# Patient Record
Sex: FEMALE | Race: WHITE | NOT HISPANIC OR LATINO
[De-identification: names, ages, dates, MRNs, and addresses within clinical notes are randomized per-mention and may not be internally consistent; named-entity substitution may affect disease eponyms.]

---

## 2017-01-04 ENCOUNTER — APPOINTMENT (OUTPATIENT)
Dept: HEMATOLOGY ONCOLOGY | Facility: CLINIC | Age: 82
End: 2017-01-04
Payer: MEDICARE

## 2017-01-04 VITALS
HEART RATE: 68 BPM | SYSTOLIC BLOOD PRESSURE: 129 MMHG | RESPIRATION RATE: 16 BRPM | OXYGEN SATURATION: 97 % | TEMPERATURE: 97.3 F | BODY MASS INDEX: 31.55 KG/M2 | WEIGHT: 189.6 LBS | DIASTOLIC BLOOD PRESSURE: 82 MMHG

## 2017-01-04 PROCEDURE — 99214 OFFICE O/P EST MOD 30 MIN: CPT

## 2017-12-22 ENCOUNTER — OUTPATIENT (OUTPATIENT)
Dept: OUTPATIENT SERVICES | Facility: HOSPITAL | Age: 82
LOS: 1 days | Discharge: HOME | End: 2017-12-22

## 2017-12-22 DIAGNOSIS — C50.919 MALIGNANT NEOPLASM OF UNSPECIFIED SITE OF UNSPECIFIED FEMALE BREAST: ICD-10-CM

## 2017-12-29 ENCOUNTER — OTHER (OUTPATIENT)
Age: 82
End: 2017-12-29

## 2018-01-02 ENCOUNTER — APPOINTMENT (OUTPATIENT)
Dept: HEMATOLOGY ONCOLOGY | Facility: CLINIC | Age: 83
End: 2018-01-02
Payer: MEDICARE

## 2018-01-02 VITALS
HEART RATE: 83 BPM | DIASTOLIC BLOOD PRESSURE: 72 MMHG | WEIGHT: 174.58 LBS | BODY MASS INDEX: 29.06 KG/M2 | SYSTOLIC BLOOD PRESSURE: 109 MMHG | TEMPERATURE: 97.4 F | RESPIRATION RATE: 16 BRPM | OXYGEN SATURATION: 96 %

## 2018-01-02 PROCEDURE — 99214 OFFICE O/P EST MOD 30 MIN: CPT

## 2019-01-12 ENCOUNTER — OUTPATIENT (OUTPATIENT)
Dept: OUTPATIENT SERVICES | Facility: HOSPITAL | Age: 84
LOS: 1 days | Discharge: HOME | End: 2019-01-12
Payer: MEDICARE

## 2019-01-12 DIAGNOSIS — C50.919 MALIGNANT NEOPLASM OF UNSPECIFIED SITE OF UNSPECIFIED FEMALE BREAST: ICD-10-CM

## 2019-01-15 ENCOUNTER — APPOINTMENT (OUTPATIENT)
Dept: HEMATOLOGY ONCOLOGY | Facility: CLINIC | Age: 84
End: 2019-01-15
Payer: MEDICARE

## 2019-01-15 VITALS
DIASTOLIC BLOOD PRESSURE: 70 MMHG | TEMPERATURE: 98.2 F | SYSTOLIC BLOOD PRESSURE: 130 MMHG | HEART RATE: 88 BPM | BODY MASS INDEX: 31.18 KG/M2 | RESPIRATION RATE: 16 BRPM | WEIGHT: 187.39 LBS | OXYGEN SATURATION: 98 %

## 2019-01-15 PROCEDURE — 99214 OFFICE O/P EST MOD 30 MIN: CPT

## 2019-01-15 PROCEDURE — 93010 ELECTROCARDIOGRAM REPORT: CPT

## 2019-01-16 NOTE — PHYSICAL EXAM
[de-identified] : rapid heart rate with afib  [Restricted in physically strenuous activity but ambulatory and able to carry out work of a light or sedentary nature] : Status 1- Restricted in physically strenuous activity but ambulatory and able to carry out work of a light or sedentary nature, e.g., light house work, office work [Normal] : affect appropriate [de-identified] : right breast indusration at the site of the breast surgery as before

## 2019-01-16 NOTE — ASSESSMENT
[FreeTextEntry1] : The pt will continue to be followed for evidence of recurrence of her disease. She will continue mammograms and sonograms, bone density and Medical followup. Pt to RTO in 1 year or sooner as necessary.  [Curative] : Goals of care discussed with patient: Curative [Palliative Care Plan] : not applicable at this time

## 2019-01-16 NOTE — REVIEW OF SYSTEMS
[FreeTextEntry7] : has GERD on no meds [Shortness Of Breath] : shortness of breath [Joint Pain] : joint pain [Joint Stiffness] : joint stiffness [Negative] : Allergic/Immunologic

## 2019-01-16 NOTE — HISTORY OF PRESENT ILLNESS
[Disease: _____________________] : Disease: [unfilled] [T: ___] : T[unfilled] [N: ___] : N[unfilled] [M: ___] : M[unfilled] [AJCC Stage: ____] : AJCC Stage: [unfilled] [de-identified] : This is a 79-year-old woman with breast cancer. Her history dates back to May 2008 when a mammogram revealed a right breast lesion measuring 1.1 cm. 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 In October 2008 the patient underwent lumpectomy. The tumor was 1.5 cm infiltrating duct carcinoma grade 1 with 3 negative sentinel lymph nodes. The Oncotype score was 25. The patient underwent MammoSite radiation treatment and has completed 5 years of anastrozole.\par \par  [de-identified] : IDC [de-identified] : Since the last visit the pt remains well and has no symptoms indicating recurrent disease.

## 2019-01-16 NOTE — PHYSICAL EXAM
[de-identified] : rapid heart rate with afib  [Restricted in physically strenuous activity but ambulatory and able to carry out work of a light or sedentary nature] : Status 1- Restricted in physically strenuous activity but ambulatory and able to carry out work of a light or sedentary nature, e.g., light house work, office work [Normal] : affect appropriate [de-identified] : right breast indusration at the site of the breast surgery as before

## 2019-01-16 NOTE — HISTORY OF PRESENT ILLNESS
[Disease: _____________________] : Disease: [unfilled] [T: ___] : T[unfilled] [N: ___] : N[unfilled] [M: ___] : M[unfilled] [AJCC Stage: ____] : AJCC Stage: [unfilled] [de-identified] : This is a 79-year-old woman with breast cancer. Her history dates back to May 2008 when a mammogram revealed a right breast lesion measuring 1.1 cm. 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 In October 2008 the patient underwent lumpectomy. The tumor was 1.5 cm infiltrating duct carcinoma grade 1 with 3 negative sentinel lymph nodes. The Oncotype score was 25. The patient underwent MammoSite radiation treatment and has completed 5 years of anastrozole.\par \par  [de-identified] : IDC [de-identified] : Since the last visit the pt remains well and has no symptoms indicating recurrent disease.

## 2019-01-23 DIAGNOSIS — R00.2 PALPITATIONS: ICD-10-CM

## 2019-02-23 ENCOUNTER — INPATIENT (INPATIENT)
Facility: HOSPITAL | Age: 84
LOS: 15 days | Discharge: NURSING, REHAB OR LONG-TERM CARE | End: 2019-03-11
Attending: INTERNAL MEDICINE | Admitting: INTERNAL MEDICINE
Payer: MEDICARE

## 2019-02-23 ENCOUNTER — TRANSCRIPTION ENCOUNTER (OUTPATIENT)
Age: 84
End: 2019-02-23

## 2019-02-23 VITALS
SYSTOLIC BLOOD PRESSURE: 100 MMHG | OXYGEN SATURATION: 100 % | RESPIRATION RATE: 16 BRPM | DIASTOLIC BLOOD PRESSURE: 52 MMHG | TEMPERATURE: 98 F | HEART RATE: 86 BPM | WEIGHT: 190.04 LBS | HEIGHT: 60 IN

## 2019-02-23 LAB
ALBUMIN SERPL ELPH-MCNC: 3.1 G/DL — LOW (ref 3.3–5)
ALP SERPL-CCNC: 92 U/L — SIGNIFICANT CHANGE UP (ref 40–120)
ALT FLD-CCNC: 13 U/L — SIGNIFICANT CHANGE UP (ref 4–33)
ANION GAP SERPL CALC-SCNC: 12 MMO/L — SIGNIFICANT CHANGE UP (ref 7–14)
APTT BLD: 36.7 SEC — HIGH (ref 27.5–36.3)
AST SERPL-CCNC: 20 U/L — SIGNIFICANT CHANGE UP (ref 4–32)
BILIRUB SERPL-MCNC: 0.6 MG/DL — SIGNIFICANT CHANGE UP (ref 0.2–1.2)
BLD GP AB SCN SERPL QL: NEGATIVE — SIGNIFICANT CHANGE UP
BUN SERPL-MCNC: 25 MG/DL — HIGH (ref 7–23)
CALCIUM SERPL-MCNC: 8.6 MG/DL — SIGNIFICANT CHANGE UP (ref 8.4–10.5)
CHLORIDE SERPL-SCNC: 95 MMOL/L — LOW (ref 98–107)
CO2 SERPL-SCNC: 25 MMOL/L — SIGNIFICANT CHANGE UP (ref 22–31)
CREAT SERPL-MCNC: 0.88 MG/DL — SIGNIFICANT CHANGE UP (ref 0.5–1.3)
FOLATE SERPL-MCNC: > 20 NG/ML — HIGH (ref 4.7–20)
GLUCOSE SERPL-MCNC: 119 MG/DL — HIGH (ref 70–99)
HCT VFR BLD CALC: 24 % — LOW (ref 34.5–45)
HGB BLD-MCNC: 7.3 G/DL — LOW (ref 11.5–15.5)
INR BLD: 1.5 — HIGH (ref 0.88–1.17)
MCHC RBC-ENTMCNC: 30.4 % — LOW (ref 32–36)
MCHC RBC-ENTMCNC: 31.1 PG — SIGNIFICANT CHANGE UP (ref 27–34)
MCV RBC AUTO: 102.1 FL — HIGH (ref 80–100)
NRBC # FLD: 0 K/UL — LOW (ref 25–125)
PLATELET # BLD AUTO: 414 K/UL — HIGH (ref 150–400)
PMV BLD: 9.3 FL — SIGNIFICANT CHANGE UP (ref 7–13)
POTASSIUM SERPL-MCNC: 3.4 MMOL/L — LOW (ref 3.5–5.3)
POTASSIUM SERPL-SCNC: 3.4 MMOL/L — LOW (ref 3.5–5.3)
PROT SERPL-MCNC: 6.5 G/DL — SIGNIFICANT CHANGE UP (ref 6–8.3)
PROTHROM AB SERPL-ACNC: 16.9 SEC — HIGH (ref 9.8–13.1)
RBC # BLD: 2.35 M/UL — LOW (ref 3.8–5.2)
RBC # FLD: 18 % — HIGH (ref 10.3–14.5)
RH IG SCN BLD-IMP: POSITIVE — SIGNIFICANT CHANGE UP
SODIUM SERPL-SCNC: 132 MMOL/L — LOW (ref 135–145)
VIT B12 SERPL-MCNC: 1896 PG/ML — HIGH (ref 200–900)
WBC # BLD: 5.89 K/UL — SIGNIFICANT CHANGE UP (ref 3.8–10.5)
WBC # FLD AUTO: 5.89 K/UL — SIGNIFICANT CHANGE UP (ref 3.8–10.5)

## 2019-02-23 NOTE — ED ADULT NURSE NOTE - CHIEF COMPLAINT QUOTE
Pt arrives to ED via EMS for reported low H&H.  Pt is at a rehab facility [facility].  Pt had a fall on February 9th and injured her back and left shin.  Pt reports having an open wound on her left shin which is wrapped as well as a wound below her left shoulder.  Pt has tubing for a wound vac in place on leg but the vac remained at the rehab facility.  Pt on Eliquis.  Cardiologist is Paul Sanderson.  Pt has a left arm PICC line in place.

## 2019-02-23 NOTE — ED ADULT TRIAGE NOTE - CHIEF COMPLAINT QUOTE
Pt arrives to ED via EMS for reported low H&H.  Pt is at a rehab facility [facility].  Pt had a fall on February 9th and injured her back and left shin.  Pt reports having an open wound on her left shin which is wrapped as well as a wound below her left shoulder.  Pt has tubing for a wound vac in place on leg but the vac remained at the rehab facility.  Pt on Eliquis.  Cardiologist is Paul Sanderson. Pt arrives to ED via EMS for reported low H&H.  Pt is at a rehab facility [facility].  Pt had a fall on February 9th and injured her back and left shin.  Pt reports having an open wound on her left shin which is wrapped as well as a wound below her left shoulder.  Pt has tubing for a wound vac in place on leg but the vac remained at the rehab facility.  Pt on Eliquis.  Cardiologist is Paul Sanderson.  Pt has a left arm PICC line in place.

## 2019-02-23 NOTE — ED PROVIDER NOTE - PROGRESS NOTE DETAILS
Sahil:  CT negative for abscess.  Admitted to medicine under Dr. Timoteo Sanderson.  MAR text paged.

## 2019-02-23 NOTE — ED PROVIDER NOTE - OBJECTIVE STATEMENT
83 y.o. female PMH DM, Afib on Eliquis p/w hemoglobin of 7.1 drawn today at her rehab facility.  She fell from standing on a chair in her kitchen two weeks ago and was impaled on a kitchen appliance for 48 hours before her daughter found her.  She had lost some blood and had a hemoglobin of 6 (baseline around 9) at that admission, and also had a wound on her left lower lateral leg that had a wound vac previously, which is now removed.  She was discharged to a rehab facility.  Yesterday she developed some low grade fevers to 99F, and she had a PICC line place and today she was started on Zosyn early this morning her doctor at the rehab.  Today she is feeling well, no chest pain, dizziness, SOB, CP, fevers, leg pain, weakness.  She denies bleeding from wound, hematochezia, hematuria, melena, or any other bleeding.  She is concerned that no infectious disease doctor has seen her leg.

## 2019-02-23 NOTE — ED PROVIDER NOTE - CLINICAL SUMMARY MEDICAL DECISION MAKING FREE TEXT BOX
Repeat CBC, transfuse to 7.0 hgb as patient is asymptomatic.  CT leg to eval for abscess.  Likely D/C to continue IV Abx at rehab.

## 2019-02-23 NOTE — ED ADULT NURSE NOTE - OBJECTIVE STATEMENT
received pt to room 23 aox3 in no apparent distress c/o wound on LLE. PT fell 2 weeks ago and impaled her leg on stool in kitchen, pt was not found by daughter until two days later. pT was brought to hospital had loss blood and had two open wounds that required wound vac upon discharge, wound to left calf and wound to left lower side of back. Pt states once in rehab facility wound on leg was not getting the same as it was in hospital, wound changes were done different and wound vac not being used. Pt states when she left the hospital wound on leg was linear with small opening, wound now is large measuring 5 inches  opened with some serosanguinous drainage. Pt yesterday began having increased pain and swelling to wound on leg and low grade fevers, lower hemoglobin had PICC rosaura placed and started on IV abx. Wound on back is intact with wound vac dressing attached and tubing pt states wound on back improving. Ecchymosis noted to arms and buttock, pt denies any other wounds. Lower extremity edema noted bilateral pt states chronic, LLE red with warmth around site of wound. Pt states able to ambulate small distance with some pain. Pt states swelling is better today than yesterday . No motor or deficits noted. Pt denies SOB, chest pain, chills, tingling, numbness, n/v, urinary changes weakness or any other symptoms. PICC line noted to left arm with positive blood return  Breaths equal and unlabored VSS labs sent as per order will continue to monitor

## 2019-02-23 NOTE — ED ADULT NURSE NOTE - NSIMPLEMENTINTERV_GEN_ALL_ED
Implemented All Fall with Harm Risk Interventions:  [place] to call system. Call bell, personal items and telephone within reach. Instruct patient to call for assistance. Room bathroom lighting operational. Non-slip footwear when patient is off stretcher. Physically safe environment: no spills, clutter or unnecessary equipment. Stretcher in lowest position, wheels locked, appropriate side rails in place. Provide visual cue, wrist band, yellow gown, etc. Monitor gait and stability. Monitor for mental status changes and reorient to person, place, and time. Review medications for side effects contributing to fall risk. Reinforce activity limits and safety measures with patient and family. Provide visual clues: red socks.

## 2019-02-23 NOTE — ED PROVIDER NOTE - PHYSICAL EXAMINATION
Gen: Obese, NAD, alert and oriented  HEENT:  Sclera clear, MMM  Heart:  RRR, Grade III crescendo-decrescendo systolic murmur at upper sternal border.  Lung:  CTA b/l, no rales or wheeze  Abd:  ND, soft, NT  Ext:  20cm x 10 cm well-healing wound on lateral left lower leg with mild erythema, no exudate, nonbleeding.  Deep portion in center with gauze packing - gauze is dry.  Skin:  No abrasions or ecchymosis except as above  Neuro:  Nonfocal Gen: Obese, NAD, alert and oriented  HEENT:  Sclera clear, MMM  Heart:  RRR, Grade III crescendo-decrescendo systolic murmur at upper sternal border.  Lung:  CTA b/l, no rales or wheeze  Abd:  ND, soft, NT  Ext:  20cm x 10 cm well-healing wound on lateral left lower leg with mild erythema, no exudate, nonbleeding.  Deep portion in center with gauze packing - gauze is dry.  Skin:  as documented above  Neuro:  Nonfocal

## 2019-02-23 NOTE — ED PROVIDER NOTE - SHIFT CHANGE DETAILS
I have signed over this patient to the above attending physician. Pertinent history, physical exam findings and workup thus far in the ED have been discussed. The pending tests and plan, including CT LE and admission were signed over.  All questions from the above attending physician have been answered.

## 2019-02-23 NOTE — ED PROVIDER NOTE - ATTENDING CONTRIBUTION TO CARE
Lefty: 82yo female with a h/o DM and a fib on eliquis BIBEMS from rehab for low h/h. Pt fell two weeks ago sustaining lacerations on left leg and flank. Pt required local wound care and vacs at both sites  while admitted. 4 days ago she was d/c to rehab facility and has since developed fevers and the wound has dehisced. No increased pain or swelling. Pt denies bleeding from the sites. No chest pain, SOB, or dizziness. NO bloody or black stools. No abdominal pain, nausea or vomiting. Exam: GENERAL: well appearing, NAD, HEENT: MMM, pink conjunctiva, CARDIO: +S1/S2, no murmurs, rubs or gallops, LUNGS: CTA B/L, no wheezing, rales or rhonchi, ABD: soft, nontender, BSx4 quadrants, no guarding or rigidity. EXT: 20 x 10cm wound on LLE, sutures intact but surrounding area appears necrotic with surrounding erythema. central packing in place. No drainage. left flank wound with wound vac attached, clean and dry and no signs of surrounding infection. NEURO: AxOx3, SKIN: as documented above on leg findings. A/P- 82 yo female with left lower extremity wound with concern for infection. will obtain labs, CT leg, and reassess. PT currently on abx from rehab. Lefty: 84yo female with a h/o DM and a fib on eliquis BIBEMS from rehab for low h/h. Pt fell two weeks ago sustaining lacerations on left leg and flank. Pt required local wound care and vacs at both sites  while admitted. 4 days ago she was d/c to rehab facility and has since developed fevers and the wound has dehisced. No increased pain or swelling. Pt denies bleeding from the sites. No chest pain, SOB, or dizziness. NO bloody or black stools. No abdominal pain, nausea or vomiting. Exam: GENERAL: well appearing, NAD, HEENT: MMM, pink conjunctiva, CARDIO: +systolic murmur heard best at sternal border LUNGS: CTA B/L, no wheezing, rales or rhonchi, ABD: soft, nontender, BSx4 quadrants, no guarding or rigidity. EXT: 20 x 10cm wound on LLE, sutures intact but surrounding area appears necrotic with surrounding erythema. central packing in place. No drainage. left flank wound with wound vac attached, clean and dry and no signs of surrounding infection. NEURO: AxOx3, SKIN: as documented above on leg findings. A/P- 84 yo female with left lower extremity wound with concern for infection. will obtain labs, CT leg, and reassess. PT currently on abx from rehab.

## 2019-02-24 DIAGNOSIS — S81.802A UNSPECIFIED OPEN WOUND, LEFT LOWER LEG, INITIAL ENCOUNTER: ICD-10-CM

## 2019-02-24 DIAGNOSIS — D64.9 ANEMIA, UNSPECIFIED: ICD-10-CM

## 2019-02-24 DIAGNOSIS — I48.91 UNSPECIFIED ATRIAL FIBRILLATION: ICD-10-CM

## 2019-02-24 DIAGNOSIS — Z29.9 ENCOUNTER FOR PROPHYLACTIC MEASURES, UNSPECIFIED: ICD-10-CM

## 2019-02-24 DIAGNOSIS — E11.9 TYPE 2 DIABETES MELLITUS WITHOUT COMPLICATIONS: ICD-10-CM

## 2019-02-24 LAB
BLD GP AB SCN SERPL QL: NEGATIVE — SIGNIFICANT CHANGE UP
GLUCOSE BLDC GLUCOMTR-MCNC: 125 MG/DL — HIGH (ref 70–99)
RH IG SCN BLD-IMP: POSITIVE — SIGNIFICANT CHANGE UP

## 2019-02-24 PROCEDURE — 99222 1ST HOSP IP/OBS MODERATE 55: CPT | Mod: GC,57

## 2019-02-24 PROCEDURE — 99223 1ST HOSP IP/OBS HIGH 75: CPT

## 2019-02-24 PROCEDURE — 73701 CT LOWER EXTREMITY W/DYE: CPT | Mod: 26,LT

## 2019-02-24 PROCEDURE — 97597 DBRDMT OPN WND 1ST 20 CM/<: CPT | Mod: GC

## 2019-02-24 RX ORDER — ENOXAPARIN SODIUM 100 MG/ML
40 INJECTION SUBCUTANEOUS DAILY
Qty: 0 | Refills: 0 | Status: DISCONTINUED | OUTPATIENT
Start: 2019-02-24 | End: 2019-03-01

## 2019-02-24 RX ORDER — SODIUM CHLORIDE 9 MG/ML
1000 INJECTION, SOLUTION INTRAVENOUS
Qty: 0 | Refills: 0 | Status: DISCONTINUED | OUTPATIENT
Start: 2019-02-24 | End: 2019-02-24

## 2019-02-24 RX ORDER — POTASSIUM CHLORIDE 20 MEQ
40 PACKET (EA) ORAL ONCE
Qty: 0 | Refills: 0 | Status: COMPLETED | OUTPATIENT
Start: 2019-02-24 | End: 2019-02-27

## 2019-02-24 RX ORDER — DEXTROSE 50 % IN WATER 50 %
12.5 SYRINGE (ML) INTRAVENOUS ONCE
Qty: 0 | Refills: 0 | Status: DISCONTINUED | OUTPATIENT
Start: 2019-02-24 | End: 2019-03-11

## 2019-02-24 RX ORDER — PIPERACILLIN AND TAZOBACTAM 4; .5 G/20ML; G/20ML
3.38 INJECTION, POWDER, LYOPHILIZED, FOR SOLUTION INTRAVENOUS ONCE
Qty: 0 | Refills: 0 | Status: COMPLETED | OUTPATIENT
Start: 2019-02-24 | End: 2019-02-24

## 2019-02-24 RX ORDER — HYDROMORPHONE HYDROCHLORIDE 2 MG/ML
0.5 INJECTION INTRAMUSCULAR; INTRAVENOUS; SUBCUTANEOUS
Qty: 0 | Refills: 0 | Status: DISCONTINUED | OUTPATIENT
Start: 2019-02-24 | End: 2019-02-24

## 2019-02-24 RX ORDER — OXYCODONE AND ACETAMINOPHEN 5; 325 MG/1; MG/1
1 TABLET ORAL ONCE
Qty: 0 | Refills: 0 | Status: DISCONTINUED | OUTPATIENT
Start: 2019-02-24 | End: 2019-02-24

## 2019-02-24 RX ORDER — SIMVASTATIN 20 MG/1
40 TABLET, FILM COATED ORAL AT BEDTIME
Qty: 0 | Refills: 0 | Status: DISCONTINUED | OUTPATIENT
Start: 2019-02-24 | End: 2019-03-11

## 2019-02-24 RX ORDER — FENTANYL CITRATE 50 UG/ML
25 INJECTION INTRAVENOUS
Qty: 0 | Refills: 0 | Status: DISCONTINUED | OUTPATIENT
Start: 2019-02-24 | End: 2019-02-24

## 2019-02-24 RX ORDER — INSULIN LISPRO 100/ML
VIAL (ML) SUBCUTANEOUS
Qty: 0 | Refills: 0 | Status: DISCONTINUED | OUTPATIENT
Start: 2019-02-24 | End: 2019-03-11

## 2019-02-24 RX ORDER — APIXABAN 2.5 MG/1
2.5 TABLET, FILM COATED ORAL EVERY 12 HOURS
Qty: 0 | Refills: 0 | Status: DISCONTINUED | OUTPATIENT
Start: 2019-02-24 | End: 2019-02-24

## 2019-02-24 RX ORDER — SODIUM CHLORIDE 9 MG/ML
500 INJECTION, SOLUTION INTRAVENOUS ONCE
Qty: 0 | Refills: 0 | Status: COMPLETED | OUTPATIENT
Start: 2019-02-24 | End: 2019-02-24

## 2019-02-24 RX ORDER — VANCOMYCIN HCL 1 G
VIAL (EA) INTRAVENOUS
Qty: 0 | Refills: 0 | Status: DISCONTINUED | OUTPATIENT
Start: 2019-02-24 | End: 2019-02-24

## 2019-02-24 RX ORDER — VANCOMYCIN HCL 1 G
750 VIAL (EA) INTRAVENOUS EVERY 12 HOURS
Qty: 0 | Refills: 0 | Status: DISCONTINUED | OUTPATIENT
Start: 2019-02-24 | End: 2019-03-08

## 2019-02-24 RX ORDER — GLUCAGON INJECTION, SOLUTION 0.5 MG/.1ML
1 INJECTION, SOLUTION SUBCUTANEOUS ONCE
Qty: 0 | Refills: 0 | Status: DISCONTINUED | OUTPATIENT
Start: 2019-02-24 | End: 2019-03-11

## 2019-02-24 RX ORDER — PIPERACILLIN AND TAZOBACTAM 4; .5 G/20ML; G/20ML
3.38 INJECTION, POWDER, LYOPHILIZED, FOR SOLUTION INTRAVENOUS EVERY 8 HOURS
Qty: 0 | Refills: 0 | Status: DISCONTINUED | OUTPATIENT
Start: 2019-02-24 | End: 2019-03-08

## 2019-02-24 RX ORDER — SODIUM CHLORIDE 9 MG/ML
1000 INJECTION, SOLUTION INTRAVENOUS
Qty: 0 | Refills: 0 | Status: DISCONTINUED | OUTPATIENT
Start: 2019-02-24 | End: 2019-02-27

## 2019-02-24 RX ORDER — TOPIRAMATE 25 MG
100 TABLET ORAL
Qty: 0 | Refills: 0 | Status: DISCONTINUED | OUTPATIENT
Start: 2019-02-24 | End: 2019-03-11

## 2019-02-24 RX ORDER — ACETAMINOPHEN 500 MG
1000 TABLET ORAL ONCE
Qty: 0 | Refills: 0 | Status: COMPLETED | OUTPATIENT
Start: 2019-02-24 | End: 2019-02-24

## 2019-02-24 RX ORDER — ACETAMINOPHEN 500 MG
650 TABLET ORAL EVERY 6 HOURS
Qty: 0 | Refills: 0 | Status: DISCONTINUED | OUTPATIENT
Start: 2019-02-24 | End: 2019-03-11

## 2019-02-24 RX ORDER — DEXTROSE 50 % IN WATER 50 %
15 SYRINGE (ML) INTRAVENOUS ONCE
Qty: 0 | Refills: 0 | Status: DISCONTINUED | OUTPATIENT
Start: 2019-02-24 | End: 2019-03-11

## 2019-02-24 RX ORDER — OXYCODONE HYDROCHLORIDE 5 MG/1
5 TABLET ORAL EVERY 6 HOURS
Qty: 0 | Refills: 0 | Status: DISCONTINUED | OUTPATIENT
Start: 2019-02-24 | End: 2019-03-03

## 2019-02-24 RX ORDER — DEXTROSE 50 % IN WATER 50 %
25 SYRINGE (ML) INTRAVENOUS ONCE
Qty: 0 | Refills: 0 | Status: DISCONTINUED | OUTPATIENT
Start: 2019-02-24 | End: 2019-03-11

## 2019-02-24 RX ORDER — ONDANSETRON 8 MG/1
4 TABLET, FILM COATED ORAL ONCE
Qty: 0 | Refills: 0 | Status: DISCONTINUED | OUTPATIENT
Start: 2019-02-24 | End: 2019-02-24

## 2019-02-24 RX ORDER — METFORMIN HYDROCHLORIDE 850 MG/1
1000 TABLET ORAL
Qty: 0 | Refills: 0 | Status: DISCONTINUED | OUTPATIENT
Start: 2019-02-24 | End: 2019-02-24

## 2019-02-24 RX ORDER — PYRIDOXINE HCL (VITAMIN B6) 100 MG
100 TABLET ORAL DAILY
Qty: 0 | Refills: 0 | Status: DISCONTINUED | OUTPATIENT
Start: 2019-02-24 | End: 2019-03-11

## 2019-02-24 RX ADMIN — Medication 1000 MILLIGRAM(S): at 08:59

## 2019-02-24 RX ADMIN — SODIUM CHLORIDE 100 MILLILITER(S): 9 INJECTION, SOLUTION INTRAVENOUS at 18:15

## 2019-02-24 RX ADMIN — PIPERACILLIN AND TAZOBACTAM 25 GRAM(S): 4; .5 INJECTION, POWDER, LYOPHILIZED, FOR SOLUTION INTRAVENOUS at 23:52

## 2019-02-24 RX ADMIN — OXYCODONE AND ACETAMINOPHEN 1 TABLET(S): 5; 325 TABLET ORAL at 00:00

## 2019-02-24 RX ADMIN — PIPERACILLIN AND TAZOBACTAM 25 GRAM(S): 4; .5 INJECTION, POWDER, LYOPHILIZED, FOR SOLUTION INTRAVENOUS at 02:16

## 2019-02-24 RX ADMIN — HYDROMORPHONE HYDROCHLORIDE 0.5 MILLIGRAM(S): 2 INJECTION INTRAMUSCULAR; INTRAVENOUS; SUBCUTANEOUS at 19:00

## 2019-02-24 RX ADMIN — HYDROMORPHONE HYDROCHLORIDE 0.5 MILLIGRAM(S): 2 INJECTION INTRAMUSCULAR; INTRAVENOUS; SUBCUTANEOUS at 18:30

## 2019-02-24 RX ADMIN — SODIUM CHLORIDE 100 MILLILITER(S): 9 INJECTION, SOLUTION INTRAVENOUS at 23:52

## 2019-02-24 RX ADMIN — Medication 400 MILLIGRAM(S): at 08:35

## 2019-02-24 RX ADMIN — HYDROMORPHONE HYDROCHLORIDE 0.5 MILLIGRAM(S): 2 INJECTION INTRAMUSCULAR; INTRAVENOUS; SUBCUTANEOUS at 18:40

## 2019-02-24 RX ADMIN — HYDROMORPHONE HYDROCHLORIDE 0.5 MILLIGRAM(S): 2 INJECTION INTRAMUSCULAR; INTRAVENOUS; SUBCUTANEOUS at 18:10

## 2019-02-24 RX ADMIN — OXYCODONE AND ACETAMINOPHEN 1 TABLET(S): 5; 325 TABLET ORAL at 02:17

## 2019-02-24 RX ADMIN — HYDROMORPHONE HYDROCHLORIDE 0.5 MILLIGRAM(S): 2 INJECTION INTRAMUSCULAR; INTRAVENOUS; SUBCUTANEOUS at 21:45

## 2019-02-24 RX ADMIN — SODIUM CHLORIDE 500 MILLILITER(S): 9 INJECTION, SOLUTION INTRAVENOUS at 20:56

## 2019-02-24 NOTE — CONSULT NOTE ADULT - SUBJECTIVE AND OBJECTIVE BOX
General Surgery Consult  Consulting surgical team: [facility]  Consulting attending: Dr. Perry    HPI:  83 y.o. female PMH DM, Afib on Eliquis, s/p fall from a stool in her kitchen two weeks ago on Feb 9 and was impaled on her back on a kitchen appliance for 48 hours before her daughter found her, hospitalized at [facility] for acute blood loss anemia due to hematoma and had a hemoglobin of 6 (baseline around 9) requiring 3 units of pRBC, seen by surgeon Dr. Dorian Lazaro and had wound VAc placed to her left back  and LLE (wound VAC LLE removed prior to discharge to rehab 4 days ago, who now presents with wound dehiscence LLE and low grade fever 99F at rehab and was started on zosyn via LUE PICC placed a day ago. Patient denies chest pain/dizziness, sob, cough, abd pain, diarrhea.  In ED, CT LLE severe diffuse subcutaneous edema in the extremities, however, no collection or abscess and no advance OM, no necrotizing infection.       PAST MEDICAL HISTORY:  Diabetes  Atrial fibrillation, unspecified type      PAST SURGICAL HISTORY:  No significant past surgical history      MEDICATIONS:  acetaminophen   Tablet .. 650 milliGRAM(s) Oral every 6 hours PRN  dextrose 40% Gel 15 Gram(s) Oral once PRN  dextrose 5%. 1000 milliLiter(s) IV Continuous <Continuous>  dextrose 50% Injectable 12.5 Gram(s) IV Push once  dextrose 50% Injectable 25 Gram(s) IV Push once  dextrose 50% Injectable 25 Gram(s) IV Push once  glucagon  Injectable 1 milliGRAM(s) IntraMuscular once PRN  insulin lispro (HumaLOG) corrective regimen sliding scale   SubCutaneous three times a day before meals  oxyCODONE    IR 5 milliGRAM(s) Oral every 6 hours PRN  piperacillin/tazobactam IVPB. 3.375 Gram(s) IV Intermittent every 8 hours  potassium chloride    Tablet ER 40 milliEquivalent(s) Oral once  pyridoxine 100 milliGRAM(s) Oral daily  simvastatin 40 milliGRAM(s) Oral at bedtime  topiramate 100 milliGRAM(s) Oral two times a day  vancomycin  IVPB 750 milliGRAM(s) IV Intermittent every 12 hours      ALLERGIES:  No Known Allergies      VITALS & I/Os:  Vital Signs Last 24 Hrs  T(C): 37.1 (24 Feb 2019 08:58), Max: 37.6 (23 Feb 2019 22:14)  T(F): 98.7 (24 Feb 2019 08:58), Max: 99.7 (23 Feb 2019 22:14)  HR: 90 (24 Feb 2019 08:58) (78 - 95)  BP: 101/48 (24 Feb 2019 08:58) (85/53 - 119/68)  BP(mean): --  RR: 16 (24 Feb 2019 08:58) (16 - 18)  SpO2: 100% (24 Feb 2019 08:58) (100% - 100%)    I&O's Summary      PHYSICAL EXAM:  General: No acute distress  Respiratory: Nonlabored  Cardiovascular: RRR  Abdominal: Soft, nondistended, nontender.   Extremities: Warm, Left lower leg with cellulitis, open wound with areas of necrosis.     LABS:                        7.3    5.89  )-----------( 414      ( 23 Feb 2019 22:00 )             24.0     02-23    132<L>  |  95<L>  |  25<H>  ----------------------------<  119<H>  3.4<L>   |  25  |  0.88    Ca    8.6      23 Feb 2019 22:00    TPro  6.5  /  Alb  3.1<L>  /  TBili  0.6  /  DBili  x   /  AST  20  /  ALT  13  /  AlkPhos  92  02-23    Lactate:    PT/INR - ( 23 Feb 2019 22:00 )   PT: 16.9 SEC;   INR: 1.50          PTT - ( 23 Feb 2019 22:00 )  PTT:36.7 SEC              IMAGING:

## 2019-02-24 NOTE — H&P ADULT - NSHPLABSRESULTS_GEN_ALL_CORE
LABS:                        7.3    5.89  )-----------( 414      ( 23 Feb 2019 22:00 )             24.0     02-23    132<L>  |  95<L>  |  25<H>  ----------------------------<  119<H>  3.4<L>   |  25  |  0.88    Ca    8.6      23 Feb 2019 22:00    TPro  6.5  /  Alb  3.1<L>  /  TBili  0.6  /  DBili  x   /  AST  20  /  ALT  13  /  AlkPhos  92  02-23      PT/INR - ( 23 Feb 2019 22:00 )   PT: 16.9 SEC;   INR: 1.50          PTT - ( 23 Feb 2019 22:00 )  PTT:36.7 SEC      EKG: Afib, incomplete RBBB, q's V1-V2    RADIOLOGY & ADDITIONAL TESTS: LABS:                        7.3    5.89  )-----------( 414      ( 23 Feb 2019 22:00 )             24.0     02-23    132<L>  |  95<L>  |  25<H>  ----------------------------<  119<H>  3.4<L>   |  25  |  0.88    Ca    8.6      23 Feb 2019 22:00    TPro  6.5  /  Alb  3.1<L>  /  TBili  0.6  /  DBili  x   /  AST  20  /  ALT  13  /  AlkPhos  92  02-23      PT/INR - ( 23 Feb 2019 22:00 )   PT: 16.9 SEC;   INR: 1.50          PTT - ( 23 Feb 2019 22:00 )  PTT:36.7 SEC      EKG: Afib, incomplete RBBB, q's V1-V2    RADIOLOGY & ADDITIONAL TESTS:  < from: CT Lower Extremity w/ IV Cont, Left (02.24.19 @ 01:43) >    FINDINGS:   There is an open skin defect in the anterolateral aspect of the distal   left leg.    There is severe diffuse subcutaneous edema in the in the distal left   thigh, left leg left foot and visualized portions of the medial right   thigh and leg.    There is no discrete abscess or drainable fluid collection.    There is no evidence of necrotizing infection.    There is no gross CT evidence of fasciitis or myositis in the left leg.  There is no periosteal reaction or destructive bony process and the left   tibia or fibula.  There is no suspicious lytic or blastic lesion.    Incidental findings:  There is advanced osteoarthrosis and a suprapatellar joint effusion in   the left knee.    There is heavy atherosclerotic calcification of the arterial vasculature   in the left popliteal region and left leg.    IMPRESSION:   Severe diffuse subcutaneous edema in the visualized portions of both   lower extremities.  Correlate clinically for anasarca versus cellulitis.  No discrete abscess or drainable fluid collection.    No evidence of necrotizing infection.    No CT evidence of advanced osteomyelitis.    Severe peripheral vascular disease.  Claudication should be excluded   clinically.

## 2019-02-24 NOTE — H&P ADULT - PROBLEM SELECTOR PLAN 3
-Likely anemia of chronic disease and acute blood loss anemia, baseline Hgb ~9, down to 6 after fall and hematoma, transfused 3 units pRBC at [facility]  -no active bleed, monitor CBC, transfuse prn to keep Hgb>7  -b12 1896, folate >20, check iron panel

## 2019-02-24 NOTE — H&P ADULT - NSHPREVIEWOFSYSTEMS_GEN_ALL_CORE
CONSTITUTIONAL: low grade fever, no weight loss, or fatigue  EYES: No eye pain, visual disturbances, or discharge  ENMT:  No difficulty hearing, tinnitus, vertigo; No sinus or throat pain  NECK: No pain or stiffness  BREASTS: No pain, masses, or nipple discharge  RESPIRATORY: No cough, wheezing, chills or hemoptysis; No shortness of breath  CARDIOVASCULAR: No chest pain, palpitations, dizziness, or leg swelling  GASTROINTESTINAL: No abdominal or epigastric pain. No nausea, vomiting, or hematemesis; No diarrhea or constipation. No melena or hematochezia.  GENITOURINARY: No dysuria, frequency, hematuria, or incontinence  NEUROLOGICAL: No headaches, memory loss, loss of strength, numbness, or tremors  SKIN: LLE wound dehiscence    LYMPH NODES: No enlarged glands  ENDOCRINE: No heat or cold intolerance; No hair loss  MUSCULOSKELETAL: No muscle or back pain  PSYCHIATRIC: No depression, anxiety, mood swings, or difficulty sleeping  HEME/LYMPH: No easy bruising, or bleeding gums  ALLERGY AND IMMUNOLOGIC: No hives or eczema

## 2019-02-24 NOTE — PACU DISCHARGE NOTE - COMMENTS
Patient with intermittent episodes of HR to 150s.  Evaluated at bedside, patient asymptomatic and BP stable, episodes self resolved.  Patient states she is not on any medications to lower HR due to concern for lowering her blood pressure.  Continuously monitored in the PACU, stable HR in 90s-110s with stable blood pressures

## 2019-02-24 NOTE — H&P ADULT - HISTORY OF PRESENT ILLNESS
83 y.o. female PMH DM, Afib on Eliquis, s/p fall from a stool in her kitchen two weeks ago on Feb 9 and was impaled on her back on a kitchen appliance for 48 hours before her daughter found her, hospitalized at [facility] for acute blood loss anemia due to hematoma and had a hemoglobin of 6 (baseline around 9) requiring 3 units of pRBC, seen by surgeon Dr. Dorian Lazaro and had wound VAc placed to her left back  and LLE (wound VAC LLE removed prior to discharge to rehab 4 days ago, who now presents with wound dehiscence LLE and low grade fever 99F at rehab and was started on zosyn via LUE PICC placed a day ago. Patient denies chest pain/dizziness, sob, cough, abd pain, diarrhea.  In ED, CT LLE severe diffuse subcutaneous edema in the extremities, however, no collection or abscess and no advance OM, no necrotizing infection.

## 2019-02-24 NOTE — H&P ADULT - ASSESSMENT
83 y.o. female PMH DM, Afib on Eliquis, s/p fall from a stool in her kitchen two weeks ago on Feb 9 and was impaled on her back on a kitchen appliance for 48 hours before her daughter found her, hospitalized at [facility] for acute blood loss anemia due to hematoma and had a hemoglobin of 6 (baseline around 9) requiring 3 units of pRBC, seen by surgeon Dr. Dorian Lazaro and had wound VAc placed to her left back  and LLE (wound VAC LLE removed prior to discharge to rehab 4 days ago, who now presents with wound dehiscence LLE and low grade fever 99F at rehab and was started on zosyn via LUE PICC placed a day ago, CT no collection/abscess, admitted for cellulitis and wound dehiscence

## 2019-02-24 NOTE — BRIEF OPERATIVE NOTE - OPERATION/FINDINGS
Debridement of left lower leg wound.   Overlying necrotic skin removed. Old clot extravasated. Dead tissue excised.  Vac applied

## 2019-02-24 NOTE — CHART NOTE - NSCHARTNOTEFT_GEN_A_CORE
Post-operative Check    SUBJECTIVE: No acute events in the immediate post-operative period. Pain well controlled.     OBJECTIVE:  T(C): 36.6 (02-24-19 @ 23:00), Max: 37.1 (02-24-19 @ 08:58)  HR: 92 (02-24-19 @ 23:00) (78 - 121)  BP: 120/68 (02-24-19 @ 23:00) (90/45 - 120/68)  RR: 18 (02-24-19 @ 23:00) (14 - 22)  SpO2: 100% (02-24-19 @ 23:00) (96% - 100%)      02-24-19 @ 07:01  -  02-24-19 @ 23:10  --------------------------------------------------------  IN: 1000 mL / OUT: 0 mL / NET: 1000 mL        Physical Exam:   - Constitutional: AOx3, NAD  - CV: normotensive, regular rate   - Respiratory: nonlabored  - Abdomen: soft, nontender, nondistended  - Extremities: WWP, marked lower extremity edema b/l  - Vascular: pulses difficult to palpate 2/2 edema  - Neurological: no focal deficits    ASSESSMENT:   KATHY ENAMORADO is a 83y Female s/p LLE debridement and vac placement, POD0, stable.    PLAN:  - Pain management  - Follow UOP  - regular diet  - monitor vac outputs

## 2019-02-24 NOTE — CHART NOTE - NSCHARTNOTEFT_GEN_A_CORE
83 y.o. female PMH DM, Afib, s/p fall at home from a stool in her kitchen two weeks ago. Notified by  holding RN that patent is in PACU s/t wound debridement no with HR up to 150'w on  monitor.  Patient was seen and examined at bedside, daughter was also present. No acute distress noted,. Dr Sanderson was contacted via cell and recommended tele monitoring for now. Also bolus 500ml arcelia LR was given for soft BP. Will continue to monitor.

## 2019-02-24 NOTE — H&P ADULT - PROBLEM SELECTOR PLAN 2
-reduce eliquis to 2.5 mg bid with anemia and advanced age  -rate controlled  -cardiology Dr. Sanderson is her PCP

## 2019-02-24 NOTE — H&P ADULT - PROBLEM SELECTOR PLAN 1
-LLE open wound with e/o wound dehiscence, wound VAC removed prior to d/c to rehab from [facility] 4 days ago, pt was seen by surgeon Dr. Dorian Lazaro at [facility], still has left back wound VAC (last changed on Friday)  -e/o LLE cellulitis with wound dehiscence  -cover with vanco/zosyn, has PICC line placed a day ago at rehab  -surgery consult for wound care/wound VAC management

## 2019-02-24 NOTE — H&P ADULT - NSHPPHYSICALEXAM_GEN_ALL_CORE
Vital Signs Last 24 Hrs  T(C): 37.1 (24 Feb 2019 08:58), Max: 37.6 (23 Feb 2019 22:14)  T(F): 98.7 (24 Feb 2019 08:58), Max: 99.7 (23 Feb 2019 22:14)  HR: 90 (24 Feb 2019 08:58) (78 - 95)  BP: 101/48 (24 Feb 2019 08:58) (85/53 - 119/68)  BP(mean): --  RR: 16 (24 Feb 2019 08:58) (16 - 18)  SpO2: 100% (24 Feb 2019 08:58) (100% - 100%)  CAPILLARY BLOOD GLUCOSE        I&O's Summary      PHYSICAL EXAM:  GENERAL: NAD, well-developed  HEAD:  Atraumatic, Normocephalic  EYES: EOMI, PERRLA, conjunctiva and sclera clear  NECK: Supple, No JVD  CHEST/LUNG: Clear to auscultation bilaterally; No wheeze  HEART: irregularly irregular, 4/6 systolic ejection murmur;   ABDOMEN: Soft, Nontender, Nondistended; Bowel sounds present  Back: left flank wound VAC intact, no abscess  EXTREMITIES:  + Peripheral Pulses, LUE PICC line, No clubbing, cyanosis, b/l leg edema (L>R), LLE with 20x10 cm open wound with wound dehiscence, wound dressing, with surrounding erythema/swelling, no fluctuance, tender to palpation  PSYCH: AAOx3  NEUROLOGY: non-focal  SKIN: No rashes or lesions

## 2019-02-25 DIAGNOSIS — D62 ACUTE POSTHEMORRHAGIC ANEMIA: ICD-10-CM

## 2019-02-25 DIAGNOSIS — E11.628 TYPE 2 DIABETES MELLITUS WITH OTHER SKIN COMPLICATIONS: ICD-10-CM

## 2019-02-25 LAB
ANION GAP SERPL CALC-SCNC: 12 MMO/L — SIGNIFICANT CHANGE UP (ref 7–14)
BASOPHILS # BLD AUTO: 0.02 K/UL — SIGNIFICANT CHANGE UP (ref 0–0.2)
BASOPHILS NFR BLD AUTO: 0.5 % — SIGNIFICANT CHANGE UP (ref 0–2)
BUN SERPL-MCNC: 14 MG/DL — SIGNIFICANT CHANGE UP (ref 7–23)
CALCIUM SERPL-MCNC: 8.2 MG/DL — LOW (ref 8.4–10.5)
CHLORIDE SERPL-SCNC: 98 MMOL/L — SIGNIFICANT CHANGE UP (ref 98–107)
CO2 SERPL-SCNC: 26 MMOL/L — SIGNIFICANT CHANGE UP (ref 22–31)
CREAT SERPL-MCNC: 0.67 MG/DL — SIGNIFICANT CHANGE UP (ref 0.5–1.3)
EOSINOPHIL # BLD AUTO: 0.07 K/UL — SIGNIFICANT CHANGE UP (ref 0–0.5)
EOSINOPHIL NFR BLD AUTO: 1.8 % — SIGNIFICANT CHANGE UP (ref 0–6)
FERRITIN SERPL-MCNC: 100.6 NG/ML — SIGNIFICANT CHANGE UP (ref 15–150)
GLUCOSE BLDC GLUCOMTR-MCNC: 138 MG/DL — HIGH (ref 70–99)
GLUCOSE BLDC GLUCOMTR-MCNC: 141 MG/DL — HIGH (ref 70–99)
GLUCOSE BLDC GLUCOMTR-MCNC: 149 MG/DL — HIGH (ref 70–99)
GLUCOSE BLDC GLUCOMTR-MCNC: 163 MG/DL — HIGH (ref 70–99)
GLUCOSE SERPL-MCNC: 100 MG/DL — HIGH (ref 70–99)
GRAM STN WND: SIGNIFICANT CHANGE UP
HBA1C BLD-MCNC: 5.2 % — SIGNIFICANT CHANGE UP (ref 4–5.6)
HCT VFR BLD CALC: 23.1 % — LOW (ref 34.5–45)
HGB BLD-MCNC: 6.9 G/DL — CRITICAL LOW (ref 11.5–15.5)
IMM GRANULOCYTES NFR BLD AUTO: 0.3 % — SIGNIFICANT CHANGE UP (ref 0–1.5)
IRON SATN MFR SERPL: 19 UG/DL — LOW (ref 30–160)
IRON SATN MFR SERPL: 227 UG/DL — SIGNIFICANT CHANGE UP (ref 140–530)
LYMPHOCYTES # BLD AUTO: 1.02 K/UL — SIGNIFICANT CHANGE UP (ref 1–3.3)
LYMPHOCYTES # BLD AUTO: 25.6 % — SIGNIFICANT CHANGE UP (ref 13–44)
MCHC RBC-ENTMCNC: 29.9 % — LOW (ref 32–36)
MCHC RBC-ENTMCNC: 31.2 PG — SIGNIFICANT CHANGE UP (ref 27–34)
MCV RBC AUTO: 104.5 FL — HIGH (ref 80–100)
MONOCYTES # BLD AUTO: 0.43 K/UL — SIGNIFICANT CHANGE UP (ref 0–0.9)
MONOCYTES NFR BLD AUTO: 10.8 % — SIGNIFICANT CHANGE UP (ref 2–14)
NEUTROPHILS # BLD AUTO: 2.43 K/UL — SIGNIFICANT CHANGE UP (ref 1.8–7.4)
NEUTROPHILS NFR BLD AUTO: 61 % — SIGNIFICANT CHANGE UP (ref 43–77)
NRBC # FLD: 0 K/UL — LOW (ref 25–125)
PLATELET # BLD AUTO: 390 K/UL — SIGNIFICANT CHANGE UP (ref 150–400)
PMV BLD: 9.4 FL — SIGNIFICANT CHANGE UP (ref 7–13)
POTASSIUM SERPL-MCNC: 3.8 MMOL/L — SIGNIFICANT CHANGE UP (ref 3.5–5.3)
POTASSIUM SERPL-SCNC: 3.8 MMOL/L — SIGNIFICANT CHANGE UP (ref 3.5–5.3)
RBC # BLD: 2.21 M/UL — LOW (ref 3.8–5.2)
RBC # FLD: 18.4 % — HIGH (ref 10.3–14.5)
SODIUM SERPL-SCNC: 136 MMOL/L — SIGNIFICANT CHANGE UP (ref 135–145)
SPECIMEN SOURCE: SIGNIFICANT CHANGE UP
UIBC SERPL-MCNC: 208.2 UG/DL — SIGNIFICANT CHANGE UP (ref 110–370)
WBC # BLD: 3.98 K/UL — SIGNIFICANT CHANGE UP (ref 3.8–10.5)
WBC # FLD AUTO: 3.98 K/UL — SIGNIFICANT CHANGE UP (ref 3.8–10.5)

## 2019-02-25 RX ORDER — DOCUSATE SODIUM 100 MG
100 CAPSULE ORAL
Qty: 0 | Refills: 0 | Status: DISCONTINUED | OUTPATIENT
Start: 2019-02-25 | End: 2019-03-11

## 2019-02-25 RX ADMIN — Medication 250 MILLIGRAM(S): at 22:09

## 2019-02-25 RX ADMIN — ENOXAPARIN SODIUM 40 MILLIGRAM(S): 100 INJECTION SUBCUTANEOUS at 12:33

## 2019-02-25 RX ADMIN — SODIUM CHLORIDE 100 MILLILITER(S): 9 INJECTION, SOLUTION INTRAVENOUS at 09:16

## 2019-02-25 RX ADMIN — Medication 250 MILLIGRAM(S): at 08:14

## 2019-02-25 RX ADMIN — OXYCODONE HYDROCHLORIDE 5 MILLIGRAM(S): 5 TABLET ORAL at 15:40

## 2019-02-25 RX ADMIN — OXYCODONE HYDROCHLORIDE 5 MILLIGRAM(S): 5 TABLET ORAL at 14:40

## 2019-02-25 RX ADMIN — OXYCODONE HYDROCHLORIDE 5 MILLIGRAM(S): 5 TABLET ORAL at 22:10

## 2019-02-25 RX ADMIN — Medication 100 MILLIGRAM(S): at 12:34

## 2019-02-25 RX ADMIN — PIPERACILLIN AND TAZOBACTAM 25 GRAM(S): 4; .5 INJECTION, POWDER, LYOPHILIZED, FOR SOLUTION INTRAVENOUS at 10:12

## 2019-02-25 RX ADMIN — Medication 100 MILLIGRAM(S): at 17:47

## 2019-02-25 RX ADMIN — OXYCODONE HYDROCHLORIDE 5 MILLIGRAM(S): 5 TABLET ORAL at 07:12

## 2019-02-25 RX ADMIN — OXYCODONE HYDROCHLORIDE 5 MILLIGRAM(S): 5 TABLET ORAL at 06:12

## 2019-02-25 RX ADMIN — PIPERACILLIN AND TAZOBACTAM 25 GRAM(S): 4; .5 INJECTION, POWDER, LYOPHILIZED, FOR SOLUTION INTRAVENOUS at 22:05

## 2019-02-25 RX ADMIN — Medication 1: at 17:47

## 2019-02-25 RX ADMIN — OXYCODONE HYDROCHLORIDE 5 MILLIGRAM(S): 5 TABLET ORAL at 23:09

## 2019-02-25 RX ADMIN — SIMVASTATIN 40 MILLIGRAM(S): 20 TABLET, FILM COATED ORAL at 22:10

## 2019-02-25 RX ADMIN — Medication 100 MILLIGRAM(S): at 06:13

## 2019-02-25 NOTE — PROGRESS NOTE ADULT - ASSESSMENT
Assessment/Plan: 83y Female presents with cellulitis and LLE wound dehiscence (s/p fall, initial presentation at OSH), now s/p wound debridement and replacement of wound VAC.     - Please consult Plastics for large wound closure  - Continue with wound vac. Management per Wound Care team.     Pager [phone]

## 2019-02-25 NOTE — PROGRESS NOTE ADULT - SUBJECTIVE AND OBJECTIVE BOX
ANESTHESIA POSTOP CHECK    83y Female POSTOP DAY 1 S/P     Vital Signs Last 24 Hrs  T(C): 36.7 (25 Feb 2019 06:09), Max: 36.9 (24 Feb 2019 15:56)  T(F): 98 (25 Feb 2019 06:09), Max: 98.4 (24 Feb 2019 15:56)  HR: 90 (25 Feb 2019 06:09) (89 - 121)  BP: 107/62 (25 Feb 2019 06:09) (90/45 - 120/68)  BP(mean): 80 (24 Feb 2019 18:45) (59 - 80)  RR: 18 (25 Feb 2019 06:09) (14 - 22)  SpO2: 99% (25 Feb 2019 06:09) (96% - 100%)  I&O's Summary    24 Feb 2019 07:01  -  25 Feb 2019 07:00  --------------------------------------------------------  IN: 1000 mL / OUT: 0 mL / NET: 1000 mL        [x ] NO APPARENT ANESTHESIA COMPLICATIONS      Comments:

## 2019-02-25 NOTE — CHART NOTE - NSCHARTNOTEFT_GEN_A_CORE
Dr. Smith, Plastics Surgery, called for consult.   Spoke to [facility] resident, who will see the patient.     ANGELA Mo, PGY-3  ACS  [phone]

## 2019-02-25 NOTE — CONSULT NOTE ADULT - SUBJECTIVE AND OBJECTIVE BOX
PLASTIC SURGERY CONSULT NOTE    CC:   HPI:   84yo Female with PMHx of a-fib (on Eliquis) and Diabetes who fell 2 weeks ago in the kitchen sustaining a left back and left lower leg wound. Patient was down for 48hrs before being found by daughter and brought to [facility]. At that hospital the patient had both wounds covered with wound vacs and was eventually discharged to a rehab facility. 2 days ago the patient became febrile and was noted to have a cellulitis around the left leg wound vac so was brought to [facility] and was admitted. On 2/24 the patient underwent a debridement of the left leg wound with placement of a new wound vac by general surgery. She was continued on vanc and zosyn. Plastic surgery now consulted for definitive left leg wound closure.    Review of systems: As per HPI, otherwise negative.     PAST MEDICAL & SURGICAL HISTORY:  Diabetes  Atrial fibrillation, unspecified type  No significant past surgical history    FAMILY HISTORY:  Family history of chronic ischemic heart disease (Father)      MEDICATIONS  (STANDING):  dextrose 50% Injectable 12.5 Gram(s) IV Push once  dextrose 50% Injectable 25 Gram(s) IV Push once  dextrose 50% Injectable 25 Gram(s) IV Push once  docusate sodium 100 milliGRAM(s) Oral two times a day  enoxaparin Injectable 40 milliGRAM(s) SubCutaneous daily  insulin lispro (HumaLOG) corrective regimen sliding scale   SubCutaneous three times a day before meals  lactated ringers. 1000 milliLiter(s) (100 mL/Hr) IV Continuous <Continuous>  piperacillin/tazobactam IVPB. 3.375 Gram(s) IV Intermittent every 8 hours  potassium chloride    Tablet ER 40 milliEquivalent(s) Oral once  pyridoxine 100 milliGRAM(s) Oral daily  simvastatin 40 milliGRAM(s) Oral at bedtime  topiramate 100 milliGRAM(s) Oral two times a day  vancomycin  IVPB 750 milliGRAM(s) IV Intermittent every 12 hours    MEDICATIONS  (PRN):  acetaminophen   Tablet .. 650 milliGRAM(s) Oral every 6 hours PRN Temp greater or equal to 38C (100.4F), Mild Pain (1 - 3)  dextrose 40% Gel 15 Gram(s) Oral once PRN Blood Glucose LESS THAN 70 milliGRAM(s)/deciLiter  glucagon  Injectable 1 milliGRAM(s) IntraMuscular once PRN Glucose <70 milliGRAM(s)/deciLiter  oxyCODONE    IR 5 milliGRAM(s) Oral every 6 hours PRN moderate to severe pain      T(C): 36.7 (02-25-19 @ 06:09), Max: 36.9 (02-24-19 @ 15:56)  HR: 90 (02-25-19 @ 06:09) (89 - 121)  BP: 107/62 (02-25-19 @ 06:09) (90/45 - 120/68)  RR: 18 (02-25-19 @ 06:09) (14 - 22)  SpO2: 99% (02-25-19 @ 06:09) (96% - 100%)  Wt(kg): --                        6.9    3.98  )-----------( 390      ( 25 Feb 2019 05:27 )             23.1     02-25    136  |  98  |  14  ----------------------------<  100<H>  3.8   |  26  |  0.67    Ca    8.2<L>      25 Feb 2019 05:20    TPro  6.5  /  Alb  3.1<L>  /  TBili  0.6  /  DBili  x   /  AST  20  /  ALT  13  /  AlkPhos  92  02-23      PEx:  Gen: NAD  Resp: nonlabored  Back: small wound vac in place, functional, holding suction  LLE: significant swelling of entire leg (similar to right side), wound vac in place, functional, holding suction, mild erythema surrounding vac (appropriate), SILT Sp/Dp/Eli/Sa, Fires EHL/FHL/EDC/Sol/Ga, DP and PT pulses 2+

## 2019-02-25 NOTE — PROGRESS NOTE ADULT - SUBJECTIVE AND OBJECTIVE BOX
Patient is a 83y old  Female who presents with a chief complaint of LLE wound dehiscence/cellulitis (25 Feb 2019 06:35)      INTERVAL HPI/OVERNIGHT EVENTS:    MEDICATIONS  (STANDING):  dextrose 50% Injectable 12.5 Gram(s) IV Push once  dextrose 50% Injectable 25 Gram(s) IV Push once  dextrose 50% Injectable 25 Gram(s) IV Push once  enoxaparin Injectable 40 milliGRAM(s) SubCutaneous daily  insulin lispro (HumaLOG) corrective regimen sliding scale   SubCutaneous three times a day before meals  lactated ringers. 1000 milliLiter(s) (100 mL/Hr) IV Continuous <Continuous>  piperacillin/tazobactam IVPB. 3.375 Gram(s) IV Intermittent every 8 hours  potassium chloride    Tablet ER 40 milliEquivalent(s) Oral once  pyridoxine 100 milliGRAM(s) Oral daily  simvastatin 40 milliGRAM(s) Oral at bedtime  topiramate 100 milliGRAM(s) Oral two times a day  vancomycin  IVPB 750 milliGRAM(s) IV Intermittent every 12 hours    MEDICATIONS  (PRN):  acetaminophen   Tablet .. 650 milliGRAM(s) Oral every 6 hours PRN Temp greater or equal to 38C (100.4F), Mild Pain (1 - 3)  dextrose 40% Gel 15 Gram(s) Oral once PRN Blood Glucose LESS THAN 70 milliGRAM(s)/deciLiter  glucagon  Injectable 1 milliGRAM(s) IntraMuscular once PRN Glucose <70 milliGRAM(s)/deciLiter  oxyCODONE    IR 5 milliGRAM(s) Oral every 6 hours PRN moderate to severe pain              Allergies    No Known Allergies    Intolerances        REVIEW OF SYSTEMS:  CARDIOVASCULAR: No chest pain, palpitations, dizziness, or leg swelling; no shortness of breath     RESPIRATORY: No cough, wheezing, chills or hemoptysis; No shortness of breath    GASTROINTESTINAL: No abdominal or epigastric pain. No nausea, vomiting, or hematemesis; No diarrhea or constipation. No melena or hematochezia.    NEUROLOGICAL: No headaches, memory loss, loss of strength, numbness      PHYSICAL EXAM:  Vital Signs Last 24 Hrs  T(C): 36.7 (25 Feb 2019 06:09), Max: 37.1 (24 Feb 2019 08:58)  T(F): 98 (25 Feb 2019 06:09), Max: 98.7 (24 Feb 2019 08:58)  HR: 90 (25 Feb 2019 06:09) (89 - 121)  BP: 107/62 (25 Feb 2019 06:09) (90/45 - 120/68)  BP(mean): 80 (24 Feb 2019 18:45) (59 - 80)  RR: 18 (25 Feb 2019 06:09) (14 - 22)  SpO2: 99% (25 Feb 2019 06:09) (96% - 100%)    GENERAL: NAD, well-groomed, well-developed  HEAD:  Atraumatic, Normocephalic  EYES: EOMI, PERRLA, conjunctiva and sclera clear  NECK: Supple, No JVD, Normal thyroid  NERVOUS SYSTEM:  Alert & Oriented X3, Good concentration;  and symmetric  CHEST/LUNG: Clear to auscultation bilaterally; No rales, rhonchi, wheezing, or rubs  HEART: S1S2 irregulqrly irregular, with II/VI systolic murmur at apex, without  rub nor gallop  ABDOMEN: Soft, Nontender, Nondistended; Bowel sounds present  EXTREMITIES:  2+ Peripheral Pulses, No clubbing, cyanosis, or edema      LABS:                        6.9    3.98  )-----------( 390      ( 25 Feb 2019 05:27 )             23.1     25 Feb 2019 05:20    136    |  98     |  14     ----------------------------<  100    3.8     |  26     |  0.67     Ca    8.2        25 Feb 2019 05:20      PT/INR - ( 23 Feb 2019 22:00 )   PT: 16.9 SEC;   INR: 1.50          PTT - ( 23 Feb 2019 22:00 )  PTT:36.7 SEC  CAPILLARY BLOOD GLUCOSE        TELEMETRY: ADELAIDA    RADIOLOGY & ADDITIONAL TESTS:       Consultant(s) Notes Reviewed:  See op note       assessment:  POD 1, anemia, AF     Plan:       Care Discussed with Consultants/Other Providers:

## 2019-02-25 NOTE — PROGRESS NOTE ADULT - SUBJECTIVE AND OBJECTIVE BOX
ACUTE CARE SURGERY  Pager: [phone]    STATUS POST:  LLE wound debridement, placement of wound VAC    POST OPERATIVE DAY #1      INTERVAL EVENTS/SUBJECTIVE:   No acute events overnight.   Reports having pain in the left leg temporarily relieved with oxycodone.  ______________________________________________  OBJECTIVE:   T(C): 36.7 (02-25-19 @ 06:09), Max: 37.1 (02-24-19 @ 08:58)  HR: 90 (02-25-19 @ 06:09) (89 - 121)  BP: 107/62 (02-25-19 @ 06:09) (90/45 - 120/68)  RR: 18 (02-25-19 @ 06:09) (14 - 22)  SpO2: 99% (02-25-19 @ 06:09) (96% - 100%)  Wt(kg): --  CAPILLARY BLOOD GLUCOSE      POCT Blood Glucose.: 125 mg/dL (24 Feb 2019 12:21)    I&O's Detail    24 Feb 2019 07:01  -  25 Feb 2019 06:35  --------------------------------------------------------  IN:    Lactated Ringers IV Bolus: 500 mL    lactated ringers.: 500 mL  Total IN: 1000 mL    OUT:  Total OUT: 0 mL    Total NET: 1000 mL          Physical exam:  Gen: NAD  LLE wound vac in place, on suction. Some surrounding cellulitis.   ______________________________________________  LABS:  CBC Full  -  ( 23 Feb 2019 22:00 )  WBC Count : 5.89 K/uL  Hemoglobin : 7.3 g/dL  Hematocrit : 24.0 %  Platelet Count - Automated : 414 K/uL  Mean Cell Volume : 102.1 fL  Mean Cell Hemoglobin : 31.1 pg  Mean Cell Hemoglobin Concentration : 30.4 %    02-23    132<L>  |  95<L>  |  25<H>  ----------------------------<  119<H>  3.4<L>   |  25  |  0.88    Ca    8.6      23 Feb 2019 22:00    TPro  6.5  /  Alb  3.1<L>  /  TBili  0.6  /  DBili  x   /  AST  20  /  ALT  13  /  AlkPhos  92  02-23

## 2019-02-25 NOTE — CHART NOTE - NSCHARTNOTEFT_GEN_A_CORE
Plastic surgery consult gave to Dom Dobbins who also wanted surgery([phone]) to speak to him.  Msg left with text page.  Treva MIRZA

## 2019-02-25 NOTE — PROGRESS NOTE ADULT - PROBLEM SELECTOR PLAN 4
restart Eliquis when ok with surgery.  Follow rate, may need low dose beta blocker or digoxin, depending on BP.

## 2019-02-26 LAB
ANION GAP SERPL CALC-SCNC: 11 MMO/L — SIGNIFICANT CHANGE UP (ref 7–14)
BUN SERPL-MCNC: 11 MG/DL — SIGNIFICANT CHANGE UP (ref 7–23)
CALCIUM SERPL-MCNC: 9 MG/DL — SIGNIFICANT CHANGE UP (ref 8.4–10.5)
CHLORIDE SERPL-SCNC: 99 MMOL/L — SIGNIFICANT CHANGE UP (ref 98–107)
CO2 SERPL-SCNC: 26 MMOL/L — SIGNIFICANT CHANGE UP (ref 22–31)
CREAT SERPL-MCNC: 0.71 MG/DL — SIGNIFICANT CHANGE UP (ref 0.5–1.3)
GLUCOSE BLDC GLUCOMTR-MCNC: 116 MG/DL — HIGH (ref 70–99)
GLUCOSE BLDC GLUCOMTR-MCNC: 139 MG/DL — HIGH (ref 70–99)
GLUCOSE BLDC GLUCOMTR-MCNC: 150 MG/DL — HIGH (ref 70–99)
GLUCOSE BLDC GLUCOMTR-MCNC: 155 MG/DL — HIGH (ref 70–99)
GLUCOSE SERPL-MCNC: 120 MG/DL — HIGH (ref 70–99)
HCT VFR BLD CALC: 28.8 % — LOW (ref 34.5–45)
HGB BLD-MCNC: 8.5 G/DL — LOW (ref 11.5–15.5)
MAGNESIUM SERPL-MCNC: 2 MG/DL — SIGNIFICANT CHANGE UP (ref 1.6–2.6)
MCHC RBC-ENTMCNC: 29.5 % — LOW (ref 32–36)
MCHC RBC-ENTMCNC: 29.5 PG — SIGNIFICANT CHANGE UP (ref 27–34)
MCV RBC AUTO: 100 FL — SIGNIFICANT CHANGE UP (ref 80–100)
NRBC # FLD: 0 K/UL — LOW (ref 25–125)
PLATELET # BLD AUTO: 483 K/UL — HIGH (ref 150–400)
PMV BLD: 9.2 FL — SIGNIFICANT CHANGE UP (ref 7–13)
POTASSIUM SERPL-MCNC: 3.7 MMOL/L — SIGNIFICANT CHANGE UP (ref 3.5–5.3)
POTASSIUM SERPL-SCNC: 3.7 MMOL/L — SIGNIFICANT CHANGE UP (ref 3.5–5.3)
RBC # BLD: 2.88 M/UL — LOW (ref 3.8–5.2)
RBC # FLD: 21.3 % — HIGH (ref 10.3–14.5)
SODIUM SERPL-SCNC: 136 MMOL/L — SIGNIFICANT CHANGE UP (ref 135–145)
WBC # BLD: 4.61 K/UL — SIGNIFICANT CHANGE UP (ref 3.8–10.5)
WBC # FLD AUTO: 4.61 K/UL — SIGNIFICANT CHANGE UP (ref 3.8–10.5)

## 2019-02-26 RX ORDER — LACTOBACILLUS ACIDOPHILUS 100MM CELL
1 CAPSULE ORAL
Qty: 0 | Refills: 0 | Status: DISCONTINUED | OUTPATIENT
Start: 2019-02-26 | End: 2019-03-11

## 2019-02-26 RX ORDER — OXYCODONE HYDROCHLORIDE 5 MG/1
5 TABLET ORAL ONCE
Qty: 0 | Refills: 0 | Status: DISCONTINUED | OUTPATIENT
Start: 2019-02-26 | End: 2019-02-26

## 2019-02-26 RX ORDER — METOPROLOL TARTRATE 50 MG
12.5 TABLET ORAL
Qty: 0 | Refills: 0 | Status: DISCONTINUED | OUTPATIENT
Start: 2019-02-26 | End: 2019-03-11

## 2019-02-26 RX ORDER — METOPROLOL TARTRATE 50 MG
12.5 TABLET ORAL DAILY
Qty: 0 | Refills: 0 | Status: DISCONTINUED | OUTPATIENT
Start: 2019-02-26 | End: 2019-02-26

## 2019-02-26 RX ADMIN — Medication 100 MILLIGRAM(S): at 12:11

## 2019-02-26 RX ADMIN — OXYCODONE HYDROCHLORIDE 5 MILLIGRAM(S): 5 TABLET ORAL at 22:14

## 2019-02-26 RX ADMIN — OXYCODONE HYDROCHLORIDE 5 MILLIGRAM(S): 5 TABLET ORAL at 19:22

## 2019-02-26 RX ADMIN — Medication 12.5 MILLIGRAM(S): at 17:37

## 2019-02-26 RX ADMIN — OXYCODONE HYDROCHLORIDE 5 MILLIGRAM(S): 5 TABLET ORAL at 23:14

## 2019-02-26 RX ADMIN — Medication 100 MILLIGRAM(S): at 17:36

## 2019-02-26 RX ADMIN — OXYCODONE HYDROCHLORIDE 5 MILLIGRAM(S): 5 TABLET ORAL at 16:01

## 2019-02-26 RX ADMIN — PIPERACILLIN AND TAZOBACTAM 25 GRAM(S): 4; .5 INJECTION, POWDER, LYOPHILIZED, FOR SOLUTION INTRAVENOUS at 21:51

## 2019-02-26 RX ADMIN — OXYCODONE HYDROCHLORIDE 5 MILLIGRAM(S): 5 TABLET ORAL at 11:27

## 2019-02-26 RX ADMIN — Medication 100 MILLIGRAM(S): at 06:22

## 2019-02-26 RX ADMIN — OXYCODONE HYDROCHLORIDE 5 MILLIGRAM(S): 5 TABLET ORAL at 10:59

## 2019-02-26 RX ADMIN — Medication 1 TABLET(S): at 17:36

## 2019-02-26 RX ADMIN — Medication 100 MILLIGRAM(S): at 17:37

## 2019-02-26 RX ADMIN — SIMVASTATIN 40 MILLIGRAM(S): 20 TABLET, FILM COATED ORAL at 22:14

## 2019-02-26 RX ADMIN — ENOXAPARIN SODIUM 40 MILLIGRAM(S): 100 INJECTION SUBCUTANEOUS at 12:11

## 2019-02-26 RX ADMIN — PIPERACILLIN AND TAZOBACTAM 25 GRAM(S): 4; .5 INJECTION, POWDER, LYOPHILIZED, FOR SOLUTION INTRAVENOUS at 02:33

## 2019-02-26 RX ADMIN — OXYCODONE HYDROCHLORIDE 5 MILLIGRAM(S): 5 TABLET ORAL at 06:22

## 2019-02-26 RX ADMIN — OXYCODONE HYDROCHLORIDE 5 MILLIGRAM(S): 5 TABLET ORAL at 07:22

## 2019-02-26 RX ADMIN — Medication 250 MILLIGRAM(S): at 10:59

## 2019-02-26 RX ADMIN — PIPERACILLIN AND TAZOBACTAM 25 GRAM(S): 4; .5 INJECTION, POWDER, LYOPHILIZED, FOR SOLUTION INTRAVENOUS at 12:11

## 2019-02-26 NOTE — PROGRESS NOTE ADULT - SUBJECTIVE AND OBJECTIVE BOX
Patient is a 83y old  Female who presents with a chief complaint of LLE wound dehiscence/cellulitis (25 Feb 2019 13:34)      INTERVAL HPI/OVERNIGHT EVENTS: transfused     MEDICATIONS  (STANDING):  dextrose 50% Injectable 12.5 Gram(s) IV Push once  dextrose 50% Injectable 25 Gram(s) IV Push once  dextrose 50% Injectable 25 Gram(s) IV Push once  docusate sodium 100 milliGRAM(s) Oral two times a day  enoxaparin Injectable 40 milliGRAM(s) SubCutaneous daily  insulin lispro (HumaLOG) corrective regimen sliding scale   SubCutaneous three times a day before meals  lactated ringers. 1000 milliLiter(s) (100 mL/Hr) IV Continuous <Continuous>  piperacillin/tazobactam IVPB. 3.375 Gram(s) IV Intermittent every 8 hours  potassium chloride    Tablet ER 40 milliEquivalent(s) Oral once  pyridoxine 100 milliGRAM(s) Oral daily  simvastatin 40 milliGRAM(s) Oral at bedtime  topiramate 100 milliGRAM(s) Oral two times a day  vancomycin  IVPB 750 milliGRAM(s) IV Intermittent every 12 hours    MEDICATIONS  (PRN):  acetaminophen   Tablet .. 650 milliGRAM(s) Oral every 6 hours PRN Temp greater or equal to 38C (100.4F), Mild Pain (1 - 3)  dextrose 40% Gel 15 Gram(s) Oral once PRN Blood Glucose LESS THAN 70 milliGRAM(s)/deciLiter  glucagon  Injectable 1 milliGRAM(s) IntraMuscular once PRN Glucose <70 milliGRAM(s)/deciLiter  oxyCODONE    IR 5 milliGRAM(s) Oral every 6 hours PRN moderate to severe pain            Allergies    No Known Allergies    Intolerances        REVIEW OF SYSTEMS:  CARDIOVASCULAR: No chest pain, palpitations, dizziness, or leg swelling; no shortness of breath     RESPIRATORY: No cough, wheezing, chills or hemoptysis; No shortness of breath    GASTROINTESTINAL: No abdominal or epigastric pain. No nausea, vomiting, or hematemesis; No diarrhea or constipation. No melena or hematochezia.    NEUROLOGICAL: No headaches, memory loss, loss of strength, numbness      PHYSICAL EXAM:  Vital Signs Last 24 Hrs  T(C): 37 (26 Feb 2019 06:21), Max: 37.1 (25 Feb 2019 20:59)  T(F): 98.6 (26 Feb 2019 06:21), Max: 98.7 (25 Feb 2019 20:59)  HR: 80 (26 Feb 2019 06:21) (80 - 89)  BP: 107/60 (26 Feb 2019 06:21) (101/62 - 108/61)  BP(mean): --  RR: 18 (26 Feb 2019 06:21) (18 - 18)  SpO2: 98% (26 Feb 2019 06:21) (95% - 100%)    GENERAL: NAD, well-groomed, well-developed  HEAD:  Atraumatic, Normocephalic  EYES: EOMI, PERRLA, conjunctiva and sclera clear  NECK: Supple, No JVD, Normal thyroid  NERVOUS SYSTEM:  Alert & Oriented X3, Good concentration;  and symmetric  CHEST/LUNG: Clear to auscultation bilaterally; No rales, rhonchi, wheezing, or rubs  HEART: S1S2 irregularly irregular, without murmur, rub nor gallop  ABDOMEN: Soft, Nontender, Nondistended; Bowel sounds present        LABS:      Ca    8.2        25 Feb 2019 05:20        CAPILLARY BLOOD GLUCOSE      POCT Blood Glucose.: 149 mg/dL (25 Feb 2019 22:50)  POCT Blood Glucose.: 163 mg/dL (25 Feb 2019 17:09)  POCT Blood Glucose.: 141 mg/dL (25 Feb 2019 12:34)  POCT Blood Glucose.: 138 mg/dL (25 Feb 2019 08:50)             assessment:  POD 2.  Management per surgery.  ? Need for continued antibiotics?    Will add low dose beta blocker for rate control.     Discussed with Consultants/Other Providers: pt's daughter [phone]

## 2019-02-26 NOTE — PROGRESS NOTE ADULT - SUBJECTIVE AND OBJECTIVE BOX
Plastic Surgery  Daily Progress Note     Subjective/24 Hour Events:  Patient seen and examined. No acute events overnight. Left leg vac and Left back vacs changed today. Wound care to change both on Friday then MWF next week.     Objective:    Allergies:  No Known Allergies      Meds:   acetaminophen   Tablet .. 650 milliGRAM(s) Oral every 6 hours PRN  dextrose 40% Gel 15 Gram(s) Oral once PRN  dextrose 50% Injectable 12.5 Gram(s) IV Push once  dextrose 50% Injectable 25 Gram(s) IV Push once  dextrose 50% Injectable 25 Gram(s) IV Push once  docusate sodium 100 milliGRAM(s) Oral two times a day  enoxaparin Injectable 40 milliGRAM(s) SubCutaneous daily  glucagon  Injectable 1 milliGRAM(s) IntraMuscular once PRN  insulin lispro (HumaLOG) corrective regimen sliding scale   SubCutaneous three times a day before meals  lactated ringers. 1000 milliLiter(s) IV Continuous <Continuous>  metoprolol tartrate 12.5 milliGRAM(s) Oral daily  oxyCODONE    IR 5 milliGRAM(s) Oral every 6 hours PRN  oxyCODONE    IR 5 milliGRAM(s) Oral once  piperacillin/tazobactam IVPB. 3.375 Gram(s) IV Intermittent every 8 hours  potassium chloride    Tablet ER 40 milliEquivalent(s) Oral once  pyridoxine 100 milliGRAM(s) Oral daily  simvastatin 40 milliGRAM(s) Oral at bedtime  topiramate 100 milliGRAM(s) Oral two times a day  vancomycin  IVPB 750 milliGRAM(s) IV Intermittent every 12 hours      Vitals:  T(C): 37 (02-26-19 @ 06:21), Max: 37.1 (02-25-19 @ 20:59)  HR: 80 (02-26-19 @ 06:21) (80 - 89)  BP: 107/60 (02-26-19 @ 06:21) (101/62 - 108/61)  RR: 18 (02-26-19 @ 06:21) (18 - 18)  SpO2: 98% (02-26-19 @ 06:21) (95% - 100%)          Physical Exam:   Gen: NAD  Resp: nonlabored  LLE:  significant diffuse edema  wound vac removed  wound [id number]  no exposed bone  no active bleeding  minimal exudate   no collections  no extending erythema  wound vac changed      Back:  wound vac removed  Triangular shaped wound [id number]  minimal exudate   no active bleeding  no collections  no extending erythema   wound vac changed                             8.5    4.61  )-----------( 483      ( 26 Feb 2019 08:00 )             28.8   02-26    136  |  99  |  11  ----------------------------<  120<H>  3.7   |  26  |  0.71    Ca    9.0      26 Feb 2019 08:00  Mg     2.0     02-26

## 2019-02-26 NOTE — PHYSICAL THERAPY INITIAL EVALUATION ADULT - LEVEL OF INDEPENDENCE: SIT/STAND, REHAB EVAL
unable to perform/while sitting at edge of bed Pt. HR elevated to 160 bpm, Pt. reporting no symptoms, XIMENA CHACKO present. Pt. returned supine in bed and in NAD. HR returned to 103 bpm after 30 second rest break. XIMENA CHACKO aware

## 2019-02-26 NOTE — PROGRESS NOTE ADULT - ASSESSMENT
A/P: 83yoF with left back wound vac and left leg wound vac s/p debridement by gen surg 2/25, plastic consulted for definitive closure of left leg wound.       Wound care to change both wound vacs (left leg and back) this Friday 3/1, then MWF next week  Recommend primary team consults Dr. Berrios of wound care for prolonged wound care as outpatient  Plan for Discharge to rehab/home with wound vac with plan for outpatient STSG closure of leg wound with Dr. Smith  Coordinate with case management for VNS for wound vac care  Appreciate Gen surg recs  F/U with Dr. Smith 7-10days from discharge  Neuro: Pain control  Resp: IS  GI: Reg diet, bowel regimen  MSK: OOB to chair, WBAT, PT/OT, ACE wrap applied to left leg to decrease swelling  Heme: DVT PPX per primary team currently on lovenox  ID: c/w vanco and zosyn, follow-up OR cultures: prelim: gram neg rods     [id number]

## 2019-02-26 NOTE — PHYSICAL THERAPY INITIAL EVALUATION ADULT - MANUAL MUSCLE TESTING RESULTS, REHAB EVAL
Bilateral UE muscle strength grossly 3/5 Throughout; Bilateral LE muscle strength grossly 3/5 Throughout however, bilateral hip strength grossly 3-/5 throughout.

## 2019-02-26 NOTE — PHYSICAL THERAPY INITIAL EVALUATION ADULT - PERTINENT HX OF CURRENT PROBLEM, REHAB EVAL
Pt. is a 83 year old female admitted to [facility] secondary to open wound of left LE. PMH: atrial fibrillation, Diabetes.

## 2019-02-27 LAB
-  AMIKACIN: SIGNIFICANT CHANGE UP
-  AZTREONAM: SIGNIFICANT CHANGE UP
-  CEFEPIME: SIGNIFICANT CHANGE UP
-  CEFTAZIDIME: SIGNIFICANT CHANGE UP
-  CEFTRIAXONE: SIGNIFICANT CHANGE UP
-  CIPROFLOXACIN: SIGNIFICANT CHANGE UP
-  GENTAMICIN: SIGNIFICANT CHANGE UP
-  IMIPENEM: SIGNIFICANT CHANGE UP
-  LEVOFLOXACIN: SIGNIFICANT CHANGE UP
-  MEROPENEM: SIGNIFICANT CHANGE UP
-  PIPERACILLIN/TAZOBACTAM: SIGNIFICANT CHANGE UP
-  TOBRAMYCIN: SIGNIFICANT CHANGE UP
-  TRIMETHOPRIM/SULFAMETHOXAZOLE: SIGNIFICANT CHANGE UP
BACTERIA SKIN AEROBE CULT: SIGNIFICANT CHANGE UP
GLUCOSE BLDC GLUCOMTR-MCNC: 111 MG/DL — HIGH (ref 70–99)
GLUCOSE BLDC GLUCOMTR-MCNC: 122 MG/DL — HIGH (ref 70–99)
GLUCOSE BLDC GLUCOMTR-MCNC: 148 MG/DL — HIGH (ref 70–99)
GLUCOSE BLDC GLUCOMTR-MCNC: 178 MG/DL — HIGH (ref 70–99)
GRAM STN WND: SIGNIFICANT CHANGE UP
METHOD TYPE: SIGNIFICANT CHANGE UP
ORGANISM # SPEC MICROSCOPIC CNT: SIGNIFICANT CHANGE UP
VANCOMYCIN FLD-MCNC: 11.2 UG/ML — SIGNIFICANT CHANGE UP

## 2019-02-27 PROCEDURE — 99223 1ST HOSP IP/OBS HIGH 75: CPT

## 2019-02-27 RX ORDER — SENNA PLUS 8.6 MG/1
2 TABLET ORAL AT BEDTIME
Qty: 0 | Refills: 0 | Status: DISCONTINUED | OUTPATIENT
Start: 2019-02-27 | End: 2019-03-11

## 2019-02-27 RX ADMIN — Medication 100 MILLIGRAM(S): at 06:16

## 2019-02-27 RX ADMIN — PIPERACILLIN AND TAZOBACTAM 25 GRAM(S): 4; .5 INJECTION, POWDER, LYOPHILIZED, FOR SOLUTION INTRAVENOUS at 16:01

## 2019-02-27 RX ADMIN — OXYCODONE HYDROCHLORIDE 5 MILLIGRAM(S): 5 TABLET ORAL at 06:16

## 2019-02-27 RX ADMIN — SENNA PLUS 2 TABLET(S): 8.6 TABLET ORAL at 20:36

## 2019-02-27 RX ADMIN — Medication 100 MILLIGRAM(S): at 17:34

## 2019-02-27 RX ADMIN — ENOXAPARIN SODIUM 40 MILLIGRAM(S): 100 INJECTION SUBCUTANEOUS at 11:36

## 2019-02-27 RX ADMIN — OXYCODONE HYDROCHLORIDE 5 MILLIGRAM(S): 5 TABLET ORAL at 21:37

## 2019-02-27 RX ADMIN — Medication 250 MILLIGRAM(S): at 20:36

## 2019-02-27 RX ADMIN — OXYCODONE HYDROCHLORIDE 5 MILLIGRAM(S): 5 TABLET ORAL at 15:56

## 2019-02-27 RX ADMIN — Medication 100 MILLIGRAM(S): at 17:35

## 2019-02-27 RX ADMIN — Medication 40 MILLIEQUIVALENT(S): at 08:53

## 2019-02-27 RX ADMIN — Medication 1 TABLET(S): at 08:53

## 2019-02-27 RX ADMIN — Medication 250 MILLIGRAM(S): at 00:23

## 2019-02-27 RX ADMIN — Medication 100 MILLIGRAM(S): at 11:36

## 2019-02-27 RX ADMIN — OXYCODONE HYDROCHLORIDE 5 MILLIGRAM(S): 5 TABLET ORAL at 14:23

## 2019-02-27 RX ADMIN — Medication 250 MILLIGRAM(S): at 13:19

## 2019-02-27 RX ADMIN — OXYCODONE HYDROCHLORIDE 5 MILLIGRAM(S): 5 TABLET ORAL at 07:16

## 2019-02-27 RX ADMIN — OXYCODONE HYDROCHLORIDE 5 MILLIGRAM(S): 5 TABLET ORAL at 20:37

## 2019-02-27 RX ADMIN — PIPERACILLIN AND TAZOBACTAM 25 GRAM(S): 4; .5 INJECTION, POWDER, LYOPHILIZED, FOR SOLUTION INTRAVENOUS at 22:40

## 2019-02-27 RX ADMIN — Medication 12.5 MILLIGRAM(S): at 17:34

## 2019-02-27 RX ADMIN — PIPERACILLIN AND TAZOBACTAM 25 GRAM(S): 4; .5 INJECTION, POWDER, LYOPHILIZED, FOR SOLUTION INTRAVENOUS at 07:48

## 2019-02-27 RX ADMIN — SIMVASTATIN 40 MILLIGRAM(S): 20 TABLET, FILM COATED ORAL at 20:37

## 2019-02-27 RX ADMIN — Medication 1 TABLET(S): at 17:34

## 2019-02-27 NOTE — PROGRESS NOTE ADULT - SUBJECTIVE AND OBJECTIVE BOX
Patient is a 83y old  Female who presents with a chief complaint of LLE wound dehiscence/cellulitis (27 Feb 2019 07:43)      INTERVAL HPI/OVERNIGHT EVENTS:    MEDICATIONS  (STANDING):  dextrose 50% Injectable 12.5 Gram(s) IV Push once  dextrose 50% Injectable 25 Gram(s) IV Push once  dextrose 50% Injectable 25 Gram(s) IV Push once  docusate sodium 100 milliGRAM(s) Oral two times a day  enoxaparin Injectable 40 milliGRAM(s) SubCutaneous daily  insulin lispro (HumaLOG) corrective regimen sliding scale   SubCutaneous three times a day before meals  lactated ringers. 1000 milliLiter(s) (100 mL/Hr) IV Continuous <Continuous>  lactobacillus acidophilus 1 Tablet(s) Oral two times a day with meals  metoprolol tartrate 12.5 milliGRAM(s) Oral two times a day  piperacillin/tazobactam IVPB. 3.375 Gram(s) IV Intermittent every 8 hours  potassium chloride    Tablet ER 40 milliEquivalent(s) Oral once  pyridoxine 100 milliGRAM(s) Oral daily  simvastatin 40 milliGRAM(s) Oral at bedtime  topiramate 100 milliGRAM(s) Oral two times a day  vancomycin  IVPB 750 milliGRAM(s) IV Intermittent every 12 hours                Allergies    No Known Allergies    Intolerances        REVIEW OF SYSTEMS:  CARDIOVASCULAR: No chest pain, palpitations, dizziness, or leg swelling; no shortness of breath     RESPIRATORY: No cough, wheezing, chills or hemoptysis; No shortness of breath    GASTROINTESTINAL: No abdominal or epigastric pain. No nausea, vomiting, or hematemesis; No diarrhea or constipation. No melena or hematochezia.    NEUROLOGICAL: No headaches, memory loss, loss of strength, numbness      PHYSICAL EXAM:  Vital Signs Last 24 Hrs  T(C): 36.3 (27 Feb 2019 06:11), Max: 36.8 (26 Feb 2019 12:07)  T(F): 97.3 (27 Feb 2019 06:11), Max: 98.3 (26 Feb 2019 12:07)  HR: 86 (27 Feb 2019 06:11) (66 - 160)  BP: 99/60 (27 Feb 2019 06:11) (99/60 - 117/60)  BP(mean): --  RR: 18 (27 Feb 2019 06:11) (18 - 18)  SpO2: 100% (27 Feb 2019 06:11) (100% - 100%)    GENERAL: NAD, well-groomed, well-developed  HEAD:  Atraumatic, Normocephalic  EYES: EOMI, PERRLA, conjunctiva and sclera clear  NECK: Supple, No JVD, Normal thyroid  NERVOUS SYSTEM:  Alert & Oriented X3, Good concentration;  and symmetric  CHEST/LUNG: Clear to auscultation bilaterally; No rales, rhonchi, wheezing, or rubs  HEART: S1S2 irregularly irregular, without murmur, rub nor gallop  ABDOMEN: Soft, Nontender, Nondistended; Bowel sounds present        LABS:      Ca    9.0        26 Feb 2019 08:00        CAPILLARY BLOOD GLUCOSE      POCT Blood Glucose.: 111 mg/dL (27 Feb 2019 08:42)  POCT Blood Glucose.: 150 mg/dL (26 Feb 2019 22:19)  POCT Blood Glucose.: 139 mg/dL (26 Feb 2019 17:48)  POCT Blood Glucose.: 155 mg/dL (26 Feb 2019 12:27)        assessment:  POD 3,  HR better controlled with low dose beta blocker and fluids.     Plan:   Surgical follow up.

## 2019-02-27 NOTE — CONSULT NOTE ADULT - SUBJECTIVE AND OBJECTIVE BOX
Patient is a 83y old  Female who presents with a chief complaint of LLE wound dehiscence/cellulitis  PMH DM, Afib on Eliquis, s/p fall from a stool in her kitchen two weeks ago on Feb 9 and was impaled on her back on a kitchen appliance for 48 hours before her daughter found her, hospitalized at [facility] for acute blood loss anemia due to hematoma and had a hemoglobin of 6 (baseline around 9) requiring 3 units of pRBC, seen by surgeon Dr. Dorian Lazaro and had wound VAc placed to her left back  and LLE (wound VAC LLE removed prior to discharge to rehab 4 days ago, who now presents with wound dehiscence LLE and low grade fever 99F at rehab and was started on zosyn via LUE PICC placed a day ago. Patient denies chest pain/dizziness, sob, cough, abd pain, diarrhea.In ED, CT LLE severe diffuse subcutaneous edema in the extremities, however, no collection or abscess and no advance OM, no necrotizing infection. (24 Feb 2019 09:58)  went to OR for debridment on 2/25, 1 additional prbc, currently with vac on leg 125 and vac( to wall suction) on her left back  As reported by plastic resident  dr patel  wound on lower left leg is [id number] , vac placed by team yesterday  REVIEW OF SYSTEMS- refusing vac change on leg too painful, other systems negative  Allergies    No Known Allergies    Intolerances       MEDICATIONS  (STANDING):  dextrose 50% Injectable 12.5 Gram(s) IV Push once  dextrose 50% Injectable 25 Gram(s) IV Push once  dextrose 50% Injectable 25 Gram(s) IV Push once  docusate sodium 100 milliGRAM(s) Oral two times a day  enoxaparin Injectable 40 milliGRAM(s) SubCutaneous daily  insulin lispro (HumaLOG) corrective regimen sliding scale   SubCutaneous three times a day before meals  lactated ringers. 1000 milliLiter(s) (100 mL/Hr) IV Continuous <Continuous>  lactobacillus acidophilus 1 Tablet(s) Oral two times a day with meals  metoprolol tartrate 12.5 milliGRAM(s) Oral two times a day  piperacillin/tazobactam IVPB. 3.375 Gram(s) IV Intermittent every 8 hours  pyridoxine 100 milliGRAM(s) Oral daily  senna 2 Tablet(s) Oral at bedtime  simvastatin 40 milliGRAM(s) Oral at bedtime  topiramate 100 milliGRAM(s) Oral two times a day  vancomycin  IVPB 750 milliGRAM(s) IV Intermittent every 12 hours    MEDICATIONS  (PRN):  acetaminophen   Tablet .. 650 milliGRAM(s) Oral every 6 hours PRN Temp greater or equal to 38C (100.4F), Mild Pain (1 - 3)  dextrose 40% Gel 15 Gram(s) Oral once PRN Blood Glucose LESS THAN 70 milliGRAM(s)/deciLiter  glucagon  Injectable 1 milliGRAM(s) IntraMuscular once PRN Glucose <70 milliGRAM(s)/deciLiter  oxyCODONE    IR 5 milliGRAM(s) Oral every 6 hours PRN moderate to severe pain      FAMILY HISTORY:  Family history of chronic ischemic heart disease (Father)      Social history:Has daughter as next of kin    PAST MEDICAL & SURGICAL HISTORY:  Diabetes  Atrial fibrillation, unspecified type  right knee replacement    I&O's Summary      Vital Signs Last 24 Hrs  T(C): 36.3 (27 Feb 2019 06:11), Max: 36.8 (26 Feb 2019 12:07)  T(F): 97.3 (27 Feb 2019 06:11), Max: 98.3 (26 Feb 2019 12:07)  HR: 86 (27 Feb 2019 06:11) (66 - 160)  BP: 99/60 (27 Feb 2019 06:11) (99/60 - 117/60)  BP(mean): --  RR: 18 (27 Feb 2019 06:11) (18 - 18)  SpO2: 100% (27 Feb 2019 06:11) (100% - 100%)    Height (cm): 162.56 (02-27 @ 06:11)  Weight (kg): 86.2 (02-27 @ 06:11)  BMI (kg/m2): 32.6 (02-27 @ 06:11)  BSA (m2): 1.91 (02-27 @ 06:11)    PHYSICAL EXAM:  Constitutional: awake in bed , nad  ENMT:perrla  Back:left sub scapular area with black foam/ foam and tegaderm vac to wall suction, 25 cc in canister; 4l1c6or slough, no necrosis 95% granular  medihoney and alginate and foam applied  Respiratory:on nasal  o2, clear  Cardiovascular:rrr dominick fib  Gastrointestinal:non tender  gu : commode at bedside  Extremities: vac on leg leg with ace, patient refusing exam  toes pink, edematous, with neuropathy  Skin:as noted,  Musculoskeletal: rom limited on left, 6inch hematoma with ecchymosis left buttock cheek,  vasc right foot cap refill <3s, left foot> 3s, non palp pulses  ankle right 23, left 29cm with wrap, pt has right leg compression sock in place  able to flex ankles  CBC Full  -  ( 26 Feb 2019 08:00 )  WBC Count : 4.61 K/uL  Hemoglobin : 8.5 g/dL  Hematocrit : 28.8 %  Platelet Count - Automated : 483 K/uL  Mean Cell Volume : 100.0 fL  Mean Cell Hemoglobin : 29.5 pg  Mean Cell Hemoglobin Concentration : 29.5 %         02-26    136  |  99  |  11  ----------------------------<  120<H>  3.7   |  26  |  0.71    Ca    9.0      26 Feb 2019 08:00  Mg     2.0     02-26  pt 16.9 inr 1.5 feb 23      culture psuedomonas putida- sens pending    hga1c 5.2      Radiology: ct suprapatellar effusion left knee, thigh and foot, calcification left pop and leg  2/24

## 2019-02-27 NOTE — PROGRESS NOTE ADULT - ASSESSMENT
A/P: 83yoF with left back wound vac and left leg wound vac s/p debridement by gen surg 2/25, plastic consulted for definitive closure of left leg wound.       Wound care to change both wound vacs (left leg and back) this Friday 3/1, then MWF next week  Recommend primary team consults Dr. Berrios of wound care for prolonged wound care as outpatient  Plan for Discharge to rehab/home with wound vac with plan for outpatient STSG closure of leg wound with Dr. Smith  Coordinate with case management for VNS for wound vac care  Appreciate Gen surg recs  F/U with Dr. Smith 7-10days from discharge  Neuro: Pain control  Resp: IS  GI: Reg diet, bowel regimen  MSK: OOB to chair, WBAT, PT/OT, ACE wrap applied to left leg to decrease swelling  Heme: DVT PPX per primary team currently on lovenox  ID: c/w vanco and zosyn, follow-up OR cultures: prelim: pseudomonas and coag neg staph     [id number]

## 2019-02-27 NOTE — PROGRESS NOTE ADULT - SUBJECTIVE AND OBJECTIVE BOX
Plastic Surgery  Daily Progress Note     Subjective/24 Hour Events:  Patient seen and examined. No acute events overnight. Left leg vac and Left back vacs functional. Wound care to change both on Friday then MWF next week.     Objective:    Allergies:  No Known Allergies      Meds:   acetaminophen   Tablet .. 650 milliGRAM(s) Oral every 6 hours PRN  dextrose 40% Gel 15 Gram(s) Oral once PRN  dextrose 50% Injectable 12.5 Gram(s) IV Push once  dextrose 50% Injectable 25 Gram(s) IV Push once  dextrose 50% Injectable 25 Gram(s) IV Push once  docusate sodium 100 milliGRAM(s) Oral two times a day  enoxaparin Injectable 40 milliGRAM(s) SubCutaneous daily  glucagon  Injectable 1 milliGRAM(s) IntraMuscular once PRN  insulin lispro (HumaLOG) corrective regimen sliding scale   SubCutaneous three times a day before meals  lactated ringers. 1000 milliLiter(s) IV Continuous <Continuous>  metoprolol tartrate 12.5 milliGRAM(s) Oral daily  oxyCODONE    IR 5 milliGRAM(s) Oral every 6 hours PRN  oxyCODONE    IR 5 milliGRAM(s) Oral once  piperacillin/tazobactam IVPB. 3.375 Gram(s) IV Intermittent every 8 hours  potassium chloride    Tablet ER 40 milliEquivalent(s) Oral once  pyridoxine 100 milliGRAM(s) Oral daily  simvastatin 40 milliGRAM(s) Oral at bedtime  topiramate 100 milliGRAM(s) Oral two times a day  vancomycin  IVPB 750 milliGRAM(s) IV Intermittent every 12 hours      Vital Signs Last 24 Hrs  T(C): 36.3 (27 Feb 2019 06:11), Max: 36.8 (26 Feb 2019 12:07)  T(F): 97.3 (27 Feb 2019 06:11), Max: 98.3 (26 Feb 2019 12:07)  HR: 86 (27 Feb 2019 06:11) (66 - 160)  BP: 99/60 (27 Feb 2019 06:11) (99/60 - 117/60)  BP(mean): --  RR: 18 (27 Feb 2019 06:11) (18 - 18)  SpO2: 100% (27 Feb 2019 06:11) (100% - 100%)        Physical Exam:   Gen: NAD  Resp: nonlabored  LLE:  Wound vac intact, functional  Edematous  WWP  Back:  Wound vac intact, functional                              8.5    4.61  )-----------( 483      ( 26 Feb 2019 08:00 )             28.8   02-26    136  |  99  |  11  ----------------------------<  120<H>  3.7   |  26  |  0.71    Ca    9.0      26 Feb 2019 08:00  Mg     2.0     02-26

## 2019-02-28 DIAGNOSIS — S81.802A UNSPECIFIED OPEN WOUND, LEFT LOWER LEG, INITIAL ENCOUNTER: ICD-10-CM

## 2019-02-28 LAB
ANION GAP SERPL CALC-SCNC: 10 MMO/L — SIGNIFICANT CHANGE UP (ref 7–14)
BUN SERPL-MCNC: 11 MG/DL — SIGNIFICANT CHANGE UP (ref 7–23)
CALCIUM SERPL-MCNC: 8.9 MG/DL — SIGNIFICANT CHANGE UP (ref 8.4–10.5)
CHLORIDE SERPL-SCNC: 104 MMOL/L — SIGNIFICANT CHANGE UP (ref 98–107)
CO2 SERPL-SCNC: 25 MMOL/L — SIGNIFICANT CHANGE UP (ref 22–31)
CREAT SERPL-MCNC: 0.8 MG/DL — SIGNIFICANT CHANGE UP (ref 0.5–1.3)
GLUCOSE BLDC GLUCOMTR-MCNC: 120 MG/DL — HIGH (ref 70–99)
GLUCOSE BLDC GLUCOMTR-MCNC: 127 MG/DL — HIGH (ref 70–99)
GLUCOSE BLDC GLUCOMTR-MCNC: 145 MG/DL — HIGH (ref 70–99)
GLUCOSE BLDC GLUCOMTR-MCNC: 147 MG/DL — HIGH (ref 70–99)
GLUCOSE SERPL-MCNC: 113 MG/DL — HIGH (ref 70–99)
HCT VFR BLD CALC: 24.7 % — LOW (ref 34.5–45)
HCT VFR BLD CALC: 28.6 % — LOW (ref 34.5–45)
HGB BLD-MCNC: 7.4 G/DL — LOW (ref 11.5–15.5)
HGB BLD-MCNC: 8.5 G/DL — LOW (ref 11.5–15.5)
MCHC RBC-ENTMCNC: 29.6 PG — SIGNIFICANT CHANGE UP (ref 27–34)
MCHC RBC-ENTMCNC: 29.7 % — LOW (ref 32–36)
MCHC RBC-ENTMCNC: 29.7 PG — SIGNIFICANT CHANGE UP (ref 27–34)
MCHC RBC-ENTMCNC: 30 % — LOW (ref 32–36)
MCV RBC AUTO: 99.2 FL — SIGNIFICANT CHANGE UP (ref 80–100)
MCV RBC AUTO: 99.7 FL — SIGNIFICANT CHANGE UP (ref 80–100)
NRBC # FLD: 0 K/UL — LOW (ref 25–125)
NRBC # FLD: 0 K/UL — LOW (ref 25–125)
PLATELET # BLD AUTO: 390 K/UL — SIGNIFICANT CHANGE UP (ref 150–400)
PLATELET # BLD AUTO: 445 K/UL — HIGH (ref 150–400)
PMV BLD: 9.2 FL — SIGNIFICANT CHANGE UP (ref 7–13)
PMV BLD: 9.2 FL — SIGNIFICANT CHANGE UP (ref 7–13)
POTASSIUM SERPL-MCNC: 3.8 MMOL/L — SIGNIFICANT CHANGE UP (ref 3.5–5.3)
POTASSIUM SERPL-SCNC: 3.8 MMOL/L — SIGNIFICANT CHANGE UP (ref 3.5–5.3)
RBC # BLD: 2.49 M/UL — LOW (ref 3.8–5.2)
RBC # BLD: 2.87 M/UL — LOW (ref 3.8–5.2)
RBC # FLD: 20.8 % — HIGH (ref 10.3–14.5)
RBC # FLD: 20.9 % — HIGH (ref 10.3–14.5)
SODIUM SERPL-SCNC: 139 MMOL/L — SIGNIFICANT CHANGE UP (ref 135–145)
VANCOMYCIN FLD-MCNC: 12.9 UG/ML — SIGNIFICANT CHANGE UP
WBC # BLD: 3.36 K/UL — LOW (ref 3.8–10.5)
WBC # BLD: 3.77 K/UL — LOW (ref 3.8–10.5)
WBC # FLD AUTO: 3.36 K/UL — LOW (ref 3.8–10.5)
WBC # FLD AUTO: 3.77 K/UL — LOW (ref 3.8–10.5)

## 2019-02-28 PROCEDURE — 93922 UPR/L XTREMITY ART 2 LEVELS: CPT | Mod: 26

## 2019-02-28 PROCEDURE — 99223 1ST HOSP IP/OBS HIGH 75: CPT | Mod: GC

## 2019-02-28 RX ADMIN — Medication 100 MILLIGRAM(S): at 05:08

## 2019-02-28 RX ADMIN — OXYCODONE HYDROCHLORIDE 5 MILLIGRAM(S): 5 TABLET ORAL at 06:09

## 2019-02-28 RX ADMIN — Medication 250 MILLIGRAM(S): at 09:24

## 2019-02-28 RX ADMIN — ENOXAPARIN SODIUM 40 MILLIGRAM(S): 100 INJECTION SUBCUTANEOUS at 11:23

## 2019-02-28 RX ADMIN — Medication 1 TABLET(S): at 17:45

## 2019-02-28 RX ADMIN — OXYCODONE HYDROCHLORIDE 5 MILLIGRAM(S): 5 TABLET ORAL at 23:48

## 2019-02-28 RX ADMIN — OXYCODONE HYDROCHLORIDE 5 MILLIGRAM(S): 5 TABLET ORAL at 17:45

## 2019-02-28 RX ADMIN — OXYCODONE HYDROCHLORIDE 5 MILLIGRAM(S): 5 TABLET ORAL at 11:23

## 2019-02-28 RX ADMIN — SIMVASTATIN 40 MILLIGRAM(S): 20 TABLET, FILM COATED ORAL at 22:04

## 2019-02-28 RX ADMIN — Medication 100 MILLIGRAM(S): at 17:45

## 2019-02-28 RX ADMIN — SENNA PLUS 2 TABLET(S): 8.6 TABLET ORAL at 22:04

## 2019-02-28 RX ADMIN — Medication 250 MILLIGRAM(S): at 22:04

## 2019-02-28 RX ADMIN — OXYCODONE HYDROCHLORIDE 5 MILLIGRAM(S): 5 TABLET ORAL at 12:23

## 2019-02-28 RX ADMIN — Medication 12.5 MILLIGRAM(S): at 17:46

## 2019-02-28 RX ADMIN — Medication 12.5 MILLIGRAM(S): at 05:08

## 2019-02-28 RX ADMIN — OXYCODONE HYDROCHLORIDE 5 MILLIGRAM(S): 5 TABLET ORAL at 05:09

## 2019-02-28 RX ADMIN — PIPERACILLIN AND TAZOBACTAM 25 GRAM(S): 4; .5 INJECTION, POWDER, LYOPHILIZED, FOR SOLUTION INTRAVENOUS at 05:10

## 2019-02-28 RX ADMIN — PIPERACILLIN AND TAZOBACTAM 25 GRAM(S): 4; .5 INJECTION, POWDER, LYOPHILIZED, FOR SOLUTION INTRAVENOUS at 17:46

## 2019-02-28 RX ADMIN — PIPERACILLIN AND TAZOBACTAM 25 GRAM(S): 4; .5 INJECTION, POWDER, LYOPHILIZED, FOR SOLUTION INTRAVENOUS at 11:12

## 2019-02-28 RX ADMIN — Medication 1 TABLET(S): at 10:23

## 2019-02-28 RX ADMIN — Medication 100 MILLIGRAM(S): at 11:23

## 2019-02-28 NOTE — CONSULT NOTE ADULT - SUBJECTIVE AND OBJECTIVE BOX
VASCULAR SURGERY CONSULT NOTE  --------------------------------------------------------------------------------------------    HPI: 83F with PMH of afib (on Eliquis, currently held), DM, and diabetic neuropathy admitted after a fall with nonhealing LLE wound. She went to the OR for debridement of the wound and a VAC was placed. She has been seen by wound care and plastic surgery for management of the wound with plans for a skin graft. She denies claudication symptoms or smoking history. She is currently on vancomycin and Zosyn for the wound without leukocytosis or fevers.     CT of the LLE w/ IV contrast on admission showed severe atherosclerotic disease in the left popliteal region.      PAST MEDICAL & SURGICAL HISTORY:  Diabetes  Atrial fibrillation, unspecified type  No significant past surgical history    FAMILY HISTORY:  Family history of chronic ischemic heart disease (Father)    ALLERGIES: No Known Allergies    CURRENT MEDICATIONS  MEDICATIONS (STANDING): dextrose 50% Injectable 12.5 Gram(s) IV Push once  dextrose 50% Injectable 25 Gram(s) IV Push once  dextrose 50% Injectable 25 Gram(s) IV Push once  docusate sodium 100 milliGRAM(s) Oral two times a day  enoxaparin Injectable 40 milliGRAM(s) SubCutaneous daily  insulin lispro (HumaLOG) corrective regimen sliding scale   SubCutaneous three times a day before meals  lactobacillus acidophilus 1 Tablet(s) Oral two times a day with meals  metoprolol tartrate 12.5 milliGRAM(s) Oral two times a day  piperacillin/tazobactam IVPB. 3.375 Gram(s) IV Intermittent every 8 hours  pyridoxine 100 milliGRAM(s) Oral daily  senna 2 Tablet(s) Oral at bedtime  simvastatin 40 milliGRAM(s) Oral at bedtime  topiramate 100 milliGRAM(s) Oral two times a day  vancomycin  IVPB 750 milliGRAM(s) IV Intermittent every 12 hours    MEDICATIONS (PRN):acetaminophen   Tablet .. 650 milliGRAM(s) Oral every 6 hours PRN Temp greater or equal to 38C (100.4F), Mild Pain (1 - 3)  dextrose 40% Gel 15 Gram(s) Oral once PRN Blood Glucose LESS THAN 70 milliGRAM(s)/deciLiter  glucagon  Injectable 1 milliGRAM(s) IntraMuscular once PRN Glucose <70 milliGRAM(s)/deciLiter  oxyCODONE    IR 5 milliGRAM(s) Oral every 6 hours PRN moderate to severe pain    --------------------------------------------------------------------------------------------    Vitals:   T(C): 36.8 (02-28-19 @ 05:04), Max: 37.2 (02-27-19 @ 15:28)  HR: 89 (02-28-19 @ 05:04) (74 - 89)  BP: 105/63 (02-28-19 @ 05:04) (96/61 - 105/63)  RR: 18 (02-28-19 @ 05:04) (16 - 18)  SpO2: 100% (02-28-19 @ 05:04) (97% - 100%)  CAPILLARY BLOOD GLUCOSE      POCT Blood Glucose.: 145 mg/dL (28 Feb 2019 12:30)  POCT Blood Glucose.: 127 mg/dL (28 Feb 2019 08:35)  POCT Blood Glucose.: 178 mg/dL (27 Feb 2019 21:21)  POCT Blood Glucose.: 122 mg/dL (27 Feb 2019 17:05)      Height (cm): 162.56 (02-27 @ 06:11)  Weight (kg): 86.2 (02-27 @ 06:11)  BMI (kg/m2): 32.6 (02-27 @ 06:11)  BSA (m2): 1.91 (02-27 @ 06:11)    PHYSICAL EXAM:  General: Alert, NAD  Neuro: A+Ox3  HEENT: NC/AT, no asymmetry, no scleral icterus  Cardio: Adequately perfused  Resp: Airway patent, unlabored breathing  GI/Abd: Soft, NT/ND, no rebound/guarding, no masses palpated  Vascular: All 4 extremities warm, B/l radial pulses palpable, b/l femoral pulse palpable,  b/l DP pulses palpable - right stronger than left, no sensory/motor deficits, wound VAC in place with good seal, moderate edema around wound and slight erythema  Musculoskeletal: All 4 extremities moving spontaneously, no limitations  --------------------------------------------------------------------------------------------    LABS  CBC (02-28 @ 05:21)                              7.4<L>                         3.36<L>  )----------------(  390        --    % Neutrophils, --    % Lymphocytes, ANC: --                                  24.7<L>    BMP (02-28 @ 05:21)             139     |  104     |  11    		Ca++ --      Ca 8.9                ---------------------------------( 113<H>		Mg --                 3.8     |  25      |  0.80  			Ph --          --------------------------------------------------------------------------------------------    MICROBIOLOGY    -> LEG Culture (02-25 @ 00:25)       WBC White Blood Cells  QNTY CELLS IN GRAM STAIN: MODERATE (3+)  NOS^No Organisms Seen      MANY  NG      --------------------------------------------------------------------------------------------    IMAGING    CT Lower Extremity w/ IV Cont, Left (02.24.19 @ 01:43)  FINDINGS:   There is an open skin defect in the anterolateral aspect of the distal   left leg.    There is severe diffuse subcutaneous edema in the in the distal left   thigh, left leg left foot and visualized portions of the medial right   thigh and leg.    There is no discrete abscess or drainable fluid collection.    There is no evidence of necrotizing infection.    There is no gross CT evidence of fasciitis or myositis in the left leg.  There is no periosteal reaction or destructive bony process and the left   tibia or fibula.  There is no suspicious lytic or blastic lesion.    Incidental findings:  There is advanced osteoarthrosis and a suprapatellar joint effusion in   the left knee.    There is heavy atherosclerotic calcification of the arterial vasculature   in the left popliteal region and left leg.    IMPRESSION:   Severe diffuse subcutaneous edema in the visualized portions of both   lower extremities.  Correlate clinically for anasarca versus cellulitis.  No discrete abscess or drainable fluid collection.    No evidence of necrotizing infection.    No CT evidence of advanced osteomyelitis.    Severe peripheral vascular disease.  Claudication should be excluded   clinically.

## 2019-02-28 NOTE — PROGRESS NOTE ADULT - SUBJECTIVE AND OBJECTIVE BOX
Patient is a 83y old  Female who presents with a chief complaint of LLE wound dehiscence/cellulitis (27 Feb 2019 11:59)      INTERVAL HPI/OVERNIGHT EVENTS: seen by Wound care     MEDICATIONS  (STANDING):  dextrose 50% Injectable 12.5 Gram(s) IV Push once  dextrose 50% Injectable 25 Gram(s) IV Push once  dextrose 50% Injectable 25 Gram(s) IV Push once  docusate sodium 100 milliGRAM(s) Oral two times a day  enoxaparin Injectable 40 milliGRAM(s) SubCutaneous daily  insulin lispro (HumaLOG) corrective regimen sliding scale   SubCutaneous three times a day before meals  lactobacillus acidophilus 1 Tablet(s) Oral two times a day with meals  metoprolol tartrate 12.5 milliGRAM(s) Oral two times a day  piperacillin/tazobactam IVPB. 3.375 Gram(s) IV Intermittent every 8 hours  pyridoxine 100 milliGRAM(s) Oral daily  senna 2 Tablet(s) Oral at bedtime  simvastatin 40 milliGRAM(s) Oral at bedtime  topiramate 100 milliGRAM(s) Oral two times a day  vancomycin  IVPB 750 milliGRAM(s) IV Intermittent every 12 hours    MEDICATIONS  (PRN):  acetaminophen   Tablet .. 650 milliGRAM(s) Oral every 6 hours PRN Temp greater or equal to 38C (100.4F), Mild Pain (1 - 3)  dextrose 40% Gel 15 Gram(s) Oral once PRN Blood Glucose LESS THAN 70 milliGRAM(s)/deciLiter  glucagon  Injectable 1 milliGRAM(s) IntraMuscular once PRN Glucose <70 milliGRAM(s)/deciLiter  oxyCODONE    IR 5 milliGRAM(s) Oral every 6 hours PRN moderate to severe pain        Orders last 24 hours:  senna:   2 Tablet(s), Oral, at bedtime  Provider's Contact #: [phone] (02-27-19 @ 08:58)  POCT  Blood Glucose (02-27-19 @ 12:26)  POCT  Blood Glucose (02-27-19 @ 17:06)  POCT  Blood Glucose (02-27-19 @ 21:22)  POCT  Blood Glucose (02-27-19 @ 21:22)  POCT  Blood Glucose (02-28-19 @ 08:37)      Allergies    No Known Allergies    Intolerances        REVIEW OF SYSTEMS:  CARDIOVASCULAR: No chest pain, palpitations, dizziness, or leg swelling; no shortness of breath     RESPIRATORY: No cough, wheezing, chills or hemoptysis; No shortness of breath    GASTROINTESTINAL: No abdominal or epigastric pain. No nausea, vomiting, or hematemesis; No diarrhea or constipation. No melena or hematochezia.    NEUROLOGICAL: No headaches, memory loss, loss of strength, numbness      PHYSICAL EXAM:  Vital Signs Last 24 Hrs  T(C): 36.8 (28 Feb 2019 05:04), Max: 37.2 (27 Feb 2019 15:28)  T(F): 98.2 (28 Feb 2019 05:04), Max: 98.9 (27 Feb 2019 15:28)  HR: 89 (28 Feb 2019 05:04) (74 - 89)  BP: 105/63 (28 Feb 2019 05:04) (96/61 - 105/63)  BP(mean): --  RR: 18 (28 Feb 2019 05:04) (16 - 18)  SpO2: 100% (28 Feb 2019 05:04) (97% - 100%)    GENERAL: NAD, well-groomed, well-developed  HEAD:  Atraumatic, Normocephalic  EYES: EOMI, PERRLA, conjunctiva and sclera clear  NECK: Supple, No JVD, Normal thyroid  NERVOUS SYSTEM:  Alert & Oriented X3, Good concentration;  and symmetric  CHEST/LUNG: Clear to auscultation bilaterally; No rales, rhonchi, wheezing, or rubs  HEART: S1S2 irregularly irregular, with II/VI systolic murmur left sternal border  ABDOMEN: Soft, Nontender, Nondistended; Bowel sounds present        LABS:                        7.4    3.36  )-----------( 390      ( 28 Feb 2019 05:21 )             24.7     28 Feb 2019 05:21    139    |  104    |  11     ----------------------------<  113    3.8     |  25     |  0.80     Ca    8.9        28 Feb 2019 05:21        CAPILLARY BLOOD GLUCOSE      POCT Blood Glucose.: 127 mg/dL (28 Feb 2019 08:35)  POCT Blood Glucose.: 178 mg/dL (27 Feb 2019 21:21)  POCT Blood Glucose.: 122 mg/dL (27 Feb 2019 17:05)  POCT Blood Glucose.: 148 mg/dL (27 Feb 2019 12:25)      TELEMETRY:    RADIOLOGY & ADDITIONAL TESTS:    Imaging Personally Reviewed:  [ ] YES     Consultant(s) Notes Reviewed:  Dr Cordova       assessment:  pod 4.   afib, better controlled.   DM controlled  Anemia     Plan:   follow cbc.   Vascular eval (Dr. Caceres, called)   Surgery f/u- ? Duration of antibiotics.  Plastic surgery f/u

## 2019-02-28 NOTE — CONSULT NOTE ADULT - EXTREMITIES COMMENTS
mild art insuff w mild trophic skin changes  mild venous insuff  w mild st dermatitis  vac in place left lateral shin wound 9cm x 23cm

## 2019-03-01 DIAGNOSIS — S81.802S UNSPECIFIED OPEN WOUND, LEFT LOWER LEG, SEQUELA: ICD-10-CM

## 2019-03-01 DIAGNOSIS — I73.9 PERIPHERAL VASCULAR DISEASE, UNSPECIFIED: ICD-10-CM

## 2019-03-01 LAB
ANION GAP SERPL CALC-SCNC: 7 MMO/L — SIGNIFICANT CHANGE UP (ref 7–14)
BUN SERPL-MCNC: 9 MG/DL — SIGNIFICANT CHANGE UP (ref 7–23)
CALCIUM SERPL-MCNC: 9 MG/DL — SIGNIFICANT CHANGE UP (ref 8.4–10.5)
CHLORIDE SERPL-SCNC: 105 MMOL/L — SIGNIFICANT CHANGE UP (ref 98–107)
CO2 SERPL-SCNC: 24 MMOL/L — SIGNIFICANT CHANGE UP (ref 22–31)
CREAT SERPL-MCNC: 0.76 MG/DL — SIGNIFICANT CHANGE UP (ref 0.5–1.3)
GLUCOSE BLDC GLUCOMTR-MCNC: 130 MG/DL — HIGH (ref 70–99)
GLUCOSE BLDC GLUCOMTR-MCNC: 136 MG/DL — HIGH (ref 70–99)
GLUCOSE BLDC GLUCOMTR-MCNC: 142 MG/DL — HIGH (ref 70–99)
GLUCOSE BLDC GLUCOMTR-MCNC: 175 MG/DL — HIGH (ref 70–99)
GLUCOSE SERPL-MCNC: 114 MG/DL — HIGH (ref 70–99)
HCT VFR BLD CALC: 26.6 % — LOW (ref 34.5–45)
HGB BLD-MCNC: 8 G/DL — LOW (ref 11.5–15.5)
MCHC RBC-ENTMCNC: 30.1 % — LOW (ref 32–36)
MCHC RBC-ENTMCNC: 30.1 PG — SIGNIFICANT CHANGE UP (ref 27–34)
MCV RBC AUTO: 100 FL — SIGNIFICANT CHANGE UP (ref 80–100)
NRBC # FLD: 0 K/UL — LOW (ref 25–125)
PLATELET # BLD AUTO: 428 K/UL — HIGH (ref 150–400)
PMV BLD: 9.2 FL — SIGNIFICANT CHANGE UP (ref 7–13)
POTASSIUM SERPL-MCNC: 3.9 MMOL/L — SIGNIFICANT CHANGE UP (ref 3.5–5.3)
POTASSIUM SERPL-SCNC: 3.9 MMOL/L — SIGNIFICANT CHANGE UP (ref 3.5–5.3)
RBC # BLD: 2.66 M/UL — LOW (ref 3.8–5.2)
RBC # FLD: 21 % — HIGH (ref 10.3–14.5)
SODIUM SERPL-SCNC: 136 MMOL/L — SIGNIFICANT CHANGE UP (ref 135–145)
VANCOMYCIN FLD-MCNC: 15.1 UG/ML — SIGNIFICANT CHANGE UP
WBC # BLD: 3.61 K/UL — LOW (ref 3.8–10.5)
WBC # FLD AUTO: 3.61 K/UL — LOW (ref 3.8–10.5)

## 2019-03-01 PROCEDURE — 99232 SBSQ HOSP IP/OBS MODERATE 35: CPT

## 2019-03-01 RX ORDER — CHLORHEXIDINE GLUCONATE 213 G/1000ML
1 SOLUTION TOPICAL EVERY 12 HOURS
Qty: 0 | Refills: 0 | Status: DISCONTINUED | OUTPATIENT
Start: 2019-03-01 | End: 2019-03-11

## 2019-03-01 RX ORDER — APIXABAN 2.5 MG/1
5 TABLET, FILM COATED ORAL EVERY 12 HOURS
Qty: 0 | Refills: 0 | Status: DISCONTINUED | OUTPATIENT
Start: 2019-03-01 | End: 2019-03-11

## 2019-03-01 RX ORDER — CILOSTAZOL 100 MG/1
50 TABLET ORAL
Qty: 0 | Refills: 0 | Status: DISCONTINUED | OUTPATIENT
Start: 2019-03-01 | End: 2019-03-11

## 2019-03-01 RX ADMIN — PIPERACILLIN AND TAZOBACTAM 25 GRAM(S): 4; .5 INJECTION, POWDER, LYOPHILIZED, FOR SOLUTION INTRAVENOUS at 01:26

## 2019-03-01 RX ADMIN — CHLORHEXIDINE GLUCONATE 1 APPLICATION(S): 213 SOLUTION TOPICAL at 18:00

## 2019-03-01 RX ADMIN — PIPERACILLIN AND TAZOBACTAM 25 GRAM(S): 4; .5 INJECTION, POWDER, LYOPHILIZED, FOR SOLUTION INTRAVENOUS at 13:02

## 2019-03-01 RX ADMIN — Medication 1 TABLET(S): at 11:01

## 2019-03-01 RX ADMIN — OXYCODONE HYDROCHLORIDE 5 MILLIGRAM(S): 5 TABLET ORAL at 05:51

## 2019-03-01 RX ADMIN — OXYCODONE HYDROCHLORIDE 5 MILLIGRAM(S): 5 TABLET ORAL at 00:48

## 2019-03-01 RX ADMIN — OXYCODONE HYDROCHLORIDE 5 MILLIGRAM(S): 5 TABLET ORAL at 18:20

## 2019-03-01 RX ADMIN — Medication 100 MILLIGRAM(S): at 18:04

## 2019-03-01 RX ADMIN — Medication 250 MILLIGRAM(S): at 11:02

## 2019-03-01 RX ADMIN — Medication 1 TABLET(S): at 18:03

## 2019-03-01 RX ADMIN — SIMVASTATIN 40 MILLIGRAM(S): 20 TABLET, FILM COATED ORAL at 21:58

## 2019-03-01 RX ADMIN — CILOSTAZOL 50 MILLIGRAM(S): 100 TABLET ORAL at 18:03

## 2019-03-01 RX ADMIN — SENNA PLUS 2 TABLET(S): 8.6 TABLET ORAL at 21:58

## 2019-03-01 RX ADMIN — Medication 12.5 MILLIGRAM(S): at 05:40

## 2019-03-01 RX ADMIN — CHLORHEXIDINE GLUCONATE 1 APPLICATION(S): 213 SOLUTION TOPICAL at 05:42

## 2019-03-01 RX ADMIN — OXYCODONE HYDROCHLORIDE 5 MILLIGRAM(S): 5 TABLET ORAL at 17:40

## 2019-03-01 RX ADMIN — Medication 250 MILLIGRAM(S): at 22:30

## 2019-03-01 RX ADMIN — Medication 100 MILLIGRAM(S): at 05:39

## 2019-03-01 RX ADMIN — Medication 100 MILLIGRAM(S): at 13:03

## 2019-03-01 RX ADMIN — PIPERACILLIN AND TAZOBACTAM 25 GRAM(S): 4; .5 INJECTION, POWDER, LYOPHILIZED, FOR SOLUTION INTRAVENOUS at 21:58

## 2019-03-01 RX ADMIN — OXYCODONE HYDROCHLORIDE 5 MILLIGRAM(S): 5 TABLET ORAL at 23:58

## 2019-03-01 RX ADMIN — Medication 12.5 MILLIGRAM(S): at 18:03

## 2019-03-01 RX ADMIN — OXYCODONE HYDROCHLORIDE 5 MILLIGRAM(S): 5 TABLET ORAL at 06:51

## 2019-03-01 RX ADMIN — APIXABAN 5 MILLIGRAM(S): 2.5 TABLET, FILM COATED ORAL at 18:03

## 2019-03-01 RX ADMIN — Medication 100 MILLIGRAM(S): at 18:03

## 2019-03-01 NOTE — PROGRESS NOTE ADULT - SUBJECTIVE AND OBJECTIVE BOX
Patient is a 83y old  Female who presents with a chief complaint of LLE wound dehiscence/cellulitis (01 Mar 2019 09:00)        MEDICATIONS  (STANDING):  chlorhexidine 4% Liquid 1 Application(s) Topical every 12 hours  dextrose 50% Injectable 12.5 Gram(s) IV Push once  dextrose 50% Injectable 25 Gram(s) IV Push once  dextrose 50% Injectable 25 Gram(s) IV Push once  docusate sodium 100 milliGRAM(s) Oral two times a day  enoxaparin Injectable 40 milliGRAM(s) SubCutaneous daily  insulin lispro (HumaLOG) corrective regimen sliding scale   SubCutaneous three times a day before meals  lactobacillus acidophilus 1 Tablet(s) Oral two times a day with meals  metoprolol tartrate 12.5 milliGRAM(s) Oral two times a day  piperacillin/tazobactam IVPB. 3.375 Gram(s) IV Intermittent every 8 hours  pyridoxine 100 milliGRAM(s) Oral daily  senna 2 Tablet(s) Oral at bedtime  simvastatin 40 milliGRAM(s) Oral at bedtime  topiramate 100 milliGRAM(s) Oral two times a day  vancomycin  IVPB 750 milliGRAM(s) IV Intermittent every 12 hours    MEDICATIONS  (PRN):  acetaminophen   Tablet .. 650 milliGRAM(s) Oral every 6 hours PRN Temp greater or equal to 38C (100.4F), Mild Pain (1 - 3)  dextrose 40% Gel 15 Gram(s) Oral once PRN Blood Glucose LESS THAN 70 milliGRAM(s)/deciLiter  glucagon  Injectable 1 milliGRAM(s) IntraMuscular once PRN Glucose <70 milliGRAM(s)/deciLiter  oxyCODONE    IR 5 milliGRAM(s) Oral every 6 hours PRN moderate to severe pain    )      Allergies    No Known Allergies    Intolerances        REVIEW OF SYSTEMS:  CARDIOVASCULAR: No chest pain, palpitations, dizziness, or leg swelling; no shortness of breath     RESPIRATORY: No cough, wheezing, chills or hemoptysis; No shortness of breath    GASTROINTESTINAL: No abdominal or epigastric pain. No nausea, vomiting, or hematemesis; No diarrhea or constipation. No melena or hematochezia.    NEUROLOGICAL: No headaches, memory loss, loss of strength, numbness      PHYSICAL EXAM:  Vital Signs Last 24 Hrs  T(C): 36.7 (01 Mar 2019 05:37), Max: 37.1 (28 Feb 2019 14:02)  T(F): 98 (01 Mar 2019 05:37), Max: 98.8 (28 Feb 2019 14:02)  HR: 88 (01 Mar 2019 05:37) (63 - 88)  BP: 110/70 (01 Mar 2019 05:37) (101/59 - 112/72)  BP(mean): --  RR: 18 (01 Mar 2019 05:37) (16 - 18)  SpO2: 99% (01 Mar 2019 05:37) (95% - 100%)    GENERAL: NAD, well-groomed, well-developed  HEAD:  Atraumatic, Normocephalic  EYES: EOMI, PERRLA, conjunctiva and sclera clear  NECK: Supple, No JVD, Normal thyroid  NERVOUS SYSTEM:  Alert & Oriented X3, Good concentration;  and symmetric  CHEST/LUNG: Clear to auscultation bilaterally; No rales, rhonchi, wheezing, or rubs  HEART: S1S2 irregularly irregular, with II/VI systolic murmur left lower sternal border, without , rub nor gallop  ABDOMEN: Soft, Nontender, Nondistended; Bowel sounds present        LABS:                        8.5    3.77  )-----------( 445      ( 28 Feb 2019 16:27 )             28.6     01 Mar 2019 05:47    136    |  105    |  9      ----------------------------<  114    3.9     |  24     |  0.76     Ca    9.0        01 Mar 2019 05:47        CAPILLARY BLOOD GLUCOSE      POCT Blood Glucose.: 136 mg/dL (01 Mar 2019 08:39)  POCT Blood Glucose.: 147 mg/dL (28 Feb 2019 22:53)  POCT Blood Glucose.: 120 mg/dL (28 Feb 2019 17:42)  POCT Blood Glucose.: 145 mg/dL (28 Feb 2019 12:30)          assessment:  POD 5    Plan:   per surgery/ plastics.   Vac change today.   Start Pletal and resume Elequis     Care Discussed with Consultants/Other Providers: Pt's daughter, Dr. Perry and Dr. Caceres

## 2019-03-01 NOTE — PROGRESS NOTE ADULT - SUBJECTIVE AND OBJECTIVE BOX
s/p LLE wide debridement of dehisced wound with superimposed infection/cellulitis  -VAC therapy to LLE extremity, back ulceration treated by the wound care team with medihoney  -[facility]/plastics input   -wound healing and eventual closure/coverage will be a chronic process, wound care following   -No addition debridement needed currently - will assess wound with VAC team  -would consider narrowing/stopping antibiotics in the face of adequate source control     Mejia Perry MD  Acute Care Surgery   [phone]

## 2019-03-01 NOTE — PROGRESS NOTE ADULT - SUBJECTIVE AND OBJECTIVE BOX
Patient is a 83y old  Female who presents with a chief complaint of LLE wound dehiscence/cellulitis (01 Mar 2019 09:14)      Vascular Surgery Attending Progress Note    Interval HPI: pt w/o c/o     Medications:  acetaminophen   Tablet .. 650 milliGRAM(s) Oral every 6 hours PRN  apixaban 5 milliGRAM(s) Oral every 12 hours  chlorhexidine 4% Liquid 1 Application(s) Topical every 12 hours  cilostazol 50 milliGRAM(s) Oral two times a day  dextrose 40% Gel 15 Gram(s) Oral once PRN  dextrose 50% Injectable 12.5 Gram(s) IV Push once  dextrose 50% Injectable 25 Gram(s) IV Push once  dextrose 50% Injectable 25 Gram(s) IV Push once  docusate sodium 100 milliGRAM(s) Oral two times a day  glucagon  Injectable 1 milliGRAM(s) IntraMuscular once PRN  insulin lispro (HumaLOG) corrective regimen sliding scale   SubCutaneous three times a day before meals  lactobacillus acidophilus 1 Tablet(s) Oral two times a day with meals  metoprolol tartrate 12.5 milliGRAM(s) Oral two times a day  oxyCODONE    IR 5 milliGRAM(s) Oral every 6 hours PRN  piperacillin/tazobactam IVPB. 3.375 Gram(s) IV Intermittent every 8 hours  pyridoxine 100 milliGRAM(s) Oral daily  senna 2 Tablet(s) Oral at bedtime  simvastatin 40 milliGRAM(s) Oral at bedtime  topiramate 100 milliGRAM(s) Oral two times a day  vancomycin  IVPB 750 milliGRAM(s) IV Intermittent every 12 hours      Vital Signs Last 24 Hrs  T(C): 36.4 (01 Mar 2019 15:20), Max: 36.8 (28 Feb 2019 22:05)  T(F): 97.6 (01 Mar 2019 15:20), Max: 98.3 (28 Feb 2019 22:05)  HR: 90 (01 Mar 2019 18:05) (84 - 90)  BP: 103/61 (01 Mar 2019 18:05) (98/49 - 110/70)  BP(mean): --  RR: 18 (01 Mar 2019 15:20) (16 - 18)  SpO2: 100% (01 Mar 2019 15:20) (99% - 100%)  I&O's Summary      Physical Exam:  Neuro  A&Ox3 VSS  Vascular:  wound stable w vac     LABS:                        8.0    3.61  )-----------( 428      ( 01 Mar 2019 05:47 )             26.6     03-01    136  |  105  |  9   ----------------------------<  114<H>  3.9   |  24  |  0.76    Ca    9.0      01 Mar 2019 05:47      PARVIN/PVR reviewed     STORM BRYANT MD  [phone]

## 2019-03-01 NOTE — PROGRESS NOTE ADULT - ASSESSMENT
83F with PMH of afib (on Eliquis, currently held), DM, and diabetic neuropathy admitted after a fall with nonhealing LLE wound, now with wound VAC and plan for possible skin graft with PRS. CT on admission shows severe atherosclerotic disease in the left popliteal region.    - No vascular surgery intervention at this time  - agree w pletal  rx  - Continue local wound care per wound care team  -

## 2019-03-02 LAB
ANION GAP SERPL CALC-SCNC: 10 MMO/L — SIGNIFICANT CHANGE UP (ref 7–14)
BUN SERPL-MCNC: 11 MG/DL — SIGNIFICANT CHANGE UP (ref 7–23)
CALCIUM SERPL-MCNC: 9.1 MG/DL — SIGNIFICANT CHANGE UP (ref 8.4–10.5)
CHLORIDE SERPL-SCNC: 104 MMOL/L — SIGNIFICANT CHANGE UP (ref 98–107)
CO2 SERPL-SCNC: 24 MMOL/L — SIGNIFICANT CHANGE UP (ref 22–31)
CREAT SERPL-MCNC: 0.76 MG/DL — SIGNIFICANT CHANGE UP (ref 0.5–1.3)
GLUCOSE BLDC GLUCOMTR-MCNC: 112 MG/DL — HIGH (ref 70–99)
GLUCOSE BLDC GLUCOMTR-MCNC: 126 MG/DL — HIGH (ref 70–99)
GLUCOSE BLDC GLUCOMTR-MCNC: 127 MG/DL — HIGH (ref 70–99)
GLUCOSE BLDC GLUCOMTR-MCNC: 174 MG/DL — HIGH (ref 70–99)
GLUCOSE SERPL-MCNC: 111 MG/DL — HIGH (ref 70–99)
HCT VFR BLD CALC: 28.7 % — LOW (ref 34.5–45)
HGB BLD-MCNC: 8.5 G/DL — LOW (ref 11.5–15.5)
MCHC RBC-ENTMCNC: 29.5 PG — SIGNIFICANT CHANGE UP (ref 27–34)
MCHC RBC-ENTMCNC: 29.6 % — LOW (ref 32–36)
MCV RBC AUTO: 99.7 FL — SIGNIFICANT CHANGE UP (ref 80–100)
NRBC # FLD: 0 K/UL — LOW (ref 25–125)
PLATELET # BLD AUTO: 478 K/UL — HIGH (ref 150–400)
PMV BLD: 9.3 FL — SIGNIFICANT CHANGE UP (ref 7–13)
POTASSIUM SERPL-MCNC: 3.9 MMOL/L — SIGNIFICANT CHANGE UP (ref 3.5–5.3)
POTASSIUM SERPL-SCNC: 3.9 MMOL/L — SIGNIFICANT CHANGE UP (ref 3.5–5.3)
RBC # BLD: 2.88 M/UL — LOW (ref 3.8–5.2)
RBC # FLD: 20.4 % — HIGH (ref 10.3–14.5)
SODIUM SERPL-SCNC: 138 MMOL/L — SIGNIFICANT CHANGE UP (ref 135–145)
VANCOMYCIN TROUGH SERPL-MCNC: 14.2 UG/ML — SIGNIFICANT CHANGE UP (ref 10–20)
WBC # BLD: 4.1 K/UL — SIGNIFICANT CHANGE UP (ref 3.8–10.5)
WBC # FLD AUTO: 4.1 K/UL — SIGNIFICANT CHANGE UP (ref 3.8–10.5)

## 2019-03-02 RX ADMIN — CILOSTAZOL 50 MILLIGRAM(S): 100 TABLET ORAL at 18:47

## 2019-03-02 RX ADMIN — CHLORHEXIDINE GLUCONATE 1 APPLICATION(S): 213 SOLUTION TOPICAL at 06:31

## 2019-03-02 RX ADMIN — Medication 250 MILLIGRAM(S): at 10:58

## 2019-03-02 RX ADMIN — Medication 250 MILLIGRAM(S): at 21:20

## 2019-03-02 RX ADMIN — Medication 1 TABLET(S): at 18:49

## 2019-03-02 RX ADMIN — SENNA PLUS 2 TABLET(S): 8.6 TABLET ORAL at 21:53

## 2019-03-02 RX ADMIN — Medication 100 MILLIGRAM(S): at 06:29

## 2019-03-02 RX ADMIN — OXYCODONE HYDROCHLORIDE 5 MILLIGRAM(S): 5 TABLET ORAL at 13:07

## 2019-03-02 RX ADMIN — APIXABAN 5 MILLIGRAM(S): 2.5 TABLET, FILM COATED ORAL at 18:47

## 2019-03-02 RX ADMIN — OXYCODONE HYDROCHLORIDE 5 MILLIGRAM(S): 5 TABLET ORAL at 07:25

## 2019-03-02 RX ADMIN — OXYCODONE HYDROCHLORIDE 5 MILLIGRAM(S): 5 TABLET ORAL at 00:13

## 2019-03-02 RX ADMIN — SIMVASTATIN 40 MILLIGRAM(S): 20 TABLET, FILM COATED ORAL at 21:53

## 2019-03-02 RX ADMIN — Medication 100 MILLIGRAM(S): at 18:48

## 2019-03-02 RX ADMIN — PIPERACILLIN AND TAZOBACTAM 25 GRAM(S): 4; .5 INJECTION, POWDER, LYOPHILIZED, FOR SOLUTION INTRAVENOUS at 21:53

## 2019-03-02 RX ADMIN — CHLORHEXIDINE GLUCONATE 1 APPLICATION(S): 213 SOLUTION TOPICAL at 18:48

## 2019-03-02 RX ADMIN — OXYCODONE HYDROCHLORIDE 5 MILLIGRAM(S): 5 TABLET ORAL at 22:55

## 2019-03-02 RX ADMIN — Medication 1 TABLET(S): at 09:23

## 2019-03-02 RX ADMIN — PIPERACILLIN AND TAZOBACTAM 25 GRAM(S): 4; .5 INJECTION, POWDER, LYOPHILIZED, FOR SOLUTION INTRAVENOUS at 13:07

## 2019-03-02 RX ADMIN — Medication 12.5 MILLIGRAM(S): at 06:29

## 2019-03-02 RX ADMIN — OXYCODONE HYDROCHLORIDE 5 MILLIGRAM(S): 5 TABLET ORAL at 21:59

## 2019-03-02 RX ADMIN — Medication 12.5 MILLIGRAM(S): at 18:48

## 2019-03-02 RX ADMIN — PIPERACILLIN AND TAZOBACTAM 25 GRAM(S): 4; .5 INJECTION, POWDER, LYOPHILIZED, FOR SOLUTION INTRAVENOUS at 05:28

## 2019-03-02 RX ADMIN — APIXABAN 5 MILLIGRAM(S): 2.5 TABLET, FILM COATED ORAL at 06:29

## 2019-03-02 RX ADMIN — CILOSTAZOL 50 MILLIGRAM(S): 100 TABLET ORAL at 06:29

## 2019-03-02 RX ADMIN — OXYCODONE HYDROCHLORIDE 5 MILLIGRAM(S): 5 TABLET ORAL at 06:29

## 2019-03-02 RX ADMIN — OXYCODONE HYDROCHLORIDE 5 MILLIGRAM(S): 5 TABLET ORAL at 14:07

## 2019-03-02 RX ADMIN — Medication 100 MILLIGRAM(S): at 13:07

## 2019-03-02 NOTE — PROGRESS NOTE ADULT - SUBJECTIVE AND OBJECTIVE BOX
Patient is a 83y old  Female who presents with a chief complaint of LLE wound dehiscence/cellulitis (01 Mar 2019 19:01)      INTERVAL HPI/OVERNIGHT EVENTS:    MEDICATIONS  (STANDING):  apixaban 5 milliGRAM(s) Oral every 12 hours  chlorhexidine 4% Liquid 1 Application(s) Topical every 12 hours  cilostazol 50 milliGRAM(s) Oral two times a day  dextrose 50% Injectable 12.5 Gram(s) IV Push once  dextrose 50% Injectable 25 Gram(s) IV Push once  dextrose 50% Injectable 25 Gram(s) IV Push once  docusate sodium 100 milliGRAM(s) Oral two times a day  insulin lispro (HumaLOG) corrective regimen sliding scale   SubCutaneous three times a day before meals  lactobacillus acidophilus 1 Tablet(s) Oral two times a day with meals  metoprolol tartrate 12.5 milliGRAM(s) Oral two times a day  piperacillin/tazobactam IVPB. 3.375 Gram(s) IV Intermittent every 8 hours  pyridoxine 100 milliGRAM(s) Oral daily  senna 2 Tablet(s) Oral at bedtime  simvastatin 40 milliGRAM(s) Oral at bedtime  topiramate 100 milliGRAM(s) Oral two times a day  vancomycin  IVPB 750 milliGRAM(s) IV Intermittent every 12 hours    MEDICATIONS  (PRN):  acetaminophen   Tablet .. 650 milliGRAM(s) Oral every 6 hours PRN Temp greater or equal to 38C (100.4F), Mild Pain (1 - 3)  dextrose 40% Gel 15 Gram(s) Oral once PRN Blood Glucose LESS THAN 70 milliGRAM(s)/deciLiter  glucagon  Injectable 1 milliGRAM(s) IntraMuscular once PRN Glucose <70 milliGRAM(s)/deciLiter  oxyCODONE    IR 5 milliGRAM(s) Oral every 6 hours PRN moderate to severe pain              Allergies    No Known Allergies    Intolerances        REVIEW OF SYSTEMS:  CARDIOVASCULAR: No chest pain, palpitations, dizziness, or leg swelling; no shortness of breath     RESPIRATORY: No cough, wheezing, chills or hemoptysis; No shortness of breath    GASTROINTESTINAL: No abdominal or epigastric pain. No nausea, vomiting, or hematemesis; No diarrhea or constipation. No melena or hematochezia.    NEUROLOGICAL: No headaches, memory loss, loss of strength, numbness      PHYSICAL EXAM:  Vital Signs Last 24 Hrs  T(C): 36.6 (02 Mar 2019 21:52), Max: 36.7 (02 Mar 2019 05:49)  T(F): 97.9 (02 Mar 2019 21:52), Max: 98 (02 Mar 2019 05:49)  HR: 88 (02 Mar 2019 21:52) (76 - 88)  BP: 112/72 (02 Mar 2019 21:52) (100/71 - 112/72)  BP(mean): --  RR: 17 (02 Mar 2019 21:52) (17 - 18)  SpO2: 100% (02 Mar 2019 21:52) (100% - 100%)    GENERAL: NAD, well-groomed, well-developed  HEAD:  Atraumatic, Normocephalic  EYES: EOMI, PERRLA, conjunctiva and sclera clear  NECK: Supple, No JVD, Normal thyroid  NERVOUS SYSTEM:  Alert & Oriented X3, Good concentration;  and symmetric  CHEST/LUNG: Clear to auscultation bilaterally; No rales, rhonchi, wheezing, or rubs  HEART: S1S2 irregularly irregular, with II/VI systolic murmur left sternal border, without rub nor gallop  ABDOMEN: Soft, Nontender, Nondistended; Bowel sounds present        LABS:                        8.5    4.10  )-----------( 478      ( 02 Mar 2019 06:54 )             28.7     02 Mar 2019 06:54    138    |  104    |  11     ----------------------------<  111    3.9     |  24     |  0.76     Ca    9.1        02 Mar 2019 06:54        CAPILLARY BLOOD GLUCOSE      POCT Blood Glucose.: 174 mg/dL (02 Mar 2019 22:45)  POCT Blood Glucose.: 126 mg/dL (02 Mar 2019 17:32)  POCT Blood Glucose.: 127 mg/dL (02 Mar 2019 12:43)  POCT Blood Glucose.: 112 mg/dL (02 Mar 2019 08:54)            assessment:  POD 6.     Plan:   awaiting echo.   Post op/wound care .  Continue antibiotics.  Wound vac changed yesterday

## 2019-03-03 LAB
ANION GAP SERPL CALC-SCNC: 10 MMO/L — SIGNIFICANT CHANGE UP (ref 7–14)
BUN SERPL-MCNC: 11 MG/DL — SIGNIFICANT CHANGE UP (ref 7–23)
CALCIUM SERPL-MCNC: 8.8 MG/DL — SIGNIFICANT CHANGE UP (ref 8.4–10.5)
CHLORIDE SERPL-SCNC: 106 MMOL/L — SIGNIFICANT CHANGE UP (ref 98–107)
CO2 SERPL-SCNC: 23 MMOL/L — SIGNIFICANT CHANGE UP (ref 22–31)
CREAT SERPL-MCNC: 0.74 MG/DL — SIGNIFICANT CHANGE UP (ref 0.5–1.3)
GLUCOSE BLDC GLUCOMTR-MCNC: 126 MG/DL — HIGH (ref 70–99)
GLUCOSE BLDC GLUCOMTR-MCNC: 143 MG/DL — HIGH (ref 70–99)
GLUCOSE BLDC GLUCOMTR-MCNC: 159 MG/DL — HIGH (ref 70–99)
GLUCOSE BLDC GLUCOMTR-MCNC: 188 MG/DL — HIGH (ref 70–99)
GLUCOSE SERPL-MCNC: 140 MG/DL — HIGH (ref 70–99)
HCT VFR BLD CALC: 25.6 % — LOW (ref 34.5–45)
HGB BLD-MCNC: 7.5 G/DL — LOW (ref 11.5–15.5)
MAGNESIUM SERPL-MCNC: 1.9 MG/DL — SIGNIFICANT CHANGE UP (ref 1.6–2.6)
MCHC RBC-ENTMCNC: 29.2 PG — SIGNIFICANT CHANGE UP (ref 27–34)
MCHC RBC-ENTMCNC: 29.3 % — LOW (ref 32–36)
MCV RBC AUTO: 99.6 FL — SIGNIFICANT CHANGE UP (ref 80–100)
NRBC # FLD: 0 K/UL — LOW (ref 25–125)
PLATELET # BLD AUTO: 437 K/UL — HIGH (ref 150–400)
PMV BLD: 9.2 FL — SIGNIFICANT CHANGE UP (ref 7–13)
POTASSIUM SERPL-MCNC: 3.7 MMOL/L — SIGNIFICANT CHANGE UP (ref 3.5–5.3)
POTASSIUM SERPL-SCNC: 3.7 MMOL/L — SIGNIFICANT CHANGE UP (ref 3.5–5.3)
RBC # BLD: 2.57 M/UL — LOW (ref 3.8–5.2)
RBC # FLD: 20.4 % — HIGH (ref 10.3–14.5)
SODIUM SERPL-SCNC: 139 MMOL/L — SIGNIFICANT CHANGE UP (ref 135–145)
WBC # BLD: 3.48 K/UL — LOW (ref 3.8–10.5)
WBC # FLD AUTO: 3.48 K/UL — LOW (ref 3.8–10.5)

## 2019-03-03 RX ADMIN — Medication 1 TABLET(S): at 08:55

## 2019-03-03 RX ADMIN — Medication 1: at 12:01

## 2019-03-03 RX ADMIN — APIXABAN 5 MILLIGRAM(S): 2.5 TABLET, FILM COATED ORAL at 05:27

## 2019-03-03 RX ADMIN — PIPERACILLIN AND TAZOBACTAM 25 GRAM(S): 4; .5 INJECTION, POWDER, LYOPHILIZED, FOR SOLUTION INTRAVENOUS at 05:28

## 2019-03-03 RX ADMIN — PIPERACILLIN AND TAZOBACTAM 25 GRAM(S): 4; .5 INJECTION, POWDER, LYOPHILIZED, FOR SOLUTION INTRAVENOUS at 12:00

## 2019-03-03 RX ADMIN — Medication 1 TABLET(S): at 17:36

## 2019-03-03 RX ADMIN — APIXABAN 5 MILLIGRAM(S): 2.5 TABLET, FILM COATED ORAL at 17:37

## 2019-03-03 RX ADMIN — SENNA PLUS 2 TABLET(S): 8.6 TABLET ORAL at 21:51

## 2019-03-03 RX ADMIN — Medication 12.5 MILLIGRAM(S): at 17:37

## 2019-03-03 RX ADMIN — CHLORHEXIDINE GLUCONATE 1 APPLICATION(S): 213 SOLUTION TOPICAL at 17:38

## 2019-03-03 RX ADMIN — OXYCODONE HYDROCHLORIDE 5 MILLIGRAM(S): 5 TABLET ORAL at 18:35

## 2019-03-03 RX ADMIN — Medication 12.5 MILLIGRAM(S): at 05:28

## 2019-03-03 RX ADMIN — SIMVASTATIN 40 MILLIGRAM(S): 20 TABLET, FILM COATED ORAL at 21:51

## 2019-03-03 RX ADMIN — CHLORHEXIDINE GLUCONATE 1 APPLICATION(S): 213 SOLUTION TOPICAL at 05:28

## 2019-03-03 RX ADMIN — Medication 100 MILLIGRAM(S): at 12:00

## 2019-03-03 RX ADMIN — Medication 100 MILLIGRAM(S): at 17:37

## 2019-03-03 RX ADMIN — Medication 250 MILLIGRAM(S): at 20:26

## 2019-03-03 RX ADMIN — Medication 100 MILLIGRAM(S): at 05:27

## 2019-03-03 RX ADMIN — OXYCODONE HYDROCHLORIDE 5 MILLIGRAM(S): 5 TABLET ORAL at 23:54

## 2019-03-03 RX ADMIN — Medication 100 MILLIGRAM(S): at 17:36

## 2019-03-03 RX ADMIN — OXYCODONE HYDROCHLORIDE 5 MILLIGRAM(S): 5 TABLET ORAL at 06:30

## 2019-03-03 RX ADMIN — CILOSTAZOL 50 MILLIGRAM(S): 100 TABLET ORAL at 17:37

## 2019-03-03 RX ADMIN — PIPERACILLIN AND TAZOBACTAM 25 GRAM(S): 4; .5 INJECTION, POWDER, LYOPHILIZED, FOR SOLUTION INTRAVENOUS at 21:51

## 2019-03-03 RX ADMIN — CILOSTAZOL 50 MILLIGRAM(S): 100 TABLET ORAL at 05:27

## 2019-03-03 RX ADMIN — Medication 250 MILLIGRAM(S): at 10:18

## 2019-03-03 RX ADMIN — OXYCODONE HYDROCHLORIDE 5 MILLIGRAM(S): 5 TABLET ORAL at 17:36

## 2019-03-03 RX ADMIN — OXYCODONE HYDROCHLORIDE 5 MILLIGRAM(S): 5 TABLET ORAL at 05:32

## 2019-03-03 NOTE — PROGRESS NOTE ADULT - SUBJECTIVE AND OBJECTIVE BOX
Patient is a 83y old  Female who presents with a chief complaint of LLE wound dehiscence/cellulitis (02 Mar 2019 23:50)      INTERVAL HPI/OVERNIGHT EVENTS: none     MEDICATIONS  (STANDING):  apixaban 5 milliGRAM(s) Oral every 12 hours  chlorhexidine 4% Liquid 1 Application(s) Topical every 12 hours  cilostazol 50 milliGRAM(s) Oral two times a day  dextrose 50% Injectable 12.5 Gram(s) IV Push once  dextrose 50% Injectable 25 Gram(s) IV Push once  dextrose 50% Injectable 25 Gram(s) IV Push once  docusate sodium 100 milliGRAM(s) Oral two times a day  insulin lispro (HumaLOG) corrective regimen sliding scale   SubCutaneous three times a day before meals  lactobacillus acidophilus 1 Tablet(s) Oral two times a day with meals  metoprolol tartrate 12.5 milliGRAM(s) Oral two times a day  piperacillin/tazobactam IVPB. 3.375 Gram(s) IV Intermittent every 8 hours  pyridoxine 100 milliGRAM(s) Oral daily  senna 2 Tablet(s) Oral at bedtime  simvastatin 40 milliGRAM(s) Oral at bedtime  topiramate 100 milliGRAM(s) Oral two times a day  vancomycin  IVPB 750 milliGRAM(s) IV Intermittent every 12 hours    MEDICATIONS  (PRN):  acetaminophen   Tablet .. 650 milliGRAM(s) Oral every 6 hours PRN Temp greater or equal to 38C (100.4F), Mild Pain (1 - 3)  dextrose 40% Gel 15 Gram(s) Oral once PRN Blood Glucose LESS THAN 70 milliGRAM(s)/deciLiter  glucagon  Injectable 1 milliGRAM(s) IntraMuscular once PRN Glucose <70 milliGRAM(s)/deciLiter  oxyCODONE    IR 5 milliGRAM(s) Oral every 6 hours PRN moderate to severe pain        Orders last 24 hours:  POCT  Blood Glucose (03-02-19 @ 22:46)  Echocardiogram, Adult (03-03-19 @ 06:20)  POCT  Blood Glucose (03-03-19 @ 08:36)  POCT  Blood Glucose (03-03-19 @ 11:57)  POCT  Blood Glucose (03-03-19 @ 17:43)      Allergies    No Known Allergies    Intolerances        REVIEW OF SYSTEMS:  CARDIOVASCULAR: No chest pain, palpitations, dizziness, or leg swelling; no shortness of breath     RESPIRATORY: No cough, wheezing, chills or hemoptysis; No shortness of breath    GASTROINTESTINAL: No abdominal or epigastric pain. No nausea, vomiting, or hematemesis; No diarrhea or constipation. No melena or hematochezia.    NEUROLOGICAL: No headaches, memory loss, loss of strength, numbness      PHYSICAL EXAM:  Vital Signs Last 24 Hrs  T(C): 36.4 (03 Mar 2019 12:15), Max: 36.6 (02 Mar 2019 21:52)  T(F): 97.6 (03 Mar 2019 12:15), Max: 97.9 (02 Mar 2019 21:52)  HR: 85 (03 Mar 2019 17:27) (81 - 88)  BP: 121/75 (03 Mar 2019 17:27) (106/65 - 121/75)  BP(mean): --  RR: 16 (03 Mar 2019 17:27) (16 - 17)  SpO2: 96% (03 Mar 2019 17:27) (96% - 100%)    GENERAL: NAD, well-groomed, well-developed  HEAD:  Atraumatic, Normocephalic  EYES: EOMI, PERRLA, conjunctiva and sclera clear  NECK: Supple, No JVD, Normal thyroid  NERVOUS SYSTEM:  Alert & Oriented X3, Good concentration;  and symmetric  CHEST/LUNG: Clear to auscultation bilaterally; No rales, rhonchi, wheezing, or rubs  HEART: S1S2 irregularly irregular, with II/VI systolic murmur left sternal border, without  rub nor gallop  ABDOMEN: Soft, Nontender, Nondistended; Bowel sounds present        LABS:                        7.5    3.48  )-----------( 437      ( 03 Mar 2019 05:42 )             25.6     03 Mar 2019 05:42    139    |  106    |  11     ----------------------------<  140    3.7     |  23     |  0.74     Ca    8.8        03 Mar 2019 05:42  Mg     1.9       03 Mar 2019 05:42        CAPILLARY BLOOD GLUCOSE      POCT Blood Glucose.: 126 mg/dL (03 Mar 2019 17:23)  POCT Blood Glucose.: 159 mg/dL (03 Mar 2019 11:56)  POCT Blood Glucose.: 143 mg/dL (03 Mar 2019 08:35)  POCT Blood Glucose.: 174 mg/dL (02 Mar 2019 22:45)           assessment:  POD 7  slight drop in H/H   Echo remains pending    Continue antibiotics and wound care per surgery.   Actonel, which pt takes for osteoperosis, not on formulary

## 2019-03-04 LAB
ANION GAP SERPL CALC-SCNC: 9 MMO/L — SIGNIFICANT CHANGE UP (ref 7–14)
BUN SERPL-MCNC: 10 MG/DL — SIGNIFICANT CHANGE UP (ref 7–23)
CALCIUM SERPL-MCNC: 8.8 MG/DL — SIGNIFICANT CHANGE UP (ref 8.4–10.5)
CHLORIDE SERPL-SCNC: 106 MMOL/L — SIGNIFICANT CHANGE UP (ref 98–107)
CO2 SERPL-SCNC: 23 MMOL/L — SIGNIFICANT CHANGE UP (ref 22–31)
CREAT SERPL-MCNC: 0.82 MG/DL — SIGNIFICANT CHANGE UP (ref 0.5–1.3)
GLUCOSE BLDC GLUCOMTR-MCNC: 103 MG/DL — HIGH (ref 70–99)
GLUCOSE BLDC GLUCOMTR-MCNC: 109 MG/DL — HIGH (ref 70–99)
GLUCOSE BLDC GLUCOMTR-MCNC: 173 MG/DL — HIGH (ref 70–99)
GLUCOSE BLDC GLUCOMTR-MCNC: 176 MG/DL — HIGH (ref 70–99)
GLUCOSE SERPL-MCNC: 115 MG/DL — HIGH (ref 70–99)
HCT VFR BLD CALC: 25.4 % — LOW (ref 34.5–45)
HGB BLD-MCNC: 7.2 G/DL — LOW (ref 11.5–15.5)
MAGNESIUM SERPL-MCNC: 1.9 MG/DL — SIGNIFICANT CHANGE UP (ref 1.6–2.6)
MCHC RBC-ENTMCNC: 28.3 % — LOW (ref 32–36)
MCHC RBC-ENTMCNC: 28.8 PG — SIGNIFICANT CHANGE UP (ref 27–34)
MCV RBC AUTO: 101.6 FL — HIGH (ref 80–100)
NRBC # FLD: 0 K/UL — LOW (ref 25–125)
OB PNL STL: NEGATIVE — SIGNIFICANT CHANGE UP
PLATELET # BLD AUTO: 429 K/UL — HIGH (ref 150–400)
PMV BLD: 8.9 FL — SIGNIFICANT CHANGE UP (ref 7–13)
POTASSIUM SERPL-MCNC: 3.7 MMOL/L — SIGNIFICANT CHANGE UP (ref 3.5–5.3)
POTASSIUM SERPL-SCNC: 3.7 MMOL/L — SIGNIFICANT CHANGE UP (ref 3.5–5.3)
RBC # BLD: 2.5 M/UL — LOW (ref 3.8–5.2)
RBC # FLD: 20.1 % — HIGH (ref 10.3–14.5)
SODIUM SERPL-SCNC: 138 MMOL/L — SIGNIFICANT CHANGE UP (ref 135–145)
VANCOMYCIN TROUGH SERPL-MCNC: 16.5 UG/ML — SIGNIFICANT CHANGE UP (ref 10–20)
WBC # BLD: 3.4 K/UL — LOW (ref 3.8–10.5)
WBC # FLD AUTO: 3.4 K/UL — LOW (ref 3.8–10.5)

## 2019-03-04 PROCEDURE — 99232 SBSQ HOSP IP/OBS MODERATE 35: CPT

## 2019-03-04 RX ORDER — OXYCODONE HYDROCHLORIDE 5 MG/1
5 TABLET ORAL ONCE
Qty: 0 | Refills: 0 | Status: DISCONTINUED | OUTPATIENT
Start: 2019-03-04 | End: 2019-03-04

## 2019-03-04 RX ORDER — OXYCODONE HYDROCHLORIDE 5 MG/1
5 TABLET ORAL EVERY 6 HOURS
Qty: 0 | Refills: 0 | Status: DISCONTINUED | OUTPATIENT
Start: 2019-03-04 | End: 2019-03-11

## 2019-03-04 RX ADMIN — CILOSTAZOL 50 MILLIGRAM(S): 100 TABLET ORAL at 17:00

## 2019-03-04 RX ADMIN — Medication 1 TABLET(S): at 09:06

## 2019-03-04 RX ADMIN — Medication 250 MILLIGRAM(S): at 11:05

## 2019-03-04 RX ADMIN — SIMVASTATIN 40 MILLIGRAM(S): 20 TABLET, FILM COATED ORAL at 21:40

## 2019-03-04 RX ADMIN — Medication 250 MILLIGRAM(S): at 20:02

## 2019-03-04 RX ADMIN — OXYCODONE HYDROCHLORIDE 5 MILLIGRAM(S): 5 TABLET ORAL at 17:36

## 2019-03-04 RX ADMIN — APIXABAN 5 MILLIGRAM(S): 2.5 TABLET, FILM COATED ORAL at 17:00

## 2019-03-04 RX ADMIN — Medication 100 MILLIGRAM(S): at 17:01

## 2019-03-04 RX ADMIN — APIXABAN 5 MILLIGRAM(S): 2.5 TABLET, FILM COATED ORAL at 06:08

## 2019-03-04 RX ADMIN — CILOSTAZOL 50 MILLIGRAM(S): 100 TABLET ORAL at 06:07

## 2019-03-04 RX ADMIN — Medication 12.5 MILLIGRAM(S): at 17:00

## 2019-03-04 RX ADMIN — Medication 100 MILLIGRAM(S): at 13:26

## 2019-03-04 RX ADMIN — PIPERACILLIN AND TAZOBACTAM 25 GRAM(S): 4; .5 INJECTION, POWDER, LYOPHILIZED, FOR SOLUTION INTRAVENOUS at 13:26

## 2019-03-04 RX ADMIN — Medication 12.5 MILLIGRAM(S): at 06:08

## 2019-03-04 RX ADMIN — CHLORHEXIDINE GLUCONATE 1 APPLICATION(S): 213 SOLUTION TOPICAL at 06:07

## 2019-03-04 RX ADMIN — OXYCODONE HYDROCHLORIDE 5 MILLIGRAM(S): 5 TABLET ORAL at 16:56

## 2019-03-04 RX ADMIN — OXYCODONE HYDROCHLORIDE 5 MILLIGRAM(S): 5 TABLET ORAL at 00:50

## 2019-03-04 RX ADMIN — OXYCODONE HYDROCHLORIDE 5 MILLIGRAM(S): 5 TABLET ORAL at 06:13

## 2019-03-04 RX ADMIN — Medication 100 MILLIGRAM(S): at 06:07

## 2019-03-04 RX ADMIN — CHLORHEXIDINE GLUCONATE 1 APPLICATION(S): 213 SOLUTION TOPICAL at 17:02

## 2019-03-04 RX ADMIN — PIPERACILLIN AND TAZOBACTAM 25 GRAM(S): 4; .5 INJECTION, POWDER, LYOPHILIZED, FOR SOLUTION INTRAVENOUS at 21:40

## 2019-03-04 RX ADMIN — PIPERACILLIN AND TAZOBACTAM 25 GRAM(S): 4; .5 INJECTION, POWDER, LYOPHILIZED, FOR SOLUTION INTRAVENOUS at 05:00

## 2019-03-04 RX ADMIN — Medication 1: at 14:04

## 2019-03-04 RX ADMIN — Medication 1 TABLET(S): at 16:57

## 2019-03-04 NOTE — PROGRESS NOTE ADULT - ASSESSMENT
83F with PMH of afib (on Eliquis, currently held), DM, and diabetic neuropathy admitted after a fall with nonhealing LLE wound, now with wound VAC and plan for possible skin graft with PRS. CT on admission shows severe atherosclerotic disease in the left popliteal region.    - stable from vasc surg standpoint   - continue pletal  rx  -will follow

## 2019-03-04 NOTE — PROGRESS NOTE ADULT - SUBJECTIVE AND OBJECTIVE BOX
Patient is a 83y old  Female who presents with a chief complaint of LLE wound dehiscence/cellulitis (03 Mar 2019 20:32)      INTERVAL HPI/OVERNIGHT EVENTS:    MEDICATIONS  (STANDING):  apixaban 5 milliGRAM(s) Oral every 12 hours  chlorhexidine 4% Liquid 1 Application(s) Topical every 12 hours  cilostazol 50 milliGRAM(s) Oral two times a day  dextrose 50% Injectable 12.5 Gram(s) IV Push once  dextrose 50% Injectable 25 Gram(s) IV Push once  dextrose 50% Injectable 25 Gram(s) IV Push once  docusate sodium 100 milliGRAM(s) Oral two times a day  insulin lispro (HumaLOG) corrective regimen sliding scale   SubCutaneous three times a day before meals  lactobacillus acidophilus 1 Tablet(s) Oral two times a day with meals  metoprolol tartrate 12.5 milliGRAM(s) Oral two times a day  piperacillin/tazobactam IVPB. 3.375 Gram(s) IV Intermittent every 8 hours  pyridoxine 100 milliGRAM(s) Oral daily  senna 2 Tablet(s) Oral at bedtime  simvastatin 40 milliGRAM(s) Oral at bedtime  topiramate 100 milliGRAM(s) Oral two times a day  vancomycin  IVPB 750 milliGRAM(s) IV Intermittent every 12 hours                  Allergies    No Known Allergies    Intolerances        REVIEW OF SYSTEMS:  CARDIOVASCULAR: No chest pain, palpitations, dizziness, or leg swelling; no shortness of breath     RESPIRATORY: No cough, wheezing, chills or hemoptysis; No shortness of breath    GASTROINTESTINAL: No abdominal or epigastric pain. No nausea, vomiting, or hematemesis; No diarrhea or constipation. No melena or hematochezia.    NEUROLOGICAL: No headaches, memory loss, loss of strength, numbness      PHYSICAL EXAM:  Vital Signs Last 24 Hrs  T(C): 36.4 (04 Mar 2019 06:06), Max: 36.7 (03 Mar 2019 20:53)  T(F): 97.5 (04 Mar 2019 06:06), Max: 98 (03 Mar 2019 20:53)  HR: 82 (04 Mar 2019 06:06) (82 - 86)  BP: 104/58 (04 Mar 2019 06:06) (95/51 - 121/75)  BP(mean): --  RR: 17 (04 Mar 2019 06:06) (16 - 17)  SpO2: 98% (04 Mar 2019 06:06) (96% - 100%)    GENERAL: NAD, well-groomed, well-developed  HEAD:  Atraumatic, Normocephalic  EYES: EOMI, PERRLA, conjunctiva and sclera clear  NECK: Supple, No JVD, Normal thyroid  NERVOUS SYSTEM:  Alert & Oriented X3, Good concentration;  and symmetric  CHEST/LUNG: Clear to auscultation bilaterally; No rales, rhonchi, wheezing, or rubs  HEART: [id number] irregular, with II/VI systolic murmur left sternal border  ABDOMEN: Soft, Nontender, Nondistended; Bowel sounds present      LABS:                        7.2    3.40  )-----------( 429      ( 04 Mar 2019 03:00 )             25.4     04 Mar 2019 03:00    138    |  106    |  10     ----------------------------<  115    3.7     |  23     |  0.82     Ca    8.8        04 Mar 2019 03:00  Mg     1.9       04 Mar 2019 03:00        CAPILLARY BLOOD GLUCOSE      POCT Blood Glucose.: 109 mg/dL (04 Mar 2019 08:54)  POCT Blood Glucose.: 188 mg/dL (03 Mar 2019 21:49)  POCT Blood Glucose.: 126 mg/dL (03 Mar 2019 17:23)  POCT Blood Glucose.: 159 mg/dL (03 Mar 2019 11:56)        assessment:  pod 8       Plan:   wound care/ antibiotics per surgery.  Vac f/u    Follow h/h.    check stool for occult blood   Actonel non-formulary; pt instructed to take supply from home.

## 2019-03-04 NOTE — PROGRESS NOTE ADULT - SUBJECTIVE AND OBJECTIVE BOX
Patient is a 83y old  Female who presents with a chief complaint of LLE wound dehiscence/cellulitis (04 Mar 2019 09:00)      Vascular Surgery Attending Progress Note    Interval HPI: pt w/o new c/o     Medications:  acetaminophen   Tablet .. 650 milliGRAM(s) Oral every 6 hours PRN  apixaban 5 milliGRAM(s) Oral every 12 hours  chlorhexidine 4% Liquid 1 Application(s) Topical every 12 hours  cilostazol 50 milliGRAM(s) Oral two times a day  dextrose 40% Gel 15 Gram(s) Oral once PRN  dextrose 50% Injectable 12.5 Gram(s) IV Push once  dextrose 50% Injectable 25 Gram(s) IV Push once  dextrose 50% Injectable 25 Gram(s) IV Push once  docusate sodium 100 milliGRAM(s) Oral two times a day  glucagon  Injectable 1 milliGRAM(s) IntraMuscular once PRN  insulin lispro (HumaLOG) corrective regimen sliding scale   SubCutaneous three times a day before meals  lactobacillus acidophilus 1 Tablet(s) Oral two times a day with meals  metoprolol tartrate 12.5 milliGRAM(s) Oral two times a day  oxyCODONE    IR 5 milliGRAM(s) Oral every 6 hours PRN  piperacillin/tazobactam IVPB. 3.375 Gram(s) IV Intermittent every 8 hours  pyridoxine 100 milliGRAM(s) Oral daily  senna 2 Tablet(s) Oral at bedtime  simvastatin 40 milliGRAM(s) Oral at bedtime  topiramate 100 milliGRAM(s) Oral two times a day  vancomycin  IVPB 750 milliGRAM(s) IV Intermittent every 12 hours      Vital Signs Last 24 Hrs  T(C): 36.6 (04 Mar 2019 17:00), Max: 36.6 (04 Mar 2019 17:00)  T(F): 97.9 (04 Mar 2019 17:00), Max: 97.9 (04 Mar 2019 17:00)  HR: 78 (04 Mar 2019 17:00) (78 - 82)  BP: 119/64 (04 Mar 2019 17:00) (104/58 - 119/64)  BP(mean): --  RR: 18 (04 Mar 2019 17:00) (17 - 18)  SpO2: 98% (04 Mar 2019 17:00) (98% - 98%)  I&O's Summary      Physical Exam:  Neuro  A&Ox3 VSS  Vascular:  lle shin VAC in place    LABS:                        7.2    3.40  )-----------( 429      ( 04 Mar 2019 03:00 )             25.4     03-04    138  |  106  |  10  ----------------------------<  115<H>  3.7   |  23  |  0.82    Ca    8.8      04 Mar 2019 03:00  Mg     1.9     03-04          STORM BRYANT MD  [phone]

## 2019-03-05 LAB
ANION GAP SERPL CALC-SCNC: 11 MMO/L — SIGNIFICANT CHANGE UP (ref 7–14)
BASOPHILS # BLD AUTO: 0.02 K/UL — SIGNIFICANT CHANGE UP (ref 0–0.2)
BASOPHILS NFR BLD AUTO: 0.5 % — SIGNIFICANT CHANGE UP (ref 0–2)
BUN SERPL-MCNC: 11 MG/DL — SIGNIFICANT CHANGE UP (ref 7–23)
CALCIUM SERPL-MCNC: 9.4 MG/DL — SIGNIFICANT CHANGE UP (ref 8.4–10.5)
CHLORIDE SERPL-SCNC: 103 MMOL/L — SIGNIFICANT CHANGE UP (ref 98–107)
CO2 SERPL-SCNC: 23 MMOL/L — SIGNIFICANT CHANGE UP (ref 22–31)
CREAT SERPL-MCNC: 0.83 MG/DL — SIGNIFICANT CHANGE UP (ref 0.5–1.3)
EOSINOPHIL # BLD AUTO: 0.19 K/UL — SIGNIFICANT CHANGE UP (ref 0–0.5)
EOSINOPHIL NFR BLD AUTO: 4.7 % — SIGNIFICANT CHANGE UP (ref 0–6)
GLUCOSE BLDC GLUCOMTR-MCNC: 114 MG/DL — HIGH (ref 70–99)
GLUCOSE BLDC GLUCOMTR-MCNC: 126 MG/DL — HIGH (ref 70–99)
GLUCOSE BLDC GLUCOMTR-MCNC: 141 MG/DL — HIGH (ref 70–99)
GLUCOSE BLDC GLUCOMTR-MCNC: 169 MG/DL — HIGH (ref 70–99)
GLUCOSE SERPL-MCNC: 110 MG/DL — HIGH (ref 70–99)
HCT VFR BLD CALC: 30.8 % — LOW (ref 34.5–45)
HGB BLD-MCNC: 8.9 G/DL — LOW (ref 11.5–15.5)
IMM GRANULOCYTES NFR BLD AUTO: 0.2 % — SIGNIFICANT CHANGE UP (ref 0–1.5)
LYMPHOCYTES # BLD AUTO: 1.75 K/UL — SIGNIFICANT CHANGE UP (ref 1–3.3)
LYMPHOCYTES # BLD AUTO: 43.5 % — SIGNIFICANT CHANGE UP (ref 13–44)
MAGNESIUM SERPL-MCNC: 2 MG/DL — SIGNIFICANT CHANGE UP (ref 1.6–2.6)
MCHC RBC-ENTMCNC: 28.9 % — LOW (ref 32–36)
MCHC RBC-ENTMCNC: 29.5 PG — SIGNIFICANT CHANGE UP (ref 27–34)
MCV RBC AUTO: 102 FL — HIGH (ref 80–100)
MONOCYTES # BLD AUTO: 0.52 K/UL — SIGNIFICANT CHANGE UP (ref 0–0.9)
MONOCYTES NFR BLD AUTO: 12.9 % — SIGNIFICANT CHANGE UP (ref 2–14)
NEUTROPHILS # BLD AUTO: 1.53 K/UL — LOW (ref 1.8–7.4)
NEUTROPHILS NFR BLD AUTO: 38.2 % — LOW (ref 43–77)
NRBC # FLD: 0 K/UL — LOW (ref 25–125)
PLATELET # BLD AUTO: 562 K/UL — HIGH (ref 150–400)
PMV BLD: 9.3 FL — SIGNIFICANT CHANGE UP (ref 7–13)
POTASSIUM SERPL-MCNC: 3.8 MMOL/L — SIGNIFICANT CHANGE UP (ref 3.5–5.3)
POTASSIUM SERPL-SCNC: 3.8 MMOL/L — SIGNIFICANT CHANGE UP (ref 3.5–5.3)
RBC # BLD: 3.02 M/UL — LOW (ref 3.8–5.2)
RBC # FLD: 20.7 % — HIGH (ref 10.3–14.5)
SODIUM SERPL-SCNC: 137 MMOL/L — SIGNIFICANT CHANGE UP (ref 135–145)
VANCOMYCIN TROUGH SERPL-MCNC: 15.9 UG/ML — SIGNIFICANT CHANGE UP (ref 10–20)
WBC # BLD: 4.02 K/UL — SIGNIFICANT CHANGE UP (ref 3.8–10.5)
WBC # FLD AUTO: 4.02 K/UL — SIGNIFICANT CHANGE UP (ref 3.8–10.5)

## 2019-03-05 PROCEDURE — 99232 SBSQ HOSP IP/OBS MODERATE 35: CPT

## 2019-03-05 PROCEDURE — 93306 TTE W/DOPPLER COMPLETE: CPT | Mod: 26

## 2019-03-05 RX ADMIN — OXYCODONE HYDROCHLORIDE 5 MILLIGRAM(S): 5 TABLET ORAL at 07:30

## 2019-03-05 RX ADMIN — Medication 1: at 12:28

## 2019-03-05 RX ADMIN — CILOSTAZOL 50 MILLIGRAM(S): 100 TABLET ORAL at 18:09

## 2019-03-05 RX ADMIN — Medication 250 MILLIGRAM(S): at 11:13

## 2019-03-05 RX ADMIN — Medication 100 MILLIGRAM(S): at 07:08

## 2019-03-05 RX ADMIN — APIXABAN 5 MILLIGRAM(S): 2.5 TABLET, FILM COATED ORAL at 07:08

## 2019-03-05 RX ADMIN — PIPERACILLIN AND TAZOBACTAM 25 GRAM(S): 4; .5 INJECTION, POWDER, LYOPHILIZED, FOR SOLUTION INTRAVENOUS at 12:27

## 2019-03-05 RX ADMIN — Medication 12.5 MILLIGRAM(S): at 18:10

## 2019-03-05 RX ADMIN — CHLORHEXIDINE GLUCONATE 1 APPLICATION(S): 213 SOLUTION TOPICAL at 18:09

## 2019-03-05 RX ADMIN — PIPERACILLIN AND TAZOBACTAM 25 GRAM(S): 4; .5 INJECTION, POWDER, LYOPHILIZED, FOR SOLUTION INTRAVENOUS at 22:24

## 2019-03-05 RX ADMIN — OXYCODONE HYDROCHLORIDE 5 MILLIGRAM(S): 5 TABLET ORAL at 19:33

## 2019-03-05 RX ADMIN — Medication 100 MILLIGRAM(S): at 18:10

## 2019-03-05 RX ADMIN — OXYCODONE HYDROCHLORIDE 5 MILLIGRAM(S): 5 TABLET ORAL at 20:00

## 2019-03-05 RX ADMIN — OXYCODONE HYDROCHLORIDE 5 MILLIGRAM(S): 5 TABLET ORAL at 00:42

## 2019-03-05 RX ADMIN — Medication 250 MILLIGRAM(S): at 21:18

## 2019-03-05 RX ADMIN — OXYCODONE HYDROCHLORIDE 5 MILLIGRAM(S): 5 TABLET ORAL at 01:10

## 2019-03-05 RX ADMIN — OXYCODONE HYDROCHLORIDE 5 MILLIGRAM(S): 5 TABLET ORAL at 07:06

## 2019-03-05 RX ADMIN — APIXABAN 5 MILLIGRAM(S): 2.5 TABLET, FILM COATED ORAL at 18:09

## 2019-03-05 RX ADMIN — CHLORHEXIDINE GLUCONATE 1 APPLICATION(S): 213 SOLUTION TOPICAL at 07:08

## 2019-03-05 RX ADMIN — SIMVASTATIN 40 MILLIGRAM(S): 20 TABLET, FILM COATED ORAL at 21:18

## 2019-03-05 RX ADMIN — Medication 12.5 MILLIGRAM(S): at 07:05

## 2019-03-05 RX ADMIN — Medication 100 MILLIGRAM(S): at 11:14

## 2019-03-05 RX ADMIN — Medication 1 TABLET(S): at 07:06

## 2019-03-05 RX ADMIN — Medication 1 TABLET(S): at 18:08

## 2019-03-05 RX ADMIN — CILOSTAZOL 50 MILLIGRAM(S): 100 TABLET ORAL at 07:05

## 2019-03-05 RX ADMIN — PIPERACILLIN AND TAZOBACTAM 25 GRAM(S): 4; .5 INJECTION, POWDER, LYOPHILIZED, FOR SOLUTION INTRAVENOUS at 06:55

## 2019-03-05 NOTE — PROGRESS NOTE ADULT - SUBJECTIVE AND OBJECTIVE BOX
Patient transferred to room 3005 via wheelchair, baby in mom's arms. Bands read and verified, report given to KHOA Miner. Code alert # 37.    Patient is a 83y old  Female who presents with a chief complaint of LLE wound dehiscence/cellulitis (04 Mar 2019 21:10)      INTERVAL HPI/OVERNIGHT EVENTS:    MEDICATIONS  (STANDING):  apixaban 5 milliGRAM(s) Oral every 12 hours  chlorhexidine 4% Liquid 1 Application(s) Topical every 12 hours  cilostazol 50 milliGRAM(s) Oral two times a day  dextrose 50% Injectable 12.5 Gram(s) IV Push once  dextrose 50% Injectable 25 Gram(s) IV Push once  dextrose 50% Injectable 25 Gram(s) IV Push once  docusate sodium 100 milliGRAM(s) Oral two times a day  insulin lispro (HumaLOG) corrective regimen sliding scale   SubCutaneous three times a day before meals  lactobacillus acidophilus 1 Tablet(s) Oral two times a day with meals  metoprolol tartrate 12.5 milliGRAM(s) Oral two times a day  piperacillin/tazobactam IVPB. 3.375 Gram(s) IV Intermittent every 8 hours  pyridoxine 100 milliGRAM(s) Oral daily  senna 2 Tablet(s) Oral at bedtime  simvastatin 40 milliGRAM(s) Oral at bedtime  topiramate 100 milliGRAM(s) Oral two times a day  vancomycin  IVPB 750 milliGRAM(s) IV Intermittent every 12 hours    MEDICATIONS  (PRN):  acetaminophen   Tablet .. 650 milliGRAM(s) Oral every 6 hours PRN Temp greater or equal to 38C (100.4F), Mild Pain (1 - 3)  dextrose 40% Gel 15 Gram(s) Oral once PRN Blood Glucose LESS THAN 70 milliGRAM(s)/deciLiter  glucagon  Injectable 1 milliGRAM(s) IntraMuscular once PRN Glucose <70 milliGRAM(s)/deciLiter  oxyCODONE    IR 5 milliGRAM(s) Oral every 6 hours PRN moderate to severe pain              Allergies    No Known Allergies    Intolerances        PHYSICAL EXAM:  Vital Signs Last 24 Hrs  T(C): 36.5 (05 Mar 2019 07:03), Max: 36.6 (04 Mar 2019 17:00)  T(F): 97.7 (05 Mar 2019 07:03), Max: 97.9 (04 Mar 2019 17:00)  HR: 85 (05 Mar 2019 07:03) (78 - 85)  BP: 106/56 (05 Mar 2019 07:03) (106/56 - 119/64)  BP(mean): --  RR: 18 (05 Mar 2019 07:03) (18 - 18)  SpO2: 98% (05 Mar 2019 07:03) (98% - 98%)    GENERAL: NAD, well-groomed, well-developed  HEAD:  Atraumatic, Normocephalic  EYES: EOMI, PERRLA, conjunctiva and sclera clear  NECK: Supple, No JVD, Normal thyroid  NERVOUS SYSTEM:  Alert & Oriented X3, Good concentration;  and symmetric  CHEST/LUNG: Clear to auscultation bilaterally; No rales, rhonchi, wheezing, or rubs  HEART: S1S2 irregu.keith irregular, with II/VI systolic murmur left sternal border  ABDOMEN: Soft, Nontender, Nondistended; Bowel sounds present        LABS:      Ca    8.8        04 Mar 2019 03:00        CAPILLARY BLOOD GLUCOSE      POCT Blood Glucose.: 114 mg/dL (05 Mar 2019 08:42)  POCT Blood Glucose.: 176 mg/dL (04 Mar 2019 23:02)  POCT Blood Glucose.: 103 mg/dL (04 Mar 2019 17:14)  POCT Blood Glucose.: 173 mg/dL (04 Mar 2019 13:37)  POCT Blood Glucose.: 109 mg/dL (04 Mar 2019 08:54)      TELEMETRY:          assessment:  pt stable.      Plan:   Awaiting echo.  Check cbc.  Surgery f/u / wound care

## 2019-03-05 NOTE — PROGRESS NOTE ADULT - PROBLEM SELECTOR PROBLEM 1
Leg wound, left
Arterial insufficiency of lower extremity

## 2019-03-05 NOTE — PROGRESS NOTE ADULT - PROBLEM SELECTOR PROBLEM 2
Anemia due to acute blood loss
Leg wound, left, sequela

## 2019-03-05 NOTE — PROGRESS NOTE ADULT - PROBLEM SELECTOR PLAN 2
Given ADELAIDA, and decrease H/H , transfuse
I have personally  seen and examined the patient today and have noted the findings and formulated the plan of care.
I have seen and examined the patient today and agree with  the  evaluation, assessment and plan of the surgical house officer
I have personally  seen and examined the patient today and have noted the findings and formulated the plan of care.

## 2019-03-05 NOTE — PROGRESS NOTE ADULT - ASSESSMENT
83F with PMH of afib (on Eliquis, currently held), DM, and diabetic neuropathy admitted after a fall with nonhealing LLE wound, now with wound VAC and plan for possible skin graft with PRS. CT on admission shows severe atherosclerotic disease in the left popliteal region.    - No vascular surgery intervention  - Local wound care  - Continue Pletal  - Will follow     Vascular surgery  Pager [phone] 83F with PMH of afib (on Eliquis, currently held), DM, and diabetic neuropathy admitted after a fall with nonhealing LLE wound, now with wound VAC and plan for possible skin graft with PRS. CT on admission shows severe atherosclerotic disease in the left popliteal region.    - No vascular surgery intervention  - Local wound care  - Continue Pletal  - f/u  as outpt  reconsult prn

## 2019-03-05 NOTE — PROGRESS NOTE ADULT - SUBJECTIVE AND OBJECTIVE BOX
Vascular Surgery Progress Note      SUBJECTIVE: Patient seen and examined. No acute events overnight.       OBJECTIVE:     ** Vital signs / I&O's **    Vital Signs Last 24 Hrs  T(C): 36.5 (05 Mar 2019 07:03), Max: 36.6 (04 Mar 2019 17:00)  T(F): 97.7 (05 Mar 2019 07:03), Max: 97.9 (04 Mar 2019 17:00)  HR: 85 (05 Mar 2019 07:03) (78 - 85)  BP: 106/56 (05 Mar 2019 07:03) (106/56 - 119/64)  BP(mean): --  RR: 18 (05 Mar 2019 07:03) (18 - 18)  SpO2: 98% (05 Mar 2019 07:03) (98% - 98%)        ** Physical Exam **  Gen: Alert, NAD  Ext: Warm, exam stable    ** Labs **                          8.9    4.02  )-----------( 562      ( 05 Mar 2019 06:55 )             30.8     05 Mar 2019 06:55    137    |  103    |  11     ----------------------------<  110    3.8     |  23     |  0.83     Ca    9.4        05 Mar 2019 06:55  Mg     2.0       05 Mar 2019 06:55        CAPILLARY BLOOD GLUCOSE      POCT Blood Glucose.: 114 mg/dL (05 Mar 2019 08:42)  POCT Blood Glucose.: 176 mg/dL (04 Mar 2019 23:02)  POCT Blood Glucose.: 103 mg/dL (04 Mar 2019 17:14)  POCT Blood Glucose.: 173 mg/dL (04 Mar 2019 13:37)              MEDICATIONS  (STANDING):  apixaban 5 milliGRAM(s) Oral every 12 hours  chlorhexidine 4% Liquid 1 Application(s) Topical every 12 hours  cilostazol 50 milliGRAM(s) Oral two times a day  dextrose 50% Injectable 12.5 Gram(s) IV Push once  dextrose 50% Injectable 25 Gram(s) IV Push once  dextrose 50% Injectable 25 Gram(s) IV Push once  docusate sodium 100 milliGRAM(s) Oral two times a day  insulin lispro (HumaLOG) corrective regimen sliding scale   SubCutaneous three times a day before meals  lactobacillus acidophilus 1 Tablet(s) Oral two times a day with meals  metoprolol tartrate 12.5 milliGRAM(s) Oral two times a day  piperacillin/tazobactam IVPB. 3.375 Gram(s) IV Intermittent every 8 hours  pyridoxine 100 milliGRAM(s) Oral daily  senna 2 Tablet(s) Oral at bedtime  simvastatin 40 milliGRAM(s) Oral at bedtime  topiramate 100 milliGRAM(s) Oral two times a day  vancomycin  IVPB 750 milliGRAM(s) IV Intermittent every 12 hours    MEDICATIONS  (PRN):  acetaminophen   Tablet .. 650 milliGRAM(s) Oral every 6 hours PRN Temp greater or equal to 38C (100.4F), Mild Pain (1 - 3)  dextrose 40% Gel 15 Gram(s) Oral once PRN Blood Glucose LESS THAN 70 milliGRAM(s)/deciLiter  glucagon  Injectable 1 milliGRAM(s) IntraMuscular once PRN Glucose <70 milliGRAM(s)/deciLiter  oxyCODONE    IR 5 milliGRAM(s) Oral every 6 hours PRN moderate to severe pain Vascular Surgery Progress Note      SUBJECTIVE: Patient seen and examined. No acute events overnight.       OBJECTIVE:     ** Vital signs / I&O's **    Vital Signs Last 24 Hrs  T(C): 36.5 (05 Mar 2019 07:03), Max: 36.6 (04 Mar 2019 17:00)  T(F): 97.7 (05 Mar 2019 07:03), Max: 97.9 (04 Mar 2019 17:00)  HR: 85 (05 Mar 2019 07:03) (78 - 85)  BP: 106/56 (05 Mar 2019 07:03) (106/56 - 119/64)  BP(mean): --  RR: 18 (05 Mar 2019 07:03) (18 - 18)  SpO2: 98% (05 Mar 2019 07:03) (98% - 98%)        ** Physical Exam **  Gen: Alert, NAD  Ext: Warm, exam stable  lle vac in place c/d/i    ** Labs **                          8.9    4.02  )-----------( 562      ( 05 Mar 2019 06:55 )             30.8     05 Mar 2019 06:55    137    |  103    |  11     ----------------------------<  110    3.8     |  23     |  0.83     Ca    9.4        05 Mar 2019 06:55  Mg     2.0       05 Mar 2019 06:55        CAPILLARY BLOOD GLUCOSE      POCT Blood Glucose.: 114 mg/dL (05 Mar 2019 08:42)  POCT Blood Glucose.: 176 mg/dL (04 Mar 2019 23:02)  POCT Blood Glucose.: 103 mg/dL (04 Mar 2019 17:14)  POCT Blood Glucose.: 173 mg/dL (04 Mar 2019 13:37)              MEDICATIONS  (STANDING):  apixaban 5 milliGRAM(s) Oral every 12 hours  chlorhexidine 4% Liquid 1 Application(s) Topical every 12 hours  cilostazol 50 milliGRAM(s) Oral two times a day  dextrose 50% Injectable 12.5 Gram(s) IV Push once  dextrose 50% Injectable 25 Gram(s) IV Push once  dextrose 50% Injectable 25 Gram(s) IV Push once  docusate sodium 100 milliGRAM(s) Oral two times a day  insulin lispro (HumaLOG) corrective regimen sliding scale   SubCutaneous three times a day before meals  lactobacillus acidophilus 1 Tablet(s) Oral two times a day with meals  metoprolol tartrate 12.5 milliGRAM(s) Oral two times a day  piperacillin/tazobactam IVPB. 3.375 Gram(s) IV Intermittent every 8 hours  pyridoxine 100 milliGRAM(s) Oral daily  senna 2 Tablet(s) Oral at bedtime  simvastatin 40 milliGRAM(s) Oral at bedtime  topiramate 100 milliGRAM(s) Oral two times a day  vancomycin  IVPB 750 milliGRAM(s) IV Intermittent every 12 hours    MEDICATIONS  (PRN):  acetaminophen   Tablet .. 650 milliGRAM(s) Oral every 6 hours PRN Temp greater or equal to 38C (100.4F), Mild Pain (1 - 3)  dextrose 40% Gel 15 Gram(s) Oral once PRN Blood Glucose LESS THAN 70 milliGRAM(s)/deciLiter  glucagon  Injectable 1 milliGRAM(s) IntraMuscular once PRN Glucose <70 milliGRAM(s)/deciLiter  oxyCODONE    IR 5 milliGRAM(s) Oral every 6 hours PRN moderate to severe pain

## 2019-03-05 NOTE — PROGRESS NOTE ADULT - PROBLEM SELECTOR PLAN 1
(S/P fall) POD 1 debridement    Dr Landers, plastic surgery, to eval for skin graft   Continue care per Dr. Perry
I have personally  seen and examined the patient today and have noted the findings and formulated the plan of care.
I have seen and examined the patient today and agree with  the  evaluation, assessment and plan of the surgical house officer
I have personally  seen and examined the patient today and have noted the findings and formulated the plan of care.

## 2019-03-06 ENCOUNTER — TRANSCRIPTION ENCOUNTER (OUTPATIENT)
Age: 84
End: 2019-03-06

## 2019-03-06 LAB
BLD GP AB SCN SERPL QL: NEGATIVE — SIGNIFICANT CHANGE UP
GLUCOSE BLDC GLUCOMTR-MCNC: 123 MG/DL — HIGH (ref 70–99)
GLUCOSE BLDC GLUCOMTR-MCNC: 139 MG/DL — HIGH (ref 70–99)
GLUCOSE BLDC GLUCOMTR-MCNC: 150 MG/DL — HIGH (ref 70–99)
GLUCOSE BLDC GLUCOMTR-MCNC: 156 MG/DL — HIGH (ref 70–99)
HCT VFR BLD CALC: 25.5 % — LOW (ref 34.5–45)
HCT VFR BLD CALC: 25.7 % — LOW (ref 34.5–45)
HGB BLD-MCNC: 7.5 G/DL — LOW (ref 11.5–15.5)
HGB BLD-MCNC: 7.6 G/DL — LOW (ref 11.5–15.5)
MCHC RBC-ENTMCNC: 29 PG — SIGNIFICANT CHANGE UP (ref 27–34)
MCHC RBC-ENTMCNC: 29.1 PG — SIGNIFICANT CHANGE UP (ref 27–34)
MCHC RBC-ENTMCNC: 29.4 % — LOW (ref 32–36)
MCHC RBC-ENTMCNC: 29.6 % — LOW (ref 32–36)
MCV RBC AUTO: 98.1 FL — SIGNIFICANT CHANGE UP (ref 80–100)
MCV RBC AUTO: 98.8 FL — SIGNIFICANT CHANGE UP (ref 80–100)
NRBC # FLD: 0 K/UL — LOW (ref 25–125)
NRBC # FLD: 0.02 K/UL — LOW (ref 25–125)
PLATELET # BLD AUTO: 380 K/UL — SIGNIFICANT CHANGE UP (ref 150–400)
PLATELET # BLD AUTO: 397 K/UL — SIGNIFICANT CHANGE UP (ref 150–400)
PMV BLD: 8.5 FL — SIGNIFICANT CHANGE UP (ref 7–13)
PMV BLD: 8.9 FL — SIGNIFICANT CHANGE UP (ref 7–13)
RBC # BLD: 2.58 M/UL — LOW (ref 3.8–5.2)
RBC # BLD: 2.62 M/UL — LOW (ref 3.8–5.2)
RBC # FLD: 20 % — HIGH (ref 10.3–14.5)
RBC # FLD: 20.3 % — HIGH (ref 10.3–14.5)
RH IG SCN BLD-IMP: POSITIVE — SIGNIFICANT CHANGE UP
WBC # BLD: 3.98 K/UL — SIGNIFICANT CHANGE UP (ref 3.8–10.5)
WBC # BLD: 4.08 K/UL — SIGNIFICANT CHANGE UP (ref 3.8–10.5)
WBC # FLD AUTO: 3.98 K/UL — SIGNIFICANT CHANGE UP (ref 3.8–10.5)
WBC # FLD AUTO: 4.08 K/UL — SIGNIFICANT CHANGE UP (ref 3.8–10.5)

## 2019-03-06 PROCEDURE — 99238 HOSP IP/OBS DSCHRG MGMT 30/<: CPT

## 2019-03-06 RX ADMIN — APIXABAN 5 MILLIGRAM(S): 2.5 TABLET, FILM COATED ORAL at 17:51

## 2019-03-06 RX ADMIN — Medication 100 MILLIGRAM(S): at 12:52

## 2019-03-06 RX ADMIN — Medication 1 TABLET(S): at 17:51

## 2019-03-06 RX ADMIN — Medication 12.5 MILLIGRAM(S): at 17:52

## 2019-03-06 RX ADMIN — OXYCODONE HYDROCHLORIDE 5 MILLIGRAM(S): 5 TABLET ORAL at 21:15

## 2019-03-06 RX ADMIN — Medication 100 MILLIGRAM(S): at 05:55

## 2019-03-06 RX ADMIN — CILOSTAZOL 50 MILLIGRAM(S): 100 TABLET ORAL at 17:52

## 2019-03-06 RX ADMIN — Medication 1 TABLET(S): at 09:25

## 2019-03-06 RX ADMIN — OXYCODONE HYDROCHLORIDE 5 MILLIGRAM(S): 5 TABLET ORAL at 13:52

## 2019-03-06 RX ADMIN — Medication 100 MILLIGRAM(S): at 17:52

## 2019-03-06 RX ADMIN — SENNA PLUS 2 TABLET(S): 8.6 TABLET ORAL at 21:36

## 2019-03-06 RX ADMIN — Medication 250 MILLIGRAM(S): at 20:16

## 2019-03-06 RX ADMIN — SIMVASTATIN 40 MILLIGRAM(S): 20 TABLET, FILM COATED ORAL at 21:36

## 2019-03-06 RX ADMIN — CILOSTAZOL 50 MILLIGRAM(S): 100 TABLET ORAL at 05:55

## 2019-03-06 RX ADMIN — OXYCODONE HYDROCHLORIDE 5 MILLIGRAM(S): 5 TABLET ORAL at 20:16

## 2019-03-06 RX ADMIN — Medication 650 MILLIGRAM(S): at 17:53

## 2019-03-06 RX ADMIN — OXYCODONE HYDROCHLORIDE 5 MILLIGRAM(S): 5 TABLET ORAL at 03:16

## 2019-03-06 RX ADMIN — CHLORHEXIDINE GLUCONATE 1 APPLICATION(S): 213 SOLUTION TOPICAL at 17:52

## 2019-03-06 RX ADMIN — Medication 100 MILLIGRAM(S): at 17:53

## 2019-03-06 RX ADMIN — PIPERACILLIN AND TAZOBACTAM 25 GRAM(S): 4; .5 INJECTION, POWDER, LYOPHILIZED, FOR SOLUTION INTRAVENOUS at 12:51

## 2019-03-06 RX ADMIN — PIPERACILLIN AND TAZOBACTAM 25 GRAM(S): 4; .5 INJECTION, POWDER, LYOPHILIZED, FOR SOLUTION INTRAVENOUS at 21:36

## 2019-03-06 RX ADMIN — CHLORHEXIDINE GLUCONATE 1 APPLICATION(S): 213 SOLUTION TOPICAL at 05:55

## 2019-03-06 RX ADMIN — Medication 12.5 MILLIGRAM(S): at 07:23

## 2019-03-06 RX ADMIN — OXYCODONE HYDROCHLORIDE 5 MILLIGRAM(S): 5 TABLET ORAL at 12:52

## 2019-03-06 RX ADMIN — PIPERACILLIN AND TAZOBACTAM 25 GRAM(S): 4; .5 INJECTION, POWDER, LYOPHILIZED, FOR SOLUTION INTRAVENOUS at 05:54

## 2019-03-06 RX ADMIN — Medication 650 MILLIGRAM(S): at 18:44

## 2019-03-06 RX ADMIN — Medication 250 MILLIGRAM(S): at 09:25

## 2019-03-06 RX ADMIN — APIXABAN 5 MILLIGRAM(S): 2.5 TABLET, FILM COATED ORAL at 05:55

## 2019-03-06 NOTE — PROGRESS NOTE ADULT - SUBJECTIVE AND OBJECTIVE BOX
Patient is a 83y old  Female who presents with a chief complaint of LLE wound dehiscence/cellulitis PMH DM, Afib on Eliquis, s/p fall  Feb 9 and was impaled on her back on a kitchen appliance for 48 hours , hospitalized at [facility] for acute blood loss anemia , requiring 3 units of pRBC, seen by surgeon Dr. Dorian Lazaro and had wound VAc placed to her left back  and LLE (wound VAC LLE removed prior to discharge to rehab 4 days ago, who now presents with wound dehiscence LLE and low grade fever 99F at rehab and was started on zosyn via LUE PICC,In ED, CT LLE severe diffuse subcutaneous edema in the extremities, however, no collection or abscess and no advance OM, no necrotizing infection.  Underwent a debridement in the or and has had vac on  since       REVIEW OF SYSTEMS - no new changes, see pmh hpi others negative  Allergies    No Known Allergies    Intolerances       MEDICATIONS  (STANDING):  apixaban 5 milliGRAM(s) Oral every 12 hours  chlorhexidine 4% Liquid 1 Application(s) Topical every 12 hours  cilostazol 50 milliGRAM(s) Oral two times a day  dextrose 50% Injectable 12.5 Gram(s) IV Push once  dextrose 50% Injectable 25 Gram(s) IV Push once  dextrose 50% Injectable 25 Gram(s) IV Push once  docusate sodium 100 milliGRAM(s) Oral two times a day  insulin lispro (HumaLOG) corrective regimen sliding scale   SubCutaneous three times a day before meals  lactobacillus acidophilus 1 Tablet(s) Oral two times a day with meals  metoprolol tartrate 12.5 milliGRAM(s) Oral two times a day  piperacillin/tazobactam IVPB. 3.375 Gram(s) IV Intermittent every 8 hours  pyridoxine 100 milliGRAM(s) Oral daily  senna 2 Tablet(s) Oral at bedtime  simvastatin 40 milliGRAM(s) Oral at bedtime  topiramate 100 milliGRAM(s) Oral two times a day  vancomycin  IVPB 750 milliGRAM(s) IV Intermittent every 12 hours    MEDICATIONS  (PRN):  acetaminophen   Tablet .. 650 milliGRAM(s) Oral every 6 hours PRN Temp greater or equal to 38C (100.4F), Mild Pain (1 - 3)  dextrose 40% Gel 15 Gram(s) Oral once PRN Blood Glucose LESS THAN 70 milliGRAM(s)/deciLiter  glucagon  Injectable 1 milliGRAM(s) IntraMuscular once PRN Glucose <70 milliGRAM(s)/deciLiter  oxyCODONE    IR 5 milliGRAM(s) Oral every 6 hours PRN moderate to severe pain      FAMILY HISTORY:  Family history of chronic ischemic heart disease (Father)      Social history:non smoker    PAST MEDICAL & SURGICAL HISTORY:  Diabetes  Atrial fibrillation, unspecified type  No significant past surgical history      I&O's Summary      Vital Signs Last 24 Hrs  T(C): 36.4 (06 Mar 2019 05:52), Max: 36.7 (05 Mar 2019 18:05)  T(F): 97.6 (06 Mar 2019 05:52), Max: 98.1 (05 Mar 2019 18:05)  HR: 149 (06 Mar 2019 07:21) (60 - 149)  BP: 127/57 (06 Mar 2019 07:21) (101/54 - 127/57)  BP(mean): --  RR: 18 (06 Mar 2019 05:52) (16 - 18)  SpO2: 100% (06 Mar 2019 05:52) (98% - 100%)         CBC Full  -  ( 05 Mar 2019 06:55 )  WBC Count : 4.02 K/uL  Hemoglobin : 8.9 g/dL  Hematocrit : 30.8 %  Platelet Count - Automated : 562 K/uL  Mean Cell Volume : 102.0 fL  Mean Cell Hemoglobin : 29.5 pg  Mean Cell Hemoglobin Concentration : 28.9 %  Auto Neutrophil # : 1.53 K/uL  Auto Lymphocyte # : 1.75 K/uL  Auto Monocyte # : 0.52 K/uL  Auto Eosinophil # : 0.19 K/uL  Auto Basophil # : 0.02 K/uL  Auto Neutrophil % : 38.2 %  Auto Lymphocyte % : 43.5 %  Auto Monocyte % : 12.9 %  Auto Eosinophil % : 4.7 %  Auto Basophil % : 0.5 %      03-05    137  |  103  |  11  ----------------------------<  110<H>  3.8   |  23  |  0.83    Ca    9.4      05 Mar 2019 06:55  Mg     2.0     03-05                Radiology: leg doppler tbi  right .87 tbi left 1.05 non compressible

## 2019-03-06 NOTE — DISCHARGE NOTE PROVIDER - CARE PROVIDER_API CALL
Timoteo Sanderson)  Internal Medicine  [address]  [place] [address]  Phone: [phone]  Fax: [phone]  Follow Up Time:     Сергей Magaña)  Hematology; Internal Medicine; Medical Oncology  [address], [address]  [place] [address]  Phone: [phone]  Fax: [phone]  Follow Up Time:     Ashish Khan)  Infectious Disease; Internal Medicine  [address] [place] [address]  Phone: [phone]  Fax: [phone]  Follow Up Time:     Fely Berrios)  Internal MedicineCard Vas Dis  [address] [place] [address]  Phone: [phone]  Fax: [phone]  Follow Up Time:

## 2019-03-06 NOTE — PROGRESS NOTE ADULT - SUBJECTIVE AND OBJECTIVE BOX
Patient is a 83y old  Female who presents with a chief complaint of LLE wound dehiscence/cellulitis (06 Mar 2019 07:27)      INTERVAL HPI/OVERNIGHT EVENTS:    MEDICATIONS  (STANDING):  apixaban 5 milliGRAM(s) Oral every 12 hours  chlorhexidine 4% Liquid 1 Application(s) Topical every 12 hours  cilostazol 50 milliGRAM(s) Oral two times a day  dextrose 50% Injectable 12.5 Gram(s) IV Push once  dextrose 50% Injectable 25 Gram(s) IV Push once  dextrose 50% Injectable 25 Gram(s) IV Push once  docusate sodium 100 milliGRAM(s) Oral two times a day  insulin lispro (HumaLOG) corrective regimen sliding scale   SubCutaneous three times a day before meals  lactobacillus acidophilus 1 Tablet(s) Oral two times a day with meals  metoprolol tartrate 12.5 milliGRAM(s) Oral two times a day  piperacillin/tazobactam IVPB. 3.375 Gram(s) IV Intermittent every 8 hours  pyridoxine 100 milliGRAM(s) Oral daily  senna 2 Tablet(s) Oral at bedtime  simvastatin 40 milliGRAM(s) Oral at bedtime  topiramate 100 milliGRAM(s) Oral two times a day  vancomycin  IVPB 750 milliGRAM(s) IV Intermittent every 12 hours    MEDICATIONS  (PRN):  acetaminophen   Tablet .. 650 milliGRAM(s) Oral every 6 hours PRN Temp greater or equal to 38C (100.4F), Mild Pain (1 - 3)  dextrose 40% Gel 15 Gram(s) Oral once PRN Blood Glucose LESS THAN 70 milliGRAM(s)/deciLiter  glucagon  Injectable 1 milliGRAM(s) IntraMuscular once PRN Glucose <70 milliGRAM(s)/deciLiter  oxyCODONE    IR 5 milliGRAM(s) Oral every 6 hours PRN moderate to severe pain            Allergies    No Known Allergies    Intolerances        REVIEW OF SYSTEMS:  CARDIOVASCULAR: No chest pain, palpitations, dizziness, or leg swelling; no shortness of breath     RESPIRATORY: No cough, wheezing, chills or hemoptysis; No shortness of breath    GASTROINTESTINAL: No abdominal or epigastric pain. No nausea, vomiting, or hematemesis; No diarrhea or constipation. No melena or hematochezia.    NEUROLOGICAL: No headaches, memory loss, loss of strength, numbness      PHYSICAL EXAM:  Vital Signs Last 24 Hrs  T(C): 36.4 (06 Mar 2019 05:52), Max: 36.7 (05 Mar 2019 18:05)  T(F): 97.6 (06 Mar 2019 05:52), Max: 98.1 (05 Mar 2019 18:05)  HR: 149 (06 Mar 2019 07:21) (60 - 149)  BP: 127/57 (06 Mar 2019 07:21) (101/54 - 127/57)  BP(mean): --  RR: 18 (06 Mar 2019 05:52) (16 - 18)  SpO2: 100% (06 Mar 2019 05:52) (98% - 100%)    GENERAL: NAD, well-groomed, well-developed  HEAD:  Atraumatic, Normocephalic  EYES: EOMI, PERRLA, conjunctiva and sclera clear  NECK: Supple, No JVD, Normal thyroid  NERVOUS SYSTEM:  Alert & Oriented X3, Good concentration;  and symmetric  CHEST/LUNG: Clear to auscultation bilaterally; No rales, rhonchi, wheezing, or rubs  HEART: S1S2 irreguarly irregular, with II/VI systolic murmur left base, without , rub nor gallop  ABDOMEN: Soft, Nontender, Nondistended; Bowel sounds present      < from: Transthoracic Echocardiogram (03.05.19 @ 13:30) >  CONCLUSIONS:  1. Mitral annular calcification, otherwise normal mitral  valve. Mild mitral regurgitation.  2. Severely dilated left atrium.  LA volume index = 50  cc/m2.  3. Normal left ventricular internal dimensions and wall  thicknesses.  4. Endocardium not well visualized; grossly mild global  left ventricular systolic dysfunction.  5. Severe right atrial enlargement.  6. Unable to accurately evaluate right ventricular size or  systolic function.    < end of copied text >    LABS:      Ca    9.4        05 Mar 2019 06:55        CAPILLARY BLOOD GLUCOSE      POCT Blood Glucose.: 141 mg/dL (05 Mar 2019 21:25)  POCT Blood Glucose.: 126 mg/dL (05 Mar 2019 17:36)  POCT Blood Glucose.: 169 mg/dL (05 Mar 2019 12:26)  POCT Blood Glucose.: 114 mg/dL (05 Mar 2019 08:42)    H/H yesterday 8.9/30.6  TELEMETRY:        assessment:  H/H improved yesterday.   Awaiting surgical f/u.   Recheck cbc today (given rise yesterday)

## 2019-03-06 NOTE — DISCHARGE NOTE PROVIDER - NSDCCPCAREPLAN_GEN_ALL_CORE_FT
PRINCIPAL DISCHARGE DIAGNOSIS  Problem: Leg wound, left  Assessment and Plan of Treatment: s/p debridement , s/p antibiotics, wound Vac in place      SECONDARY DISCHARGE DIAGNOSES  Problem: Anemia  Assessment and Plan of Treatment:

## 2019-03-06 NOTE — PROGRESS NOTE ADULT - EXTREMITIES COMMENTS
left lat leg wound [id number] undermined at 3ock 2cm, serous drainage in vac  edema much improved in left leg

## 2019-03-06 NOTE — PROGRESS NOTE ADULT - ASSESSMENT
83y old  Female of LLE wound traumatic/surgical wound s/p NSTI infection with psuedomonas on her left leg wound  PMH DM, Afib on Eliquis  s/p acute blood loss anemia   s/[id number] of pRBC,,  s/p OR for debridement on 2/25, currently with vac on leg 100 with y connector to back   anemia improved, no bleeding with vac  would recommend :left leg  vac suction to 100 mmhg- freedom vac  use adaptic on wound base, cleansing with normal saline , agree with ace wrap  dc vac to upper back too much slough, not as deep- medihoney with alginate /foam.   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

## 2019-03-06 NOTE — DISCHARGE NOTE PROVIDER - CARE PROVIDERS DIRECT ADDRESSES
,DirectAddress_Unknown,DirectAddress_Unknown,[email or web address],DirectAddress_Unknown

## 2019-03-06 NOTE — DISCHARGE NOTE PROVIDER - NSDCFUADDINST_GEN_ALL_CORE_FT
You will be discharged with a wound VAC. Your wound VAC will be changed every 2-3 days by a nurse. Please follow all instructions given to you by the nurse.  Please follow up with Dr. Smith within 1-2 weeks after discharge from the hospital. You may call his office to schedule an appointment.

## 2019-03-06 NOTE — DISCHARGE NOTE PROVIDER - PROVIDER TOKENS
PROVIDER:[TOKEN:[[id number]]],PROVIDER:[TOKEN:[[id number]]],PROVIDER:[TOKEN:[[id number]]],PROVIDER:[TOKEN:[[id number]]]

## 2019-03-06 NOTE — DISCHARGE NOTE PROVIDER - HOSPITAL COURSE
82 y/o female, with a PmHx of DM, Afib on Eliquis, s/p recent fall from a stool impaled on the back by kitchen applicance for 48hr, hospitalized at [facility] for acute blood loss anemia 2/2 hematoma/hgb 6 (baseline around 9) s/p 3 units of pRBC, seen by surgeon Dr. Dorian Lazaro and had wound VAc placed to her left back  and LLE (wound VAC LLE removed prior to discharge to rehab 4 days ago, now presents to [facility] with wound dehiscence LLE and low grade fever 99F at rehab and was started on zosyn via LUE PICC placed a day ago, CT no collection/abscess, admitted for cellulitis and wound dehiscence. Transfer from [facility] to [facility] on 2/25.        + Surgery s/p debridement of LLE 2/24 - wound vac in place. plastics following for wound closure likely outpatient.    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 84 y/o female, with a PmHx of DM, Afib on Eliquis, s/p recent fall from a stool impaled on the back by kitchen applicance for 48hr, hospitalized at [facility] for acute blood loss anemia 2/2 hematoma/hgb 6 (baseline around 9) s/p 3 units of pRBC, seen by surgeon Dr. Dorian Lazaro and had wound VAc placed to her left back  and LLE (wound VAC LLE removed prior to discharge to rehab 4 days ago, now presents to [facility] with wound dehiscence LLE and low grade fever 99F at rehab and was started on zosyn via LUE PICC placed a day ago, CT no collection/abscess, admitted for cellulitis and wound dehiscence. Transfer from [facility] to [facility] on 2/25.        + Surgery s/p debridement of LLE 2/24 - wound vac in place. plastics following for wound closure likely outpatient.    + Open wound/cellulitis - s/p Vanco/Zosyn with left arm PICC. 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 follow up with Dr. Magaña as an outpt

## 2019-03-07 LAB
ANION GAP SERPL CALC-SCNC: 9 MMO/L — SIGNIFICANT CHANGE UP (ref 7–14)
BUN SERPL-MCNC: 10 MG/DL — SIGNIFICANT CHANGE UP (ref 7–23)
CALCIUM SERPL-MCNC: 8.6 MG/DL — SIGNIFICANT CHANGE UP (ref 8.4–10.5)
CHLORIDE SERPL-SCNC: 106 MMOL/L — SIGNIFICANT CHANGE UP (ref 98–107)
CO2 SERPL-SCNC: 23 MMOL/L — SIGNIFICANT CHANGE UP (ref 22–31)
CREAT SERPL-MCNC: 0.71 MG/DL — SIGNIFICANT CHANGE UP (ref 0.5–1.3)
GLUCOSE BLDC GLUCOMTR-MCNC: 101 MG/DL — HIGH (ref 70–99)
GLUCOSE BLDC GLUCOMTR-MCNC: 122 MG/DL — HIGH (ref 70–99)
GLUCOSE BLDC GLUCOMTR-MCNC: 135 MG/DL — HIGH (ref 70–99)
GLUCOSE BLDC GLUCOMTR-MCNC: 156 MG/DL — HIGH (ref 70–99)
GLUCOSE SERPL-MCNC: 97 MG/DL — SIGNIFICANT CHANGE UP (ref 70–99)
HCT VFR BLD CALC: 25.7 % — LOW (ref 34.5–45)
HGB BLD-MCNC: 7.5 G/DL — LOW (ref 11.5–15.5)
MAGNESIUM SERPL-MCNC: 2 MG/DL — SIGNIFICANT CHANGE UP (ref 1.6–2.6)
MCHC RBC-ENTMCNC: 29.2 % — LOW (ref 32–36)
MCHC RBC-ENTMCNC: 29.2 PG — SIGNIFICANT CHANGE UP (ref 27–34)
MCV RBC AUTO: 100 FL — SIGNIFICANT CHANGE UP (ref 80–100)
NRBC # FLD: 0 K/UL — LOW (ref 25–125)
PLATELET # BLD AUTO: 412 K/UL — HIGH (ref 150–400)
PMV BLD: 9.3 FL — SIGNIFICANT CHANGE UP (ref 7–13)
POTASSIUM SERPL-MCNC: 3.8 MMOL/L — SIGNIFICANT CHANGE UP (ref 3.5–5.3)
POTASSIUM SERPL-SCNC: 3.8 MMOL/L — SIGNIFICANT CHANGE UP (ref 3.5–5.3)
RBC # BLD: 2.57 M/UL — LOW (ref 3.8–5.2)
RBC # FLD: 20 % — HIGH (ref 10.3–14.5)
SODIUM SERPL-SCNC: 138 MMOL/L — SIGNIFICANT CHANGE UP (ref 135–145)
VANCOMYCIN TROUGH SERPL-MCNC: 16.5 UG/ML — SIGNIFICANT CHANGE UP (ref 10–20)
WBC # BLD: 3.71 K/UL — LOW (ref 3.8–10.5)
WBC # FLD AUTO: 3.71 K/UL — LOW (ref 3.8–10.5)

## 2019-03-07 RX ADMIN — CILOSTAZOL 50 MILLIGRAM(S): 100 TABLET ORAL at 06:01

## 2019-03-07 RX ADMIN — Medication 100 MILLIGRAM(S): at 06:01

## 2019-03-07 RX ADMIN — OXYCODONE HYDROCHLORIDE 5 MILLIGRAM(S): 5 TABLET ORAL at 12:25

## 2019-03-07 RX ADMIN — CHLORHEXIDINE GLUCONATE 1 APPLICATION(S): 213 SOLUTION TOPICAL at 17:40

## 2019-03-07 RX ADMIN — PIPERACILLIN AND TAZOBACTAM 25 GRAM(S): 4; .5 INJECTION, POWDER, LYOPHILIZED, FOR SOLUTION INTRAVENOUS at 06:01

## 2019-03-07 RX ADMIN — PIPERACILLIN AND TAZOBACTAM 25 GRAM(S): 4; .5 INJECTION, POWDER, LYOPHILIZED, FOR SOLUTION INTRAVENOUS at 13:22

## 2019-03-07 RX ADMIN — OXYCODONE HYDROCHLORIDE 5 MILLIGRAM(S): 5 TABLET ORAL at 05:19

## 2019-03-07 RX ADMIN — Medication 1 TABLET(S): at 10:23

## 2019-03-07 RX ADMIN — CILOSTAZOL 50 MILLIGRAM(S): 100 TABLET ORAL at 17:36

## 2019-03-07 RX ADMIN — Medication 250 MILLIGRAM(S): at 11:20

## 2019-03-07 RX ADMIN — CHLORHEXIDINE GLUCONATE 1 APPLICATION(S): 213 SOLUTION TOPICAL at 06:03

## 2019-03-07 RX ADMIN — OXYCODONE HYDROCHLORIDE 5 MILLIGRAM(S): 5 TABLET ORAL at 23:09

## 2019-03-07 RX ADMIN — Medication 1: at 13:21

## 2019-03-07 RX ADMIN — Medication 250 MILLIGRAM(S): at 20:09

## 2019-03-07 RX ADMIN — APIXABAN 5 MILLIGRAM(S): 2.5 TABLET, FILM COATED ORAL at 17:37

## 2019-03-07 RX ADMIN — SIMVASTATIN 40 MILLIGRAM(S): 20 TABLET, FILM COATED ORAL at 21:28

## 2019-03-07 RX ADMIN — PIPERACILLIN AND TAZOBACTAM 25 GRAM(S): 4; .5 INJECTION, POWDER, LYOPHILIZED, FOR SOLUTION INTRAVENOUS at 21:28

## 2019-03-07 RX ADMIN — Medication 12.5 MILLIGRAM(S): at 17:36

## 2019-03-07 RX ADMIN — OXYCODONE HYDROCHLORIDE 5 MILLIGRAM(S): 5 TABLET ORAL at 11:33

## 2019-03-07 RX ADMIN — APIXABAN 5 MILLIGRAM(S): 2.5 TABLET, FILM COATED ORAL at 06:01

## 2019-03-07 RX ADMIN — Medication 100 MILLIGRAM(S): at 17:37

## 2019-03-07 RX ADMIN — OXYCODONE HYDROCHLORIDE 5 MILLIGRAM(S): 5 TABLET ORAL at 04:19

## 2019-03-07 RX ADMIN — Medication 100 MILLIGRAM(S): at 13:23

## 2019-03-07 RX ADMIN — Medication 1 TABLET(S): at 17:36

## 2019-03-07 NOTE — DIETITIAN INITIAL EVALUATION ADULT. - PERTINENT LABORATORY DATA
03-07 Na138 mmol/L Glu 97 mg/dL K+ 3.8 mmol/L Cr  0.71 mg/dL BUN 10 mg/dL 02-25 [id number] 5.2 % 03-07 Na138 mmol/L Glu 97 mg/dL K+ 3.8 mmol/L Cr  0.71 mg/dL BUN 10 mg/dL 02-25 [id number] 5.2 %. POCT: 122, 156, 150 mg/dL

## 2019-03-07 NOTE — PROGRESS NOTE ADULT - SUBJECTIVE AND OBJECTIVE BOX
Patient is a 83y old  Female who presents with a chief complaint of LLE wound dehiscence/cellulitis (06 Mar 2019 11:51)      INTERVAL HPI/OVERNIGHT EVENTS: none      MEDICATIONS  (STANDING):  apixaban 5 milliGRAM(s) Oral every 12 hours  chlorhexidine 4% Liquid 1 Application(s) Topical every 12 hours  cilostazol 50 milliGRAM(s) Oral two times a day  dextrose 50% Injectable 12.5 Gram(s) IV Push once  dextrose 50% Injectable 25 Gram(s) IV Push once  dextrose 50% Injectable 25 Gram(s) IV Push once  docusate sodium 100 milliGRAM(s) Oral two times a day  insulin lispro (HumaLOG) corrective regimen sliding scale   SubCutaneous three times a day before meals  lactobacillus acidophilus 1 Tablet(s) Oral two times a day with meals  metoprolol tartrate 12.5 milliGRAM(s) Oral two times a day  piperacillin/tazobactam IVPB. 3.375 Gram(s) IV Intermittent every 8 hours  pyridoxine 100 milliGRAM(s) Oral daily  senna 2 Tablet(s) Oral at bedtime  simvastatin 40 milliGRAM(s) Oral at bedtime  topiramate 100 milliGRAM(s) Oral two times a day  vancomycin  IVPB 750 milliGRAM(s) IV Intermittent every 12 hours                Allergies    No Known Allergies    Intolerances        REVIEW OF SYSTEMS:  CARDIOVASCULAR: No chest pain, palpitations, dizziness, or leg swelling; no shortness of breath     RESPIRATORY: No cough, wheezing, chills or hemoptysis; No shortness of breath    GASTROINTESTINAL: No abdominal or epigastric pain. No nausea, vomiting, or hematemesis; No diarrhea or constipation. No melena or hematochezia.    NEUROLOGICAL: No headaches, memory loss, loss of strength, numbness      PHYSICAL EXAM:  Vital Signs Last 24 Hrs  T(C): 36.4 (07 Mar 2019 05:48), Max: 37 (06 Mar 2019 15:19)  T(F): 97.5 (07 Mar 2019 05:48), Max: 98.6 (06 Mar 2019 15:19)  HR: 84 (07 Mar 2019 05:48) (80 - 99)  BP: 97/44 (07 Mar 2019 05:48) (94/50 - 106/57)  BP(mean): --  RR: 18 (07 Mar 2019 05:48) (16 - 18)  SpO2: 100% (07 Mar 2019 05:48) (97% - 100%)    GENERAL: NAD, well-groomed, well-developed  HEAD:  Atraumatic, Normocephalic  EYES: EOMI, PERRLA, conjunctiva and sclera clear  NECK: Supple, No JVD, Normal thyroid  NERVOUS SYSTEM:  Alert & Oriented X3, Good concentration;  and symmetric  CHEST/LUNG: Clear to auscultation bilaterally; No rales, rhonchi, wheezing, or rubs  HEART: S1S2 irregularly irregular, with I/VI systolic murmur left sternal border, without rub nor gallop  ABDOMEN: Soft, Nontender, Nondistended; Bowel sounds present  a      LABS:                        7.5    3.71  )-----------( 412      ( 07 Mar 2019 06:45 )             25.7     07 Mar 2019 06:45    138    |  106    |  10     ----------------------------<  97     3.8     |  23     |  0.71     Ca    8.6        07 Mar 2019 06:45  Mg     2.0       07 Mar 2019 06:45        CAPILLARY BLOOD GLUCOSE      POCT Blood Glucose.: 122 mg/dL (07 Mar 2019 08:33)  POCT Blood Glucose.: 156 mg/dL (06 Mar 2019 22:51)  POCT Blood Glucose.: 150 mg/dL (06 Mar 2019 17:23)  POCT Blood Glucose.: 139 mg/dL (06 Mar 2019 12:39)      Consultant(s) Notes Reviewed:  Dr Cordova       assessment:  anemia ?  Plan:   For heme eval.  Wound care per Dr. Cordova

## 2019-03-07 NOTE — DIETITIAN INITIAL EVALUATION ADULT. - ADHERENCE
Follows an overall healthy balance diet. Patient with history of DM- Does not monitor BG at home. Hba1c: 5.2%. Patient is aware of foods that affect blood sugar.

## 2019-03-07 NOTE — PROVIDER CONTACT NOTE (OTHER) - BACKGROUND
Pt admitted with left lower leg wound which was surgically debrided and has a wound vac collecting drainage

## 2019-03-07 NOTE — PROVIDER CONTACT NOTE (OTHER) - SITUATION
Pt has wound vac to left lower leg which is actively collecting drainage but the wound vac is reading "occlusion".

## 2019-03-07 NOTE — DIETITIAN INITIAL EVALUATION ADULT. - ORAL INTAKE PTA
Patient reported good appetite- however consumes all meals - does not consume large meals. Patient reported she has GERD and is aware of foods to avoid./good

## 2019-03-07 NOTE — CONSULT NOTE ADULT - SUBJECTIVE AND OBJECTIVE BOX
Chief Complaint:  Patient is a 83y old  Female who presents with a chief complaint of LLE wound dehiscence/cellulitis (07 Mar 2019 09:22)      HPI:  patient fell from a chair at home and developed significant wounds.  She was treated at [facility] and was transferred to rehab.  She was sent here to [facility] from there for wound issues.  She does have anemia and has required 5 units of blood over the past couple of months.  She notes that she ha shad a chronic anemia with Hgb around 10.  She has never seen a hematologist.  She does have a leucopenia and she notes that it has been chronic as well.  She does also have a thrombocytosis which she is not aware of.  She last had a colonoscopy in 2011.  Denies recurrent infections.      Medications:  acetaminophen   Tablet .. 650 milliGRAM(s) Oral every 6 hours PRN  apixaban 5 milliGRAM(s) Oral every 12 hours  chlorhexidine 4% Liquid 1 Application(s) Topical every 12 hours  cilostazol 50 milliGRAM(s) Oral two times a day  dextrose 40% Gel 15 Gram(s) Oral once PRN  dextrose 50% Injectable 12.5 Gram(s) IV Push once  dextrose 50% Injectable 25 Gram(s) IV Push once  dextrose 50% Injectable 25 Gram(s) IV Push once  docusate sodium 100 milliGRAM(s) Oral two times a day  glucagon  Injectable 1 milliGRAM(s) IntraMuscular once PRN  insulin lispro (HumaLOG) corrective regimen sliding scale   SubCutaneous three times a day before meals  lactobacillus acidophilus 1 Tablet(s) Oral two times a day with meals  metoprolol tartrate 12.5 milliGRAM(s) Oral two times a day  oxyCODONE    IR 5 milliGRAM(s) Oral every 6 hours PRN  piperacillin/tazobactam IVPB. 3.375 Gram(s) IV Intermittent every 8 hours  pyridoxine 100 milliGRAM(s) Oral daily  senna 2 Tablet(s) Oral at bedtime  simvastatin 40 milliGRAM(s) Oral at bedtime  topiramate 100 milliGRAM(s) Oral two times a day  vancomycin  IVPB 750 milliGRAM(s) IV Intermittent every 12 hours    Allergies:  No Known Allergies  FAMILY HISTORY:  Mother had WM      Social history:  , retired RN.  No tobacco, ETOH or IVDA    PAST MEDICAL & SURGICAL HISTORY:  Diabetes  Atrial fibrillation, unspecified type  No significant past surgical history    REVIEW OF SYSTEMS      General: has some fatigue.  Appetite is fair.  No weight loss.  No fevers, chills, or sweats    Skin/Breast: No rash, or itchiness.  She has some bruising  	  Ophthalmologic: No vision changes  	  ENMT:	She has hearing loss.  Denies nosebleeds, congestion, and sore throat    Respiratory and Thorax: No SOB, cough, wheeze, hemoptysis  	  Cardiovascular:	No CP or palpitations    Gastrointestinal:	Occasional heartburn.  Denies N/V/D/C, abd pain, dark stool, BRBPR    Genitourinary:	No dysuria, frequency, or hematuria    Musculoskeletal:	 No back pain.  She has joint pain and has chronic edema    Neurological: No HA, dizziness, numbness, tingling    Psychiatric: has insomnia.  No depression or anxiety      Vitals:  Vital Signs Last 24 Hrs  T(C): 36.3 (07 Mar 2019 21:03), Max: 37.1 (07 Mar 2019 17:15)  T(F): 97.4 (07 Mar 2019 21:03), Max: 98.8 (07 Mar 2019 17:15)  HR: 80 (07 Mar 2019 21:03) (79 - 95)  BP: 102/56 (07 Mar 2019 21:03) (96/62 - 108/62)  BP(mean): --  RR: 18 (07 Mar 2019 21:03) (17 - 18)  SpO2: 100% (07 Mar 2019 21:03) (98% - 100%)    Pex:  alert NAD  EOMI anicteric sclera  Neck Supple No LNA  Cv Irregular, positive murmur  Lungs clear B/L  abd soft NT ND +BS  B/L LE edema no tenderness  scattered ecchymoses      Labs:                        7.5    3.71  )-----------( 412      ( 07 Mar 2019 06:45 )             25.7     CBC Full  -  ( 07 Mar 2019 06:45 )  WBC Count : 3.71 K/uL  Hemoglobin : 7.5 g/dL  Hematocrit : 25.7 %  Platelet Count - Automated : 412 K/uL  Mean Cell Volume : 100.0 fL  Mean Cell Hemoglobin : 29.2 pg  Mean Cell Hemoglobin Concentration : 29.2 %  Auto Neutrophil # : x  Auto Lymphocyte # : x  Auto Monocyte # : x  Auto Eosinophil # : x  Auto Basophil # : x  Auto Neutrophil % : x  Auto Lymphocyte % : x  Auto Monocyte % : x  Auto Eosinophil % : x  Auto Basophil % : x    03-07    138  |  106  |  10  ----------------------------<  97  3.8   |  23  |  0.71    Ca    8.6      07 Mar 2019 06:45  Mg     2.0     03-07        [id number]

## 2019-03-07 NOTE — DIETITIAN INITIAL EVALUATION ADULT. - PERTINENT MEDS FT
MEDICATIONS  (STANDING):  apixaban 5 milliGRAM(s) Oral every 12 hours  chlorhexidine 4% Liquid 1 Application(s) Topical every 12 hours  cilostazol 50 milliGRAM(s) Oral two times a day  dextrose 50% Injectable 12.5 Gram(s) IV Push once  dextrose 50% Injectable 25 Gram(s) IV Push once  dextrose 50% Injectable 25 Gram(s) IV Push once  docusate sodium 100 milliGRAM(s) Oral two times a day  insulin lispro (HumaLOG) corrective regimen sliding scale   SubCutaneous three times a day before meals  lactobacillus acidophilus 1 Tablet(s) Oral two times a day with meals  metoprolol tartrate 12.5 milliGRAM(s) Oral two times a day  piperacillin/tazobactam IVPB. 3.375 Gram(s) IV Intermittent every 8 hours  pyridoxine 100 milliGRAM(s) Oral daily  senna 2 Tablet(s) Oral at bedtime  simvastatin 40 milliGRAM(s) Oral at bedtime  topiramate 100 milliGRAM(s) Oral two times a day  vancomycin  IVPB 750 milliGRAM(s) IV Intermittent every 12 hours    MEDICATIONS  (PRN):  acetaminophen   Tablet .. 650 milliGRAM(s) Oral every 6 hours PRN Temp greater or equal to 38C (100.4F), Mild Pain (1 - 3)  dextrose 40% Gel 15 Gram(s) Oral once PRN Blood Glucose LESS THAN 70 milliGRAM(s)/deciLiter  glucagon  Injectable 1 milliGRAM(s) IntraMuscular once PRN Glucose <70 milliGRAM(s)/deciLiter  oxyCODONE    IR 5 milliGRAM(s) Oral every 6 hours PRN moderate to severe pain

## 2019-03-07 NOTE — DIETITIAN INITIAL EVALUATION ADULT. - PROBLEM SELECTOR PLAN 1
-LLE open wound with e/o wound dehiscence, wound VAC removed prior to d/c to rehab from [facility] 4 days ago, pt was seen by surgeon Dr. Dorian Lazaro at [facility], still has left back wound VAC (last changed on Friday)  -e/o LLE cellulitis with wound dehiscence  -cover with vanco/zosyn, has PICC line placed a day ago at rehab  -surgery consult for wound care/wound VAC management

## 2019-03-07 NOTE — DIETITIAN INITIAL EVALUATION ADULT. - PROBLEM SELECTOR PLAN 3
-Likely anemia of chronic disease and acute blood loss anemia, baseline Hgb ~9, down to 6 after fall and hematoma, transfused 3 units pRBC at [facility] [place]  -no active bleed, monitor CBC, transfuse prn to keep Hgb>7  -b12 1896, folate >20, check iron panel

## 2019-03-07 NOTE — DIETITIAN INITIAL EVALUATION ADULT. - OTHER INFO
Patient seen for extended length of stay. Patient reported having variable PO during hospital course- patient reported she does not have a large appetite. Nutritional supplement offered to patient to help with PO intake however patient declined at this time. Patient denies any nausea/vomiting/diarrhea/constipation or difficulty chewing and swallowing. Patient reports no food allergies or intolerances. Denies recent weight change. Current weight: 212 pounds. Patient noted with +2 edema- left and right leg and left and right knee.

## 2019-03-07 NOTE — PROVIDER CONTACT NOTE (OTHER) - ASSESSMENT
Pt denies any discomfort at the site. Seal intact. Drainage seen in tubing going towards collection chamber. Drainage collecting in chamber

## 2019-03-07 NOTE — DIETITIAN INITIAL EVALUATION ADULT. - ENERGY NEEDS
Ht: 64 inches Wt: 212 pounds BMI: 36.3 kg/m2 IBW: 120 pounds (+/-10%) %IBW: 176%    Edema: + 2 edema left and right leg, left and right knee  Skin: patient noted with wounds-- left leg wounds, left posterior back

## 2019-03-08 LAB
ANION GAP SERPL CALC-SCNC: 8 MMO/L — SIGNIFICANT CHANGE UP (ref 7–14)
BUN SERPL-MCNC: 10 MG/DL — SIGNIFICANT CHANGE UP (ref 7–23)
CALCIUM SERPL-MCNC: 8.6 MG/DL — SIGNIFICANT CHANGE UP (ref 8.4–10.5)
CHLORIDE SERPL-SCNC: 106 MMOL/L — SIGNIFICANT CHANGE UP (ref 98–107)
CO2 SERPL-SCNC: 24 MMOL/L — SIGNIFICANT CHANGE UP (ref 22–31)
CREAT SERPL-MCNC: 0.75 MG/DL — SIGNIFICANT CHANGE UP (ref 0.5–1.3)
GLUCOSE BLDC GLUCOMTR-MCNC: 114 MG/DL — HIGH (ref 70–99)
GLUCOSE BLDC GLUCOMTR-MCNC: 114 MG/DL — HIGH (ref 70–99)
GLUCOSE BLDC GLUCOMTR-MCNC: 136 MG/DL — HIGH (ref 70–99)
GLUCOSE BLDC GLUCOMTR-MCNC: 169 MG/DL — HIGH (ref 70–99)
GLUCOSE SERPL-MCNC: 97 MG/DL — SIGNIFICANT CHANGE UP (ref 70–99)
HAPTOGLOB SERPL-MCNC: 181 MG/DL — SIGNIFICANT CHANGE UP (ref 34–200)
HCT VFR BLD CALC: 26.5 % — LOW (ref 34.5–45)
HGB BLD-MCNC: 7.9 G/DL — LOW (ref 11.5–15.5)
LDH SERPL L TO P-CCNC: 146 U/L — SIGNIFICANT CHANGE UP (ref 135–225)
MCHC RBC-ENTMCNC: 29.2 PG — SIGNIFICANT CHANGE UP (ref 27–34)
MCHC RBC-ENTMCNC: 29.8 % — LOW (ref 32–36)
MCV RBC AUTO: 97.8 FL — SIGNIFICANT CHANGE UP (ref 80–100)
NRBC # FLD: 0 K/UL — LOW (ref 25–125)
PLATELET # BLD AUTO: 341 K/UL — SIGNIFICANT CHANGE UP (ref 150–400)
PMV BLD: 9 FL — SIGNIFICANT CHANGE UP (ref 7–13)
POTASSIUM SERPL-MCNC: 3.6 MMOL/L — SIGNIFICANT CHANGE UP (ref 3.5–5.3)
POTASSIUM SERPL-SCNC: 3.6 MMOL/L — SIGNIFICANT CHANGE UP (ref 3.5–5.3)
RBC # BLD: 2.71 M/UL — LOW (ref 3.8–5.2)
RBC # FLD: 20 % — HIGH (ref 10.3–14.5)
SODIUM SERPL-SCNC: 138 MMOL/L — SIGNIFICANT CHANGE UP (ref 135–145)
WBC # BLD: 2.62 K/UL — LOW (ref 3.8–10.5)
WBC # FLD AUTO: 2.62 K/UL — LOW (ref 3.8–10.5)

## 2019-03-08 PROCEDURE — 86334 IMMUNOFIX E-PHORESIS SERUM: CPT | Mod: 26

## 2019-03-08 PROCEDURE — 99222 1ST HOSP IP/OBS MODERATE 55: CPT | Mod: GC

## 2019-03-08 RX ORDER — ACETAMINOPHEN 500 MG
2 TABLET ORAL
Qty: 0 | Refills: 0 | COMMUNITY
Start: 2019-03-08

## 2019-03-08 RX ORDER — CILOSTAZOL 100 MG/1
1 TABLET ORAL
Qty: 0 | Refills: 0 | DISCHARGE
Start: 2019-03-08

## 2019-03-08 RX ORDER — CILOSTAZOL 100 MG/1
1 TABLET ORAL
Qty: 0 | Refills: 0 | COMMUNITY
Start: 2019-03-08

## 2019-03-08 RX ORDER — RISEDRONATE SODIUM 25.8; 4.2 MG/1; MG/1
1 TABLET, FILM COATED ORAL
Qty: 0 | Refills: 0 | COMMUNITY

## 2019-03-08 RX ORDER — ACETAMINOPHEN 500 MG
2 TABLET ORAL
Qty: 0 | Refills: 0 | DISCHARGE
Start: 2019-03-08

## 2019-03-08 RX ORDER — OXYCODONE HYDROCHLORIDE 5 MG/1
1 TABLET ORAL
Qty: 0 | Refills: 0 | DISCHARGE
Start: 2019-03-08

## 2019-03-08 RX ORDER — CHLORHEXIDINE GLUCONATE 213 G/1000ML
1 SOLUTION TOPICAL
Qty: 0 | Refills: 0 | DISCHARGE
Start: 2019-03-08

## 2019-03-08 RX ORDER — METFORMIN HYDROCHLORIDE 850 MG/1
1 TABLET ORAL
Qty: 0 | Refills: 0 | COMMUNITY

## 2019-03-08 RX ORDER — APIXABAN 2.5 MG/1
5 TABLET, FILM COATED ORAL
Qty: 0 | Refills: 0 | COMMUNITY

## 2019-03-08 RX ORDER — DOCUSATE SODIUM 100 MG
1 CAPSULE ORAL
Qty: 0 | Refills: 0 | DISCHARGE
Start: 2019-03-08

## 2019-03-08 RX ORDER — TOPIRAMATE 25 MG
1 TABLET ORAL
Qty: 0 | Refills: 0 | COMMUNITY

## 2019-03-08 RX ORDER — OXYCODONE HYDROCHLORIDE 5 MG/1
1 TABLET ORAL
Qty: 0 | Refills: 0 | COMMUNITY
Start: 2019-03-08

## 2019-03-08 RX ORDER — METOPROLOL TARTRATE 50 MG
0.5 TABLET ORAL
Qty: 0 | Refills: 0 | COMMUNITY

## 2019-03-08 RX ORDER — DOCUSATE SODIUM 100 MG
1 CAPSULE ORAL
Qty: 0 | Refills: 0 | COMMUNITY
Start: 2019-03-08

## 2019-03-08 RX ORDER — APIXABAN 2.5 MG/1
1 TABLET, FILM COATED ORAL
Qty: 0 | Refills: 0 | COMMUNITY
Start: 2019-03-08

## 2019-03-08 RX ORDER — LACTOBACILLUS ACIDOPHILUS 100MM CELL
1 CAPSULE ORAL
Qty: 0 | Refills: 0 | DISCHARGE
Start: 2019-03-08

## 2019-03-08 RX ORDER — CHOLECALCIFEROL (VITAMIN D3) 125 MCG
1 CAPSULE ORAL
Qty: 0 | Refills: 0 | COMMUNITY

## 2019-03-08 RX ORDER — LACTOBACILLUS ACIDOPHILUS 100MM CELL
1 CAPSULE ORAL
Qty: 0 | Refills: 0 | COMMUNITY
Start: 2019-03-08

## 2019-03-08 RX ORDER — SENNA PLUS 8.6 MG/1
2 TABLET ORAL
Qty: 0 | Refills: 0 | COMMUNITY
Start: 2019-03-08

## 2019-03-08 RX ORDER — EZETIMIBE AND SIMVASTATIN 10; 80 MG/1; MG/1
1 TABLET, FILM COATED ORAL
Qty: 0 | Refills: 0 | COMMUNITY

## 2019-03-08 RX ORDER — TOPIRAMATE 25 MG
1 TABLET ORAL
Qty: 0 | Refills: 0 | COMMUNITY
Start: 2019-03-08

## 2019-03-08 RX ORDER — SENNA PLUS 8.6 MG/1
2 TABLET ORAL
Qty: 0 | Refills: 0 | DISCHARGE
Start: 2019-03-08

## 2019-03-08 RX ORDER — TOPIRAMATE 25 MG
1 TABLET ORAL
Qty: 0 | Refills: 0 | DISCHARGE
Start: 2019-03-08

## 2019-03-08 RX ADMIN — APIXABAN 5 MILLIGRAM(S): 2.5 TABLET, FILM COATED ORAL at 06:14

## 2019-03-08 RX ADMIN — CILOSTAZOL 50 MILLIGRAM(S): 100 TABLET ORAL at 06:14

## 2019-03-08 RX ADMIN — Medication 100 MILLIGRAM(S): at 12:27

## 2019-03-08 RX ADMIN — OXYCODONE HYDROCHLORIDE 5 MILLIGRAM(S): 5 TABLET ORAL at 20:22

## 2019-03-08 RX ADMIN — PIPERACILLIN AND TAZOBACTAM 25 GRAM(S): 4; .5 INJECTION, POWDER, LYOPHILIZED, FOR SOLUTION INTRAVENOUS at 12:27

## 2019-03-08 RX ADMIN — Medication 1 TABLET(S): at 09:22

## 2019-03-08 RX ADMIN — CHLORHEXIDINE GLUCONATE 1 APPLICATION(S): 213 SOLUTION TOPICAL at 17:47

## 2019-03-08 RX ADMIN — Medication 100 MILLIGRAM(S): at 17:47

## 2019-03-08 RX ADMIN — Medication 100 MILLIGRAM(S): at 06:14

## 2019-03-08 RX ADMIN — CHLORHEXIDINE GLUCONATE 1 APPLICATION(S): 213 SOLUTION TOPICAL at 06:15

## 2019-03-08 RX ADMIN — PIPERACILLIN AND TAZOBACTAM 25 GRAM(S): 4; .5 INJECTION, POWDER, LYOPHILIZED, FOR SOLUTION INTRAVENOUS at 06:15

## 2019-03-08 RX ADMIN — Medication 12.5 MILLIGRAM(S): at 06:14

## 2019-03-08 RX ADMIN — SIMVASTATIN 40 MILLIGRAM(S): 20 TABLET, FILM COATED ORAL at 21:34

## 2019-03-08 RX ADMIN — Medication 12.5 MILLIGRAM(S): at 17:47

## 2019-03-08 RX ADMIN — Medication 1: at 12:27

## 2019-03-08 RX ADMIN — APIXABAN 5 MILLIGRAM(S): 2.5 TABLET, FILM COATED ORAL at 17:46

## 2019-03-08 RX ADMIN — OXYCODONE HYDROCHLORIDE 5 MILLIGRAM(S): 5 TABLET ORAL at 07:14

## 2019-03-08 RX ADMIN — OXYCODONE HYDROCHLORIDE 5 MILLIGRAM(S): 5 TABLET ORAL at 00:09

## 2019-03-08 RX ADMIN — OXYCODONE HYDROCHLORIDE 5 MILLIGRAM(S): 5 TABLET ORAL at 21:22

## 2019-03-08 RX ADMIN — CILOSTAZOL 50 MILLIGRAM(S): 100 TABLET ORAL at 17:46

## 2019-03-08 RX ADMIN — OXYCODONE HYDROCHLORIDE 5 MILLIGRAM(S): 5 TABLET ORAL at 06:14

## 2019-03-08 RX ADMIN — Medication 1 TABLET(S): at 17:46

## 2019-03-08 RX ADMIN — Medication 250 MILLIGRAM(S): at 09:22

## 2019-03-08 NOTE — PROGRESS NOTE ADULT - SUBJECTIVE AND OBJECTIVE BOX
Patient is a 83y old  Female who presents with a chief complaint of s/p LLE wound debridement (08 Mar 2019 09:05)    asleep awakens to verbal stimuli.  says she slept okay.  breathing okay.  No CP.  No HA.  No fevers or infections      Medication:   acetaminophen   Tablet .. 650 milliGRAM(s) Oral every 6 hours PRN  apixaban 5 milliGRAM(s) Oral every 12 hours  chlorhexidine 4% Liquid 1 Application(s) Topical every 12 hours  cilostazol 50 milliGRAM(s) Oral two times a day  dextrose 40% Gel 15 Gram(s) Oral once PRN  dextrose 50% Injectable 12.5 Gram(s) IV Push once  dextrose 50% Injectable 25 Gram(s) IV Push once  dextrose 50% Injectable 25 Gram(s) IV Push once  docusate sodium 100 milliGRAM(s) Oral two times a day  glucagon  Injectable 1 milliGRAM(s) IntraMuscular once PRN  insulin lispro (HumaLOG) corrective regimen sliding scale   SubCutaneous three times a day before meals  lactobacillus acidophilus 1 Tablet(s) Oral two times a day with meals  metoprolol tartrate 12.5 milliGRAM(s) Oral two times a day  oxyCODONE    IR 5 milliGRAM(s) Oral every 6 hours PRN  piperacillin/tazobactam IVPB. 3.375 Gram(s) IV Intermittent every 8 hours  pyridoxine 100 milliGRAM(s) Oral daily  senna 2 Tablet(s) Oral at bedtime  simvastatin 40 milliGRAM(s) Oral at bedtime  topiramate 100 milliGRAM(s) Oral two times a day  vancomycin  IVPB 750 milliGRAM(s) IV Intermittent every 12 hours      Physical exam    T(C): 36.8 (03-08-19 @ 06:11), Max: 37.1 (03-07-19 @ 17:15)  HR: 92 (03-08-19 @ 06:11) (79 - 95)  BP: 103/57 (03-08-19 @ 06:11) (96/62 - 108/62)  RR: 16 (03-08-19 @ 06:11) (16 - 18)  SpO2: 97% (03-08-19 @ 06:11) (97% - 100%)  Wt(kg): --    alert NAD  EOMI anicteric sclera  Cv s1 S2 RRR  Lungs clear B/L anteriorly      Labs                      7.9    2.62  )-----------( 341      ( 08 Mar 2019 06:15 )             26.5       03-08    138  |  106  |  10  ----------------------------<  97  3.6   |  24  |  0.75    Ca    8.6      08 Mar 2019 06:15  Mg     2.0     03-07            [id number]

## 2019-03-08 NOTE — PROGRESS NOTE ADULT - SUBJECTIVE AND OBJECTIVE BOX
Patient is a 83y old  Female who presents with a chief complaint of LLE wound dehiscence/cellulitis (07 Mar 2019 22:10)      INTERVAL HPI/OVERNIGHT EVENTS:    MEDICATIONS  (STANDING):  apixaban 5 milliGRAM(s) Oral every 12 hours  chlorhexidine 4% Liquid 1 Application(s) Topical every 12 hours  cilostazol 50 milliGRAM(s) Oral two times a day  dextrose 50% Injectable 12.5 Gram(s) IV Push once  dextrose 50% Injectable 25 Gram(s) IV Push once  dextrose 50% Injectable 25 Gram(s) IV Push once  docusate sodium 100 milliGRAM(s) Oral two times a day  insulin lispro (HumaLOG) corrective regimen sliding scale   SubCutaneous three times a day before meals  lactobacillus acidophilus 1 Tablet(s) Oral two times a day with meals  metoprolol tartrate 12.5 milliGRAM(s) Oral two times a day  piperacillin/tazobactam IVPB. 3.375 Gram(s) IV Intermittent every 8 hours  pyridoxine 100 milliGRAM(s) Oral daily  senna 2 Tablet(s) Oral at bedtime  simvastatin 40 milliGRAM(s) Oral at bedtime  topiramate 100 milliGRAM(s) Oral two times a day  vancomycin  IVPB 750 milliGRAM(s) IV Intermittent every 12 hours    MEDICATIONS  (PRN):  acetaminophen   Tablet .. 650 milliGRAM(s) Oral every 6 hours PRN Temp greater or equal to 38C (100.4F), Mild Pain (1 - 3)  dextrose 40% Gel 15 Gram(s) Oral once PRN Blood Glucose LESS THAN 70 milliGRAM(s)/deciLiter  glucagon  Injectable 1 milliGRAM(s) IntraMuscular once PRN Glucose <70 milliGRAM(s)/deciLiter  oxyCODONE    IR 5 milliGRAM(s) Oral every 6 hours PRN moderate to severe pain        Allergies    No Known Allergies    Intolerances        REVIEW OF SYSTEMS:  CARDIOVASCULAR: No chest pain, palpitations, dizziness, or leg swelling; no shortness of breath     RESPIRATORY: No cough, wheezing, chills or hemoptysis; No shortness of breath    GASTROINTESTINAL: No abdominal or epigastric pain. No nausea, vomiting, or hematemesis; No diarrhea or constipation. No melena or hematochezia.    NEUROLOGICAL: No headaches, memory loss, loss of strength, numbness      PHYSICAL EXAM:  Vital Signs Last 24 Hrs  T(C): 36.8 (08 Mar 2019 06:11), Max: 37.1 (07 Mar 2019 17:15)  T(F): 98.2 (08 Mar 2019 06:11), Max: 98.8 (07 Mar 2019 17:15)  HR: 92 (08 Mar 2019 06:11) (79 - 95)  BP: 103/57 (08 Mar 2019 06:11) (96/62 - 108/62)  BP(mean): --  RR: 16 (08 Mar 2019 06:11) (16 - 18)  SpO2: 97% (08 Mar 2019 06:11) (97% - 100%)    GENERAL: NAD, well-groomed, well-developed  HEAD:  Atraumatic, Normocephalic  EYES: EOMI, PERRLA, conjunctiva and sclera clear  NECK: Supple, No JVD, Normal thyroid  NERVOUS SYSTEM:  Alert & Oriented X3, Good concentration;  and symmetric  CHEST/LUNG: Clear to auscultation bilaterally; No rales, rhonchi, wheezing, or rubs  HEART: S1S2 irregularly iregular, with II/VI systolic murmur left sternal border  ABDOMEN: Soft, Nontender, Nondistended; Bowel sounds present        LABS:                        7.9    2.62  )-----------( 341      ( 08 Mar 2019 06:15 )             26.5     08 Mar 2019 06:15    138    |  106    |  10     ----------------------------<  97     3.6     |  24     |  0.75     Ca    8.6        08 Mar 2019 06:15        CAPILLARY BLOOD GLUCOSE      POCT Blood Glucose.: 135 mg/dL (07 Mar 2019 23:24)  POCT Blood Glucose.: 101 mg/dL (07 Mar 2019 17:54)  POCT Blood Glucose.: 156 mg/dL (07 Mar 2019 12:36)        assessment:  afib,  diabetes, POD 12    Plan:   per surgery.  D/C planning  See Heme note

## 2019-03-08 NOTE — CONSULT NOTE ADULT - SUBJECTIVE AND OBJECTIVE BOX
Patient is a 83y old  Female who presents with a chief complaint of LLE wound dehiscence/cellulitis (08 Mar 2019 10:07)      HPI:  83 y.o. female PMH DM, Afib on Eliquis, s/p fall from a stool in her kitchen two weeks ago on Feb 9 and was impaled on her back on a kitchen appliance for 48 hours before her daughter found her, hospitalized at [facility] for acute blood loss anemia due to hematoma and had a hemoglobin of 6 (baseline around 9) requiring 3 units of pRBC, seen by surgeon Dr. Dorian Lazaro and had wound VAc placed to her left back  and LLE (wound VAC LLE removed prior to discharge to rehab 4 days ago, who now presents with wound dehiscence LLE and low grade fever 99F at rehab and was started on zosyn via LUE PICC placed a day ago. Patient denies chest pain/dizziness, sob, cough, abd pain, diarrhea.  In ED, CT LLE severe diffuse subcutaneous edema in the extremities, however, no collection or abscess and no advance OM, no necrotizing infection. (24 Feb 2019 09:58)      PAST MEDICAL & SURGICAL HISTORY:  Diabetes  Atrial fibrillation, unspecified type  No significant past surgical history      Allergies  No Known Allergies        ANTIMICROBIALS:  piperacillin/tazobactam IVPB. 3.375 every 8 hours  vancomycin  IVPB 750 every 12 hours      MEDICATIONS  (STANDING):  piperacillin/tazobactam IVPB.   25 mL/Hr IV Intermittent (02-24-19 @ 02:16)    piperacillin/tazobactam IVPB.   25 mL/Hr IV Intermittent (03-08-19 @ 12:27)   25 mL/Hr IV Intermittent (03-08-19 @ 06:15)   25 mL/Hr IV Intermittent (03-07-19 @ 21:28)   25 mL/Hr IV Intermittent (03-07-19 @ 13:22)   25 mL/Hr IV Intermittent (03-07-19 @ 06:01)   25 mL/Hr IV Intermittent (03-06-19 @ 21:36)   25 mL/Hr IV Intermittent (03-06-19 @ 12:51)   25 mL/Hr IV Intermittent (03-06-19 @ 05:54)   25 mL/Hr IV Intermittent (03-05-19 @ 22:24)   25 mL/Hr IV Intermittent (03-05-19 @ 12:27)   25 mL/Hr IV Intermittent (03-05-19 @ 06:55)   25 mL/Hr IV Intermittent (03-04-19 @ 21:40)   25 mL/Hr IV Intermittent (03-04-19 @ 13:26)   25 mL/Hr IV Intermittent (03-04-19 @ 05:00)   25 mL/Hr IV Intermittent (03-03-19 @ 21:51)   25 mL/Hr IV Intermittent (03-03-19 @ 12:00)   25 mL/Hr IV Intermittent (03-03-19 @ 05:28)   25 mL/Hr IV Intermittent (03-02-19 @ 21:53)   25 mL/Hr IV Intermittent (03-02-19 @ 13:07)   25 mL/Hr IV Intermittent (03-02-19 @ 05:28)   25 mL/Hr IV Intermittent (03-01-19 @ 21:58)   25 mL/Hr IV Intermittent (03-01-19 @ 13:02)   25 mL/Hr IV Intermittent (03-01-19 @ 01:26)   25 mL/Hr IV Intermittent (02-28-19 @ 17:46)   25 mL/Hr IV Intermittent (02-28-19 @ 11:12)   25 mL/Hr IV Intermittent (02-28-19 @ 05:10)   25 mL/Hr IV Intermittent (02-27-19 @ 22:40)   25 mL/Hr IV Intermittent (02-27-19 @ 16:01)   25 mL/Hr IV Intermittent (02-27-19 @ 07:48)   25 mL/Hr IV Intermittent (02-26-19 @ 21:51)   25 mL/Hr IV Intermittent (02-26-19 @ 12:11)   25 mL/Hr IV Intermittent (02-26-19 @ 02:33)   25 mL/Hr IV Intermittent (02-25-19 @ 22:05)   25 mL/Hr IV Intermittent (02-25-19 @ 10:12)   25 mL/Hr IV Intermittent (02-24-19 @ 23:52)    vancomycin  IVPB   250 mL/Hr IV Intermittent (03-08-19 @ 09:22)   250 mL/Hr IV Intermittent (03-07-19 @ 20:09)   250 mL/Hr IV Intermittent (03-07-19 @ 11:20)   250 mL/Hr IV Intermittent (03-06-19 @ 20:16)   250 mL/Hr IV Intermittent (03-06-19 @ 09:25)   250 mL/Hr IV Intermittent (03-05-19 @ 21:18)   250 mL/Hr IV Intermittent (03-05-19 @ 11:13)   250 mL/Hr IV Intermittent (03-04-19 @ 20:02)   250 mL/Hr IV Intermittent (03-04-19 @ 11:05)   250 mL/Hr IV Intermittent (03-03-19 @ 20:26)   250 mL/Hr IV Intermittent (03-03-19 @ 10:18)   250 mL/Hr IV Intermittent (03-02-19 @ 21:20)   250 mL/Hr IV Intermittent (03-02-19 @ 10:58)   250 mL/Hr IV Intermittent (03-01-19 @ 22:30)   250 mL/Hr IV Intermittent (03-01-19 @ 11:02)   250 mL/Hr IV Intermittent (02-28-19 @ 22:04)   250 mL/Hr IV Intermittent (02-28-19 @ 09:24)   250 mL/Hr IV Intermittent (02-27-19 @ 20:36)   250 mL/Hr IV Intermittent (02-27-19 @ 13:19)   250 mL/Hr IV Intermittent (02-27-19 @ 00:23)   250 mL/Hr IV Intermittent (02-26-19 @ 10:59)   250 mL/Hr IV Intermittent (02-25-19 @ 22:09)   250 mL/Hr IV Intermittent (02-25-19 @ 08:14)        OTHER MEDS: MEDICATIONS  (STANDING):  acetaminophen   Tablet .. 650 every 6 hours PRN  apixaban 5 every 12 hours  cilostazol 50 two times a day  dextrose 40% Gel 15 once PRN  dextrose 50% Injectable 12.5 once  dextrose 50% Injectable 25 once  dextrose 50% Injectable 25 once  docusate sodium 100 two times a day  glucagon  Injectable 1 once PRN  insulin lispro (HumaLOG) corrective regimen sliding scale  three times a day before meals  metoprolol tartrate 12.5 two times a day  oxyCODONE    IR 5 every 6 hours PRN  senna 2 at bedtime  simvastatin 40 at bedtime  topiramate 100 two times a day      SOCIAL HISTORY:       FAMILY HISTORY:  Family history of chronic ischemic heart disease (Father)      REVIEW OF SYSTEMS  [  ] ROS unobtainable because:    [  ] All other systems negative except as noted below:	    Constitutional:  [ ] fever [ ] chills  [ ] weight loss  [ ] weakness  Skin:  [ ] rash [ ] phlebitis	  Eyes: [ ] icterus [ ] pain  [ ] discharge	  ENMT: [ ] sore throat  [ ] thrush [ ] ulcers [ ] exudates  Respiratory: [ ] dyspnea [ ] hemoptysis [ ] cough [ ] sputum	  Cardiovascular:  [ ] chest pain [ ] palpitations [ ] edema	  Gastrointestinal:  [ ] nausea [ ] vomiting [ ] diarrhea [ ] constipation [ ] pain	  Genitourinary:  [ ] dysuria [ ] frequency [ ] hematuria [ ] discharge [ ] flank pain  [ ] incontinence  Musculoskeletal:  [ ] myalgias [ ] arthralgias [ ] arthritis  [ ] back pain  Neurological:  [ ] headache [ ] seizures  [ ] confusion/altered mental status  Psychiatric:  [ ] anxiety [ ] depression	  Hematology/Lymphatics:  [ ] lymphadenopathy  Endocrine:  [ ] adrenal [ ] thyroid  Allergic/Immunologic:	 [ ] transplant [ ] seasonal    Vital Signs Last 24 Hrs  T(F): 98.2 (03-08-19 @ 06:11), Max: 99 (03-01-19 @ 22:16)    Vital Signs Last 24 Hrs  HR: 92 (03-08-19 @ 06:11) (79 - 95)  BP: 103/57 (03-08-19 @ 06:11) (96/62 - 108/62)  RR: 16 (03-08-19 @ 06:11)  SpO2: 97% (03-08-19 @ 06:11) (97% - 100%)  Wt(kg): --    PHYSICAL EXAM:  General: non-toxic, speaking in full sentences in NAD  HEAD/EYES: anicteric  ENT:  supple  Cardiovascular:   irregularly irregular, S1, S2, loud systolic murmur best heard at LLSB  Respiratory:  clear bilaterally  GI:  soft, non-tender, normal bowel sounds  :  no CVA tenderness   Musculoskeletal: left back ulcer well healing with clean granulation tissue, There is a LLE wound vac   Neurologic:  grossly non-focal  Skin:  no rash  Lymph: no lymphadenopathy  Psychiatric:  appropriate affect  Vascular:  no phlebitis          WBC Count: 2.62 K/uL (03-08 @ 06:15)  WBC Count: 3.71 K/uL (03-07 @ 06:45)  WBC Count: 4.08 K/uL (03-06 @ 15:08)  WBC Count: 3.98 K/uL (03-06 @ 12:08)  WBC Count: 4.02 K/uL (03-05 @ 06:55)  WBC Count: 3.40 K/uL (03-04 @ 03:00)  WBC Count: 3.48 K/uL (03-03 @ 05:42)                            7.9    2.62  )-----------( 341      ( 08 Mar 2019 06:15 )             26.5       03-08    138  |  106  |  10  ----------------------------<  97  3.6   |  24  |  0.75    Ca    8.6      08 Mar 2019 06:15  Mg     2.0     03-07        Creatinine Trend: 0.75<--, 0.71<--, 0.83<--, 0.82<--, 0.74<--, 0.76<--        MICROBIOLOGY:  Culture - Tissue with Gram Stain (02.25.19 @ 00:25)    Gram Stain Wound:   WBC White Blood Cells  QNTY CELLS IN GRAM STAIN: MODERATE (3+)  NOS^No Organisms Seen    -  Amikacin: S <=8 XAVIER    -  Aztreonam: S 4 XAVIER    -  Cefepime: S 4 XAVIER    -  Ceftazidime: S 4 XAVIER    -  Ceftriaxone: I 32 XAVIER    -  Ciprofloxacin: S <=0.5 XAVIER    -  Gentamicin: S <=1 XAVIER    -  Imipenem: S <=1 XAVIER    -  Levofloxacin: S <=1 XAVIER    -  Meropenem: S 4 XAVIER    -  Piperacillin/Tazobactam: S 16 XAVIER    -  Tobramycin: S <=2 XAVIER    -  Trimethoprim/Sulfamethoxazole: R >2/38 XAVIER    Culture - Tissue:   MANY    Specimen Source: LEG    Organism Identification: Pseudomonas putida  Staphylococcus sp.,coag neg    Organism: Pseudomonas putida    Organism: Staphylococcus sp.,coag neg    Method Type: NEGATIVE XAVIER 43          RADIOLOGY:  < from: CT Lower Extremity w/ IV Cont, Left (02.24.19 @ 01:43) >  IMPRESSION:   Severe diffuse subcutaneous edema in the visualized portions of both   lower extremities.  Correlate clinically for anasarca versus cellulitis.  No discrete abscess or drainable fluid collection.    No evidence of necrotizing infection.    No CT evidence of advanced osteomyelitis.    Severe peripheral vascular disease.  Claudication should be excluded   clinically.    < end of copied text > Patient is a 83y old  Female who presents with a chief complaint of LLE wound dehiscence/cellulitis (08 Mar 2019 10:07)      HPI:  83 y.o. female PMH DM, Afib on Eliquis, s/p fall from a stool in her kitchen two weeks ago on Feb 9 and was impaled on her back on a kitchen appliance for 48 hours before her daughter found her, hospitalized at [facility] for acute blood loss anemia due to hematoma and had a hemoglobin of 6 (baseline around 9) requiring 3 units of pRBC, seen by surgeon Dr. Dorian Lazaro and had wound VAc placed to her left back  and LLE (wound VAC LLE removed prior to discharge to rehab 4 days ago, who now presents with wound dehiscence LLE and low grade fever 99F at rehab and was started on zosyn via LUE PICC placed a day ago. Patient denies chest pain/dizziness, sob, cough, abd pain, diarrhea.  In ED, CT LLE severe diffuse subcutaneous edema in the extremities, however, no collection or abscess and no advance OM, no necrotizing infection. (24 Feb 2019 09:58)    Pt fell at home and developed hematoma which ruptured. Needed pRBC at OSH sent to rehab where she developed wound dehiscince. Had wound closure and debridement here. Grew pansensitive pseudomonas. Treated with IV zosyn for 14 days. Clinically doing well.    ID consulted for antibiotics       PAST MEDICAL & SURGICAL HISTORY:  Diabetes  Atrial fibrillation, unspecified type  No significant past surgical history      Allergies  No Known Allergies        ANTIMICROBIALS:  piperacillin/tazobactam IVPB. 3.375 every 8 hours  vancomycin  IVPB 750 every 12 hours      MEDICATIONS  (STANDING):  piperacillin/tazobactam IVPB.   25 mL/Hr IV Intermittent (02-24-19 @ 02:16)    piperacillin/tazobactam IVPB.   25 mL/Hr IV Intermittent (03-08-19 @ 12:27)   25 mL/Hr IV Intermittent (03-08-19 @ 06:15)   25 mL/Hr IV Intermittent (03-07-19 @ 21:28)   25 mL/Hr IV Intermittent (03-07-19 @ 13:22)   25 mL/Hr IV Intermittent (03-07-19 @ 06:01)   25 mL/Hr IV Intermittent (03-06-19 @ 21:36)   25 mL/Hr IV Intermittent (03-06-19 @ 12:51)   25 mL/Hr IV Intermittent (03-06-19 @ 05:54)   25 mL/Hr IV Intermittent (03-05-19 @ 22:24)   25 mL/Hr IV Intermittent (03-05-19 @ 12:27)   25 mL/Hr IV Intermittent (03-05-19 @ 06:55)   25 mL/Hr IV Intermittent (03-04-19 @ 21:40)   25 mL/Hr IV Intermittent (03-04-19 @ 13:26)   25 mL/Hr IV Intermittent (03-04-19 @ 05:00)   25 mL/Hr IV Intermittent (03-03-19 @ 21:51)   25 mL/Hr IV Intermittent (03-03-19 @ 12:00)   25 mL/Hr IV Intermittent (03-03-19 @ 05:28)   25 mL/Hr IV Intermittent (03-02-19 @ 21:53)   25 mL/Hr IV Intermittent (03-02-19 @ 13:07)   25 mL/Hr IV Intermittent (03-02-19 @ 05:28)   25 mL/Hr IV Intermittent (03-01-19 @ 21:58)   25 mL/Hr IV Intermittent (03-01-19 @ 13:02)   25 mL/Hr IV Intermittent (03-01-19 @ 01:26)   25 mL/Hr IV Intermittent (02-28-19 @ 17:46)   25 mL/Hr IV Intermittent (02-28-19 @ 11:12)   25 mL/Hr IV Intermittent (02-28-19 @ 05:10)   25 mL/Hr IV Intermittent (02-27-19 @ 22:40)   25 mL/Hr IV Intermittent (02-27-19 @ 16:01)   25 mL/Hr IV Intermittent (02-27-19 @ 07:48)   25 mL/Hr IV Intermittent (02-26-19 @ 21:51)   25 mL/Hr IV Intermittent (02-26-19 @ 12:11)   25 mL/Hr IV Intermittent (02-26-19 @ 02:33)   25 mL/Hr IV Intermittent (02-25-19 @ 22:05)   25 mL/Hr IV Intermittent (02-25-19 @ 10:12)   25 mL/Hr IV Intermittent (02-24-19 @ 23:52)    vancomycin  IVPB   250 mL/Hr IV Intermittent (03-08-19 @ 09:22)   250 mL/Hr IV Intermittent (03-07-19 @ 20:09)   250 mL/Hr IV Intermittent (03-07-19 @ 11:20)   250 mL/Hr IV Intermittent (03-06-19 @ 20:16)   250 mL/Hr IV Intermittent (03-06-19 @ 09:25)   250 mL/Hr IV Intermittent (03-05-19 @ 21:18)   250 mL/Hr IV Intermittent (03-05-19 @ 11:13)   250 mL/Hr IV Intermittent (03-04-19 @ 20:02)   250 mL/Hr IV Intermittent (03-04-19 @ 11:05)   250 mL/Hr IV Intermittent (03-03-19 @ 20:26)   250 mL/Hr IV Intermittent (03-03-19 @ 10:18)   250 mL/Hr IV Intermittent (03-02-19 @ 21:20)   250 mL/Hr IV Intermittent (03-02-19 @ 10:58)   250 mL/Hr IV Intermittent (03-01-19 @ 22:30)   250 mL/Hr IV Intermittent (03-01-19 @ 11:02)   250 mL/Hr IV Intermittent (02-28-19 @ 22:04)   250 mL/Hr IV Intermittent (02-28-19 @ 09:24)   250 mL/Hr IV Intermittent (02-27-19 @ 20:36)   250 mL/Hr IV Intermittent (02-27-19 @ 13:19)   250 mL/Hr IV Intermittent (02-27-19 @ 00:23)   250 mL/Hr IV Intermittent (02-26-19 @ 10:59)   250 mL/Hr IV Intermittent (02-25-19 @ 22:09)   250 mL/Hr IV Intermittent (02-25-19 @ 08:14)        OTHER MEDS: MEDICATIONS  (STANDING):  acetaminophen   Tablet .. 650 every 6 hours PRN  apixaban 5 every 12 hours  cilostazol 50 two times a day  dextrose 40% Gel 15 once PRN  dextrose 50% Injectable 12.5 once  dextrose 50% Injectable 25 once  dextrose 50% Injectable 25 once  docusate sodium 100 two times a day  glucagon  Injectable 1 once PRN  insulin lispro (HumaLOG) corrective regimen sliding scale  three times a day before meals  metoprolol tartrate 12.5 two times a day  oxyCODONE    IR 5 every 6 hours PRN  senna 2 at bedtime  simvastatin 40 at bedtime  topiramate 100 two times a day      SOCIAL HISTORY:     Denies toxic habits     FAMILY HISTORY:  Family history of chronic ischemic heart disease (Father)      REVIEW OF SYSTEMS  [  ] ROS unobtainable because:    [X  ] All other systems negative except as noted below:	    Constitutional:  [ ] fever [ ] chills  [ ] weight loss  [ ] weakness  Skin:  [ ] rash [ ] phlebitis	  Eyes: [ ] icterus [ ] pain  [ ] discharge	  ENMT: [ ] sore throat  [ ] thrush [ ] ulcers [ ] exudates  Respiratory: [ ] dyspnea [ ] hemoptysis [ ] cough [ ] sputum	  Cardiovascular:  [ ] chest pain [ ] palpitations [ ] edema	  Gastrointestinal:  [ ] nausea [ ] vomiting [ ] diarrhea [ ] constipation [ ] pain	  Genitourinary:  [ ] dysuria [ ] frequency [ ] hematuria [ ] discharge [ ] flank pain  [ ] incontinence  Musculoskeletal:  [ ] myalgias [ ] arthralgias [ ] arthritis  [ X] leg  pain  Neurological:  [ ] headache [ ] seizures  [ ] confusion/altered mental status  Psychiatric:  [ ] anxiety [ ] depression	  Hematology/Lymphatics:  [ ] lymphadenopathy  Endocrine:  [ ] adrenal [ ] thyroid  Allergic/Immunologic:	 [ ] transplant [ ] seasonal    Vital Signs Last 24 Hrs  T(F): 98.2 (03-08-19 @ 06:11), Max: 99 (03-01-19 @ 22:16)    Vital Signs Last 24 Hrs  HR: 92 (03-08-19 @ 06:11) (79 - 95)  BP: 103/57 (03-08-19 @ 06:11) (96/62 - 108/62)  RR: 16 (03-08-19 @ 06:11)  SpO2: 97% (03-08-19 @ 06:11) (97% - 100%)  Wt(kg): --    PHYSICAL EXAM:  General: non-toxic, speaking in full sentences in NAD  HEAD/EYES: anicteric  ENT:  supple  Cardiovascular:   irregularly irregular, S1, S2, loud systolic murmur best heard at LLSB  Respiratory:  clear bilaterally  GI:  soft, non-tender, normal bowel sounds  :  no CVA tenderness   Musculoskeletal: left back ulcer well healing with clean granulation tissue, There is a LLE wound vac   Neurologic:  grossly non-focal  Skin:  no rash  Lymph: no lymphadenopathy  Psychiatric:  appropriate affect  Vascular:  no phlebitis          WBC Count: 2.62 K/uL (03-08 @ 06:15)  WBC Count: 3.71 K/uL (03-07 @ 06:45)  WBC Count: 4.08 K/uL (03-06 @ 15:08)  WBC Count: 3.98 K/uL (03-06 @ 12:08)  WBC Count: 4.02 K/uL (03-05 @ 06:55)  WBC Count: 3.40 K/uL (03-04 @ 03:00)  WBC Count: 3.48 K/uL (03-03 @ 05:42)                            7.9    2.62  )-----------( 341      ( 08 Mar 2019 06:15 )             26.5       03-08    138  |  106  |  10  ----------------------------<  97  3.6   |  24  |  0.75    Ca    8.6      08 Mar 2019 06:15  Mg     2.0     03-07        Creatinine Trend: 0.75<--, 0.71<--, 0.83<--, 0.82<--, 0.74<--, 0.76<--        MICROBIOLOGY:  Culture - Tissue with Gram Stain (02.25.19 @ 00:25)    Gram Stain Wound:   WBC White Blood Cells  QNTY CELLS IN GRAM STAIN: MODERATE (3+)  NOS^No Organisms Seen    -  Amikacin: S <=8 XAVIER    -  Aztreonam: S 4 XAVIER    -  Cefepime: S 4 XAVIER    -  Ceftazidime: S 4 XAVIER    -  Ceftriaxone: I 32 XAVIER    -  Ciprofloxacin: S <=0.5 XAVIER    -  Gentamicin: S <=1 XAVIER    -  Imipenem: S <=1 XAVIER    -  Levofloxacin: S <=1 XAVIER    -  Meropenem: S 4 XAVIER    -  Piperacillin/Tazobactam: S 16 XAVIER    -  Tobramycin: S <=2 XAVIER    -  Trimethoprim/Sulfamethoxazole: R >2/38 XAVIER    Culture - Tissue:   MANY    Specimen Source: LEG    Organism Identification: Pseudomonas putida  Staphylococcus sp.,coag neg    Organism: Pseudomonas putida    Organism: Staphylococcus sp.,coag neg    Method Type: NEGATIVE XAVIER 43          RADIOLOGY:  < from: CT Lower Extremity w/ IV Cont, Left (02.24.19 @ 01:43) >  IMPRESSION:   Severe diffuse subcutaneous edema in the visualized portions of both   lower extremities.  Correlate clinically for anasarca versus cellulitis.  No discrete abscess or drainable fluid collection.    No evidence of necrotizing infection.    No CT evidence of advanced osteomyelitis.    Severe peripheral vascular disease.  Claudication should be excluded   clinically.    < end of copied text >

## 2019-03-08 NOTE — PROGRESS NOTE ADULT - ASSESSMENT
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  Has component of AOCd with infection and recent bleeding due to trauma.  hgb low but steady.  Transfusional support as needed.  LUIS ANTONIO and hemolytic labs ordered f/u results.    leucopenia lower, chronic.  Suspect due to MDS.  Monitor ANC and if goes below 1000 would consider G-CSF in setting of infection.      Thrombocytosis better today.

## 2019-03-08 NOTE — PROGRESS NOTE ADULT - SUBJECTIVE AND OBJECTIVE BOX
Pt doing well, clinically healing LLE wound well. Granulation tissue forming and there is no obvious superimposed infection or ongoing necrotic areas.   No extending cellulitis  Pt tolerating ambulation with PT/OT - great appreciated.  There are no movement restrictions from my point of view however plastic surgery may want limited motion after possibly skin grafting/debridement.   Optimizing nutrition  Keep legs elevated when in bed, edema control   At this time, general surgery will sign off. There is no need for follow up from my end as she will see plastic surgery and wound care for her need and an additional office visit will not be in her best interest as she is dependent on many factors for transportation.   Please call with any questions or concerns.    Mejia Perry MD  Acute Care Surgery   [phone] Pt doing well, clinically healing LLE wound well. Granulation tissue forming and there is no obvious superimposed infection or ongoing necrotic areas.   No extending cellulitis  Pt tolerating ambulation with PT/OT - great appreciated.  There are no movement restrictions from my point of view however plastic surgery may want limited motion after possibly skin grafting/debridement.   Optimizing nutrition  Keep legs elevated when in bed, edema control   At this time, general surgery will sign off. There is no need for follow up from my end as she will see plastic surgery, vascular surgery and wound care for her need and an additional office visit will not be in her best interest as she is dependent on many factors for transportation.   Please call with any questions or concerns.    Mejia Perry MD  Acute Care Surgery   [phone] I saw and examined the patient at bedside.   Pt doing well, clinically healing LLE wound well. Granulation tissue forming and there is no obvious superimposed infection or ongoing necrotic areas.   No extending cellulitis  Pt tolerating ambulation with PT/OT - great appreciated.  There are no movement restrictions from my point of view however plastic surgery may want limited motion after possibly skin grafting/debridement.   Optimizing nutrition  Keep legs elevated when in bed, edema control   At this time, general surgery will sign off. There is no need for follow up from my end as she will see plastic surgery, vascular surgery and wound care for her need and an additional office visit will not be in her best interest as she is dependent on many factors for transportation.   Please call with any questions or concerns.    Mejia Perry MD  Acute Care Surgery   [phone]

## 2019-03-08 NOTE — CONSULT NOTE ADULT - REASON FOR ADMISSION
LLE wound dehiscence/cellulitis

## 2019-03-08 NOTE — CONSULT NOTE ADULT - ASSESSMENT
83y old  Female of LLE wound traumatic/surgical wound s/p NSTI infection with psuedomonas on her left leg wound  PMH DM, Afib on Eliquis  s/p acute blood loss anemia   s/m2ftgcm of pRBC,,  s/p OR for debridement on 2/25, currently with vac on leg 125 and vac( to wall suction) on her left back  check chest xray? rib injury  would recommend : drop left leg  vac suction to 100 mmhg- done, use adaptic on wound base, cleansing with normal saline , agree with ace wrap  would assess for arterial flow, brandie, currently no evidence of ischemia though ct shows severe disease, needs  vascular assessment  left knee effusion, no fracture seen on ct  pelvic film - left buttock hematoma  check venous doppler for valve insufficiency doubt dvt- on lovenox  Physical therapy eval- gait and dm neuropathy  nutritional eval- dm s/p sepsis event, hemorrhage and trauma, protein supplement mvi
83F Hx Afib on eliquis, DM presents with dehiscence of LLE wound with cellulitis.     - OR today for debridement of Left leg  - NPO  - C/w antibiotics  - IV fluids  - Hold eliquis  - Recommend plastic surgery consult as leg will likely need complex closure moving forward.     [id number]
83F with PMH of afib (on Eliquis, currently held), DM, and diabetic neuropathy admitted after a fall with nonhealing LLE wound, now with wound VAC and plan for possible skin graft with PRS. CT on admission shows severe atherosclerotic disease in the left popliteal region.    - No vascular surgery intervention at this time  - Recommend PARVIN/PVRs to evaluate for PAD (ordered)  - Continue local wound care per wound care team  - Discussed with attending, Dr. Caceres    Vascular surgery  Pager [phone]
A/P: 83yoF with left back wound vac and left leg wound vac s/p debridement by gen surg 2/25, plastic consulted for definitive closure of left leg wound.       Will take down left leg wound vac tomorrow to evaluate wound  From then on wound care team to change wound vac MWF  Recommend primary team consults Dr. Berrios of wound care for prolonged wound care as outpatient  Plan for Discharge to rehab/home with wound vac with plan for outpatient STSG closure of leg wound with Dr. Smith  Coordinate with case management for VNS for wound vac care  Back wound vac management per primary team  Appreciate Gen surg recs  F/U with Dr. Smith 7-10days from discharge  Neuro: Pain control  Resp: IS  GI: Reg diet, bowel regimen  MSK: OOB to chair, WBAT, PT/OT, ACE wrap applied to left leg to decrease swelling  Heme: DVT PPX per primary team currently on lovenox  ID: c/w vanco and zosyn, follow-up OR cultures    [id number]
Patient with a macrocytic anemia.  She reports a chronic anemia.  She has bene requiring PRBC transfusions lately due to trauma and wound bleeding.  Iron studies were done and are non-specific.  Not b12 or folate deficient.  Normal kidney function and bilirubin.  Doubt hemolysis.  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  She also has had a chronic leucopenia which is consistent with that.  She also has a mild thrombocytosis which could be reactive but also could be due to underlying suspected MDS picture.  For now would suggest managing supportively.  Hgb low and she does expect to go to rehab any day so perhaps a unit of blood prior to transfer would be reasonable.  Ultimately, she will need a bone marrow biopsy and suggested that we do that as an out-pt once she is out of rehab.  She does agree and was given the office contact information.
83 y.o. female PMH DM, Afib on Eliquis, s/p fall from a stool in her kitchen two weeks ago on Feb 9 and was impaled on her back on a kitchen appliance for 48 hours before her daughter found her, hospitalized at [facility] for acute blood loss anemia due to hematoma and had a hemoglobin of 6 (baseline around 9) requiring 3 units of pRBC, seen by surgeon Dr. Dorian Lazaro and had wound VAc placed to her left back  and LLE (wound VAC LLE removed prior to discharge to rehab 4 days ago, who now presents with wound dehiscence LLE and low grade fever 99F at rehab and was started on zosyn   Patient with wound dehiscence  s/p zosyn 14 day course  Clinically no s/s residual infection  No s/s other nosocomial infection    Recommendations:   1.	Stop antibiotics and observe  2.	d/c PICC line if no other indication for keeping it f  3.	excellent wound care needed   4.	continue wound vac to help with healing

## 2019-03-08 NOTE — CONSULT NOTE ADULT - ATTENDING COMMENTS
Patient seen and examined  Above verified  Agree with above unless as noted below.  83 y.o. female PMH DM, Afib on Eliquis, s/p fall from a stool in her kitchen two weeks ago on Feb 9 and was impaled on her back on a kitchen appliance for 48 hours before her daughter found her, hospitalized at [facility] for acute blood loss anemia due to hematoma and had a hemoglobin of 6 (baseline around 9) requiring 3 units of pRBC, seen by surgeon Dr. Dorian Lazaro and had wound VAc placed to her left back  and LLE (wound VAC LLE removed prior to discharge to rehab 4 days ago, who now presents with wound dehiscence LLE and low grade fever 99F at rehab and was started on zosyn   Patient with wound dehiscence  s/p zosyn 14 day course  Clinically no s/s residual infection  No s/s other nosocomial infection  Would recommend Dc antimicrobials and observe  Would also recommend Dc PICC line (not getting any other IV)  Will tailor plan for ID issues  per course,results.Will defer to primary team on management of other issues.  ID service  will cover patient over weekend.Please call ID fellow on call if issues or questions.  Will d/w med team
I saw and examined the patient and agree with the above note.    T81.31XA Disruption or dehiscence of closure of skin, initial encounter   -Wound edges necrotic with devascularized portions as well as fibrinous exudate at base   -debride in OR  L03.116 Cellulitis of left lower extremity  -Cellulitic areas surrounding the wound with bogginess of the underlying tissue, tender, calor  -debride in OR and apply VAC  E87.6 Hypokalemia  -replete PRN, include magnesium    I spent 60min reviewing history, data, images and in discussion/coordination of care. Greater than 50% of my time was spent in face-to-face discussion with the patient regarding antibiotics and need for operative debridement as well as in discussion regarding the need for future plastic surgery interventions     Mejia Perry MD (Cell: [phone])  Acute and Critical Care Surgery    The Acute Care Surgery (B Team) Attending Group Practice:  Dr. Neha Mckeon, Dr. Benito Hyde, Dr. Elizabeth Campa, Dr. Mejia Perry    Urgent issues - spectra [id number] or [id number]  Nonurgent issues - [phone]  Patient appointments or after hours - [phone]
I performed a history and physical exam of the patient and discussed  the findings and plan with the house officer. I reviewed the resident note and agree with the findings and plan

## 2019-03-09 LAB
ANION GAP SERPL CALC-SCNC: 9 MMO/L — SIGNIFICANT CHANGE UP (ref 7–14)
BASOPHILS # BLD AUTO: 0.01 K/UL — SIGNIFICANT CHANGE UP (ref 0–0.2)
BASOPHILS NFR BLD AUTO: 0.3 % — SIGNIFICANT CHANGE UP (ref 0–2)
BUN SERPL-MCNC: 11 MG/DL — SIGNIFICANT CHANGE UP (ref 7–23)
CALCIUM SERPL-MCNC: 8.8 MG/DL — SIGNIFICANT CHANGE UP (ref 8.4–10.5)
CHLORIDE SERPL-SCNC: 108 MMOL/L — HIGH (ref 98–107)
CO2 SERPL-SCNC: 23 MMOL/L — SIGNIFICANT CHANGE UP (ref 22–31)
CREAT SERPL-MCNC: 0.71 MG/DL — SIGNIFICANT CHANGE UP (ref 0.5–1.3)
EOSINOPHIL # BLD AUTO: 0.14 K/UL — SIGNIFICANT CHANGE UP (ref 0–0.5)
EOSINOPHIL NFR BLD AUTO: 4.4 % — SIGNIFICANT CHANGE UP (ref 0–6)
GLUCOSE BLDC GLUCOMTR-MCNC: 100 MG/DL — HIGH (ref 70–99)
GLUCOSE BLDC GLUCOMTR-MCNC: 114 MG/DL — HIGH (ref 70–99)
GLUCOSE BLDC GLUCOMTR-MCNC: 132 MG/DL — HIGH (ref 70–99)
GLUCOSE BLDC GLUCOMTR-MCNC: 150 MG/DL — HIGH (ref 70–99)
GLUCOSE SERPL-MCNC: 109 MG/DL — HIGH (ref 70–99)
HCT VFR BLD CALC: 26.2 % — LOW (ref 34.5–45)
HGB BLD-MCNC: 7.5 G/DL — LOW (ref 11.5–15.5)
IMM GRANULOCYTES NFR BLD AUTO: 0 % — SIGNIFICANT CHANGE UP (ref 0–1.5)
LYMPHOCYTES # BLD AUTO: 1.04 K/UL — SIGNIFICANT CHANGE UP (ref 1–3.3)
LYMPHOCYTES # BLD AUTO: 32.5 % — SIGNIFICANT CHANGE UP (ref 13–44)
MAGNESIUM SERPL-MCNC: 2.1 MG/DL — SIGNIFICANT CHANGE UP (ref 1.6–2.6)
MCHC RBC-ENTMCNC: 28.6 % — LOW (ref 32–36)
MCHC RBC-ENTMCNC: 29 PG — SIGNIFICANT CHANGE UP (ref 27–34)
MCV RBC AUTO: 101.2 FL — HIGH (ref 80–100)
MONOCYTES # BLD AUTO: 0.45 K/UL — SIGNIFICANT CHANGE UP (ref 0–0.9)
MONOCYTES NFR BLD AUTO: 14.1 % — HIGH (ref 2–14)
NEUTROPHILS # BLD AUTO: 1.56 K/UL — LOW (ref 1.8–7.4)
NEUTROPHILS NFR BLD AUTO: 48.7 % — SIGNIFICANT CHANGE UP (ref 43–77)
NRBC # FLD: 0 K/UL — LOW (ref 25–125)
PLATELET # BLD AUTO: 365 K/UL — SIGNIFICANT CHANGE UP (ref 150–400)
PMV BLD: 9.2 FL — SIGNIFICANT CHANGE UP (ref 7–13)
POTASSIUM SERPL-MCNC: 3.8 MMOL/L — SIGNIFICANT CHANGE UP (ref 3.5–5.3)
POTASSIUM SERPL-SCNC: 3.8 MMOL/L — SIGNIFICANT CHANGE UP (ref 3.5–5.3)
RBC # BLD: 2.59 M/UL — LOW (ref 3.8–5.2)
RBC # FLD: 19.7 % — HIGH (ref 10.3–14.5)
SODIUM SERPL-SCNC: 140 MMOL/L — SIGNIFICANT CHANGE UP (ref 135–145)
WBC # BLD: 3.2 K/UL — LOW (ref 3.8–10.5)
WBC # FLD AUTO: 3.2 K/UL — LOW (ref 3.8–10.5)

## 2019-03-09 PROCEDURE — 99232 SBSQ HOSP IP/OBS MODERATE 35: CPT | Mod: GC

## 2019-03-09 RX ADMIN — Medication 100 MILLIGRAM(S): at 06:26

## 2019-03-09 RX ADMIN — Medication 1 TABLET(S): at 08:25

## 2019-03-09 RX ADMIN — Medication 100 MILLIGRAM(S): at 18:17

## 2019-03-09 RX ADMIN — Medication 1 TABLET(S): at 18:16

## 2019-03-09 RX ADMIN — APIXABAN 5 MILLIGRAM(S): 2.5 TABLET, FILM COATED ORAL at 06:26

## 2019-03-09 RX ADMIN — OXYCODONE HYDROCHLORIDE 5 MILLIGRAM(S): 5 TABLET ORAL at 09:25

## 2019-03-09 RX ADMIN — CILOSTAZOL 50 MILLIGRAM(S): 100 TABLET ORAL at 18:16

## 2019-03-09 RX ADMIN — SIMVASTATIN 40 MILLIGRAM(S): 20 TABLET, FILM COATED ORAL at 21:29

## 2019-03-09 RX ADMIN — OXYCODONE HYDROCHLORIDE 5 MILLIGRAM(S): 5 TABLET ORAL at 21:59

## 2019-03-09 RX ADMIN — CILOSTAZOL 50 MILLIGRAM(S): 100 TABLET ORAL at 06:26

## 2019-03-09 RX ADMIN — Medication 12.5 MILLIGRAM(S): at 18:17

## 2019-03-09 RX ADMIN — CHLORHEXIDINE GLUCONATE 1 APPLICATION(S): 213 SOLUTION TOPICAL at 06:27

## 2019-03-09 RX ADMIN — Medication 12.5 MILLIGRAM(S): at 06:26

## 2019-03-09 RX ADMIN — OXYCODONE HYDROCHLORIDE 5 MILLIGRAM(S): 5 TABLET ORAL at 08:25

## 2019-03-09 RX ADMIN — Medication 100 MILLIGRAM(S): at 11:52

## 2019-03-09 RX ADMIN — CHLORHEXIDINE GLUCONATE 1 APPLICATION(S): 213 SOLUTION TOPICAL at 18:18

## 2019-03-09 RX ADMIN — OXYCODONE HYDROCHLORIDE 5 MILLIGRAM(S): 5 TABLET ORAL at 21:29

## 2019-03-09 RX ADMIN — APIXABAN 5 MILLIGRAM(S): 2.5 TABLET, FILM COATED ORAL at 18:16

## 2019-03-09 NOTE — PROGRESS NOTE ADULT - ASSESSMENT
83 y.o. female PMH DM, Afib on Eliquis, s/p fall from a stool in her kitchen two weeks ago on Feb 9 and was impaled on her back on a kitchen appliance for 48 hours before her daughter found her, hospitalized at [facility] for acute blood loss anemia due to hematoma and had a hemoglobin of 6 (baseline around 9) requiring 3 units of pRBC, seen by surgeon Dr. Dorian Lazaro and had wound VAc placed to her left back  and LLE (wound VAC LLE removed prior to discharge to rehab 4 days ago, who now presents with wound dehiscence LLE and low grade fever 99F at rehab and was started on zosyn   Patient with wound dehiscence  s/p zosyn 14 day course  Clinically no s/s residual infection  No s/s other nosocomial infection    Recommendations:   1.	Continue to monitor off of antibiotics  2.	d/c PICC line prior to discharge  3.	excellent wound care needed     continue wound vac to help with healing 83 y.o. female PMH DM, Afib on Eliquis, s/p fall from a stool in her kitchen two weeks ago on Feb 9 and was impaled on her back on a kitchen appliance for 48 hours before her daughter found her, hospitalized at [facility] for acute blood loss anemia due to hematoma and had a hemoglobin of 6 (baseline around 9) requiring 3 units of pRBC, seen by surgeon Dr. Dorian Lazaro and had wound VAc placed to her left back  and LLE (wound VAC LLE removed prior to discharge to rehab 4 days ago, who now presents with wound dehiscence LLE and low grade fever 99F at rehab and was started on zosyn   Patient with wound dehiscence  s/p zosyn 14 day course  Clinically no s/s residual infection  No s/s other nosocomial infection  Leucopenic but not neutropenic.     Recommendations:   1.	Continue to monitor off of antibiotics  2.	d/c PICC line prior to discharge  3.	excellent wound care needed    continue wound vac to help with healing

## 2019-03-09 NOTE — PROGRESS NOTE ADULT - SUBJECTIVE AND OBJECTIVE BOX
Patient is a 83y old  Female who presents with a chief complaint of LLE wound dehiscence/cellulitis (08 Mar 2019 14:54)      INTERVAL HPI/OVERNIGHT EVENTS: seen by ID    MEDICATIONS  (STANDING):  apixaban 5 milliGRAM(s) Oral every 12 hours  chlorhexidine 4% Liquid 1 Application(s) Topical every 12 hours  cilostazol 50 milliGRAM(s) Oral two times a day  dextrose 50% Injectable 12.5 Gram(s) IV Push once  dextrose 50% Injectable 25 Gram(s) IV Push once  dextrose 50% Injectable 25 Gram(s) IV Push once  docusate sodium 100 milliGRAM(s) Oral two times a day  insulin lispro (HumaLOG) corrective regimen sliding scale   SubCutaneous three times a day before meals  lactobacillus acidophilus 1 Tablet(s) Oral two times a day with meals  metoprolol tartrate 12.5 milliGRAM(s) Oral two times a day  pyridoxine 100 milliGRAM(s) Oral daily  senna 2 Tablet(s) Oral at bedtime  simvastatin 40 milliGRAM(s) Oral at bedtime  topiramate 100 milliGRAM(s) Oral two times a day    MEDICATIONS  (PRN):  acetaminophen   Tablet .. 650 milliGRAM(s) Oral every 6 hours PRN Temp greater or equal to 38C (100.4F), Mild Pain (1 - 3)  dextrose 40% Gel 15 Gram(s) Oral once PRN Blood Glucose LESS THAN 70 milliGRAM(s)/deciLiter  glucagon  Injectable 1 milliGRAM(s) IntraMuscular once PRN Glucose <70 milliGRAM(s)/deciLiter  oxyCODONE    IR 5 milliGRAM(s) Oral every 6 hours PRN moderate to severe pain        Orders last 24 hours:  POCT  Blood Glucose (03-08-19 @ 08:49)  Special Hematology Pathology (03-08-19 @ 09:05)  POCT  Blood Glucose (03-08-19 @ 12:22)  POCT  Blood Glucose (03-08-19 @ 17:33)  POCT  Blood Glucose (03-08-19 @ 22:58)      Allergies    No Known Allergies    Intolerances        REVIEW OF SYSTEMS:  CARDIOVASCULAR: No chest pain, palpitations, dizziness, or leg swelling; no shortness of breath     RESPIRATORY: No cough, wheezing, chills or hemoptysis; No shortness of breath    GASTROINTESTINAL: No abdominal or epigastric pain. No nausea, vomiting, or hematemesis; No diarrhea or constipation. No melena or hematochezia.    NEUROLOGICAL: No headaches, memory loss, loss of strength, numbness      PHYSICAL EXAM:  Vital Signs Last 24 Hrs  T(C): 36.6 (09 Mar 2019 06:24), Max: 36.8 (08 Mar 2019 17:44)  T(F): 97.8 (09 Mar 2019 06:24), Max: 98.3 (08 Mar 2019 17:44)  HR: 89 (09 Mar 2019 06:24) (80 - 89)  BP: 100/62 (09 Mar 2019 06:24) (93/41 - 102/56)  BP(mean): --  RR: 16 (09 Mar 2019 06:24) (16 - 18)  SpO2: 99% (09 Mar 2019 06:24) (96% - 99%)    GENERAL: NAD, well-groomed, well-developed  HEAD:  Atraumatic, Normocephalic  EYES: EOMI, PERRLA, conjunctiva and sclera clear  NECK: Supple, No JVD, Normal thyroid  NERVOUS SYSTEM:  Alert & Oriented X3, Good concentration;  and symmetric  CHEST/LUNG: Clear to auscultation bilaterally; No rales, rhonchi, wheezing, or rubs  HEART: S1S2 irregularly irregular, with II/VI systolic murmur left sternal border, without gallop  ABDOMEN: Soft, Nontender, Nondistended; Bowel sounds present        LABS:                        7.5    3.20  )-----------( 365      ( 09 Mar 2019 06:30 )             26.2     09 Mar 2019 06:30    140    |  108    |  11     ----------------------------<  109    3.8     |  23     |  0.71     Ca    8.8        09 Mar 2019 06:30  Mg     2.1       09 Mar 2019 06:30        CAPILLARY BLOOD GLUCOSE      POCT Blood Glucose.: 136 mg/dL (08 Mar 2019 22:54)  POCT Blood Glucose.: 114 mg/dL (08 Mar 2019 17:20)  POCT Blood Glucose.: 169 mg/dL (08 Mar 2019 12:20)  POCT Blood Glucose.: 114 mg/dL (08 Mar 2019 08:48)          Consultant(s) Notes Reviewed:  ID      assessment:  stable.  If remains afebrile, d/c picc line.   D/C planning

## 2019-03-09 NOTE — PROGRESS NOTE ADULT - ATTENDING COMMENTS
Rashmi Pritchard  Pager: [phone]. If no response or past 5 pm call [phone].    Will sign off, please call with questions.
I have personally  seen and examined the patient today and have noted the findings and formulated the plan of care.
I have seen and examined the patient today and agree with  the  evaluation, assessment and plan of the surgical house officer
I have personally  seen and examined the patient today and have noted the findings and formulated the plan of care.

## 2019-03-09 NOTE — PROGRESS NOTE ADULT - SUBJECTIVE AND OBJECTIVE BOX
Follow Up:  LLE wound dehiscence/cellulitis    Interval History/ROS: No chills or fevers overnight. Wound vac applied to LLE wound. Denies any significant pain to LLE.     Allergies  No Known Allergies        ANTIMICROBIALS:      OTHER MEDS:  MEDICATIONS  (STANDING):  acetaminophen   Tablet .. 650 every 6 hours PRN  apixaban 5 every 12 hours  cilostazol 50 two times a day  dextrose 40% Gel 15 once PRN  dextrose 50% Injectable 12.5 once  dextrose 50% Injectable 25 once  dextrose 50% Injectable 25 once  docusate sodium 100 two times a day  glucagon  Injectable 1 once PRN  insulin lispro (HumaLOG) corrective regimen sliding scale  three times a day before meals  metoprolol tartrate 12.5 two times a day  oxyCODONE    IR 5 every 6 hours PRN  senna 2 at bedtime  simvastatin 40 at bedtime  topiramate 100 two times a day      Vital Signs Last 24 Hrs  T(C): 36.6 (09 Mar 2019 06:24), Max: 36.8 (08 Mar 2019 17:44)  T(F): 97.8 (09 Mar 2019 06:24), Max: 98.3 (08 Mar 2019 17:44)  HR: 89 (09 Mar 2019 06:24) (80 - 89)  BP: 100/62 (09 Mar 2019 06:24) (93/41 - 102/56)  BP(mean): --  RR: 16 (09 Mar 2019 06:24) (16 - 18)  SpO2: 99% (09 Mar 2019 06:24) (96% - 99%)    PHYSICAL EXAM:  General: non-toxic, speaking in full sentences in NAD  HEAD/EYES: anicteric  ENT:  supple  Cardiovascular:  tachycardic and irregularly irregular, S1, S2, loud systolic murmur best heard at LLSB  Respiratory:  clear bilaterally  GI:  soft, non-tender, normal bowel sounds  :  no CVA tenderness   Musculoskeletal: left back ulcer well healing with clean granulation tissue, There is a LLE wound vac   Neurologic:  grossly non-focal  Skin:  no rash  Lymph: no lymphadenopathy  Psychiatric:  appropriate affect  Vascular:  no phlebitis, +LUE PICC (No surrounding erythema, drainage or tenderness to palpation)                          7.5    3.20  )-----------( 365      ( 09 Mar 2019 06:30 )             26.2       03-09    140  |  108<H>  |  11  ----------------------------<  109<H>  3.8   |  23  |  0.71    Ca    8.8      09 Mar 2019 06:30  Mg     2.1     03-09            MICROBIOLOGY:  v  LEG  02-25-19 --  --  Pseudomonas putida  Staphylococcus sp.,coag neg      RADIOLOGY:    RADIOLOGY:  < from: CT Lower Extremity w/ IV Cont, Left (02.24.19 @ 01:43) >  IMPRESSION:   Severe diffuse subcutaneous edema in the visualized portions of both   lower extremities.  Correlate clinically for anasarca versus cellulitis.  No discrete abscess or drainable fluid collection.    No evidence of necrotizing infection.    No CT evidence of advanced osteomyelitis.    Severe peripheral vascular disease.  Claudication should be excluded   clinically.    < end of copied text > Follow Up:  LLE wound dehiscence/cellulitis    Interval History/ROS: No chills or fevers overnight. Wound vac applied to LLE wound. Denies any significant pain to LLE. She is waiting to be washed, no cough, no SOB, no N/V/D, no abdominal pain.     Allergies  No Known Allergies        ANTIMICROBIALS:      OTHER MEDS:  MEDICATIONS  (STANDING):  acetaminophen   Tablet .. 650 every 6 hours PRN  apixaban 5 every 12 hours  cilostazol 50 two times a day  dextrose 40% Gel 15 once PRN  dextrose 50% Injectable 12.5 once  dextrose 50% Injectable 25 once  dextrose 50% Injectable 25 once  docusate sodium 100 two times a day  glucagon  Injectable 1 once PRN  insulin lispro (HumaLOG) corrective regimen sliding scale  three times a day before meals  metoprolol tartrate 12.5 two times a day  oxyCODONE    IR 5 every 6 hours PRN  senna 2 at bedtime  simvastatin 40 at bedtime  topiramate 100 two times a day      Vital Signs Last 24 Hrs  T(C): 36.6 (09 Mar 2019 06:24), Max: 36.8 (08 Mar 2019 17:44)  T(F): 97.8 (09 Mar 2019 06:24), Max: 98.3 (08 Mar 2019 17:44)  HR: 89 (09 Mar 2019 06:24) (80 - 89)  BP: 100/62 (09 Mar 2019 06:24) (93/41 - 102/56)  RR: 16 (09 Mar 2019 06:24) (16 - 18)  SpO2: 99% (09 Mar 2019 06:24) (96% - 99%)      PHYSICAL EXAM:  General: non-toxic, alert, oriented.   HEAD/EYES: anicteric  ENT:  supple  Cardiovascular:  tachycardic and irregularly irregular, S1, S2, loud systolic murmur best heard at LLSB  Respiratory:  clear bilaterally  GI:  soft, non-tender, normal bowel sounds  Musculoskeletal: left back ulcer well healing with clean granulation tissue, There is a LLE wound vac   Skin:  no rash  Psychiatric:  appropriate affect  Vascular:  no phlebitis, +LUE PICC (No surrounding erythema, drainage or tenderness to palpation)                          7.5    3.20  )-----------( 365      ( 09 Mar 2019 06:30 )             26.2       03-09    140  |  108<H>  |  11  ----------------------------<  109<H>  3.8   |  23  |  0.71    Ca    8.8      09 Mar 2019 06:30  Mg     2.1     03-09      MICROBIOLOGY:  v  LEG  02-25-19 --  --  Pseudomonas putida  Staphylococcus sp.,coag neg      RADIOLOGY:    RADIOLOGY:  < from: CT Lower Extremity w/ IV Cont, Left (02.24.19 @ 01:43) >  IMPRESSION:   Severe diffuse subcutaneous edema in the visualized portions of both   lower extremities.  Correlate clinically for anasarca versus cellulitis.  No discrete abscess or drainable fluid collection.    No evidence of necrotizing infection.    No CT evidence of advanced osteomyelitis.    Severe peripheral vascular disease.  Claudication should be excluded   clinically.

## 2019-03-10 LAB
ANION GAP SERPL CALC-SCNC: 9 MMO/L — SIGNIFICANT CHANGE UP (ref 7–14)
BASOPHILS # BLD AUTO: 0.01 K/UL — SIGNIFICANT CHANGE UP (ref 0–0.2)
BASOPHILS NFR BLD AUTO: 0.3 % — SIGNIFICANT CHANGE UP (ref 0–2)
BUN SERPL-MCNC: 11 MG/DL — SIGNIFICANT CHANGE UP (ref 7–23)
CALCIUM SERPL-MCNC: 9.7 MG/DL — SIGNIFICANT CHANGE UP (ref 8.4–10.5)
CHLORIDE SERPL-SCNC: 107 MMOL/L — SIGNIFICANT CHANGE UP (ref 98–107)
CO2 SERPL-SCNC: 22 MMOL/L — SIGNIFICANT CHANGE UP (ref 22–31)
CREAT SERPL-MCNC: 0.64 MG/DL — SIGNIFICANT CHANGE UP (ref 0.5–1.3)
EOSINOPHIL # BLD AUTO: 0.14 K/UL — SIGNIFICANT CHANGE UP (ref 0–0.5)
EOSINOPHIL NFR BLD AUTO: 3.9 % — SIGNIFICANT CHANGE UP (ref 0–6)
GLUCOSE BLDC GLUCOMTR-MCNC: 108 MG/DL — HIGH (ref 70–99)
GLUCOSE BLDC GLUCOMTR-MCNC: 108 MG/DL — HIGH (ref 70–99)
GLUCOSE BLDC GLUCOMTR-MCNC: 130 MG/DL — HIGH (ref 70–99)
GLUCOSE BLDC GLUCOMTR-MCNC: 141 MG/DL — HIGH (ref 70–99)
GLUCOSE SERPL-MCNC: 98 MG/DL — SIGNIFICANT CHANGE UP (ref 70–99)
HCT VFR BLD CALC: 31 % — LOW (ref 34.5–45)
HGB BLD-MCNC: 9 G/DL — LOW (ref 11.5–15.5)
IMM GRANULOCYTES NFR BLD AUTO: 0 % — SIGNIFICANT CHANGE UP (ref 0–1.5)
LYMPHOCYTES # BLD AUTO: 1.65 K/UL — SIGNIFICANT CHANGE UP (ref 1–3.3)
LYMPHOCYTES # BLD AUTO: 46.3 % — HIGH (ref 13–44)
MAGNESIUM SERPL-MCNC: 2.1 MG/DL — SIGNIFICANT CHANGE UP (ref 1.6–2.6)
MCHC RBC-ENTMCNC: 29 % — LOW (ref 32–36)
MCHC RBC-ENTMCNC: 29 PG — SIGNIFICANT CHANGE UP (ref 27–34)
MCV RBC AUTO: 100 FL — SIGNIFICANT CHANGE UP (ref 80–100)
MONOCYTES # BLD AUTO: 0.4 K/UL — SIGNIFICANT CHANGE UP (ref 0–0.9)
MONOCYTES NFR BLD AUTO: 11.2 % — SIGNIFICANT CHANGE UP (ref 2–14)
NEUTROPHILS # BLD AUTO: 1.36 K/UL — LOW (ref 1.8–7.4)
NEUTROPHILS NFR BLD AUTO: 38.3 % — LOW (ref 43–77)
NRBC # FLD: 0 K/UL — LOW (ref 25–125)
PLATELET # BLD AUTO: 442 K/UL — HIGH (ref 150–400)
PMV BLD: 9.4 FL — SIGNIFICANT CHANGE UP (ref 7–13)
POTASSIUM SERPL-MCNC: 4 MMOL/L — SIGNIFICANT CHANGE UP (ref 3.5–5.3)
POTASSIUM SERPL-SCNC: 4 MMOL/L — SIGNIFICANT CHANGE UP (ref 3.5–5.3)
RBC # BLD: 3.1 M/UL — LOW (ref 3.8–5.2)
RBC # FLD: 19.7 % — HIGH (ref 10.3–14.5)
SODIUM SERPL-SCNC: 138 MMOL/L — SIGNIFICANT CHANGE UP (ref 135–145)
WBC # BLD: 3.56 K/UL — LOW (ref 3.8–10.5)
WBC # FLD AUTO: 3.56 K/UL — LOW (ref 3.8–10.5)

## 2019-03-10 RX ADMIN — OXYCODONE HYDROCHLORIDE 5 MILLIGRAM(S): 5 TABLET ORAL at 06:04

## 2019-03-10 RX ADMIN — CHLORHEXIDINE GLUCONATE 1 APPLICATION(S): 213 SOLUTION TOPICAL at 18:25

## 2019-03-10 RX ADMIN — CILOSTAZOL 50 MILLIGRAM(S): 100 TABLET ORAL at 18:25

## 2019-03-10 RX ADMIN — Medication 12.5 MILLIGRAM(S): at 18:25

## 2019-03-10 RX ADMIN — Medication 100 MILLIGRAM(S): at 11:41

## 2019-03-10 RX ADMIN — Medication 12.5 MILLIGRAM(S): at 05:34

## 2019-03-10 RX ADMIN — APIXABAN 5 MILLIGRAM(S): 2.5 TABLET, FILM COATED ORAL at 18:24

## 2019-03-10 RX ADMIN — APIXABAN 5 MILLIGRAM(S): 2.5 TABLET, FILM COATED ORAL at 05:34

## 2019-03-10 RX ADMIN — CHLORHEXIDINE GLUCONATE 1 APPLICATION(S): 213 SOLUTION TOPICAL at 05:35

## 2019-03-10 RX ADMIN — SIMVASTATIN 40 MILLIGRAM(S): 20 TABLET, FILM COATED ORAL at 21:50

## 2019-03-10 RX ADMIN — Medication 100 MILLIGRAM(S): at 18:26

## 2019-03-10 RX ADMIN — OXYCODONE HYDROCHLORIDE 5 MILLIGRAM(S): 5 TABLET ORAL at 22:21

## 2019-03-10 RX ADMIN — OXYCODONE HYDROCHLORIDE 5 MILLIGRAM(S): 5 TABLET ORAL at 21:52

## 2019-03-10 RX ADMIN — CILOSTAZOL 50 MILLIGRAM(S): 100 TABLET ORAL at 05:34

## 2019-03-10 RX ADMIN — Medication 1 TABLET(S): at 08:37

## 2019-03-10 RX ADMIN — OXYCODONE HYDROCHLORIDE 5 MILLIGRAM(S): 5 TABLET ORAL at 05:34

## 2019-03-10 RX ADMIN — Medication 1 TABLET(S): at 18:24

## 2019-03-10 RX ADMIN — Medication 100 MILLIGRAM(S): at 05:34

## 2019-03-10 NOTE — PROGRESS NOTE ADULT - SUBJECTIVE AND OBJECTIVE BOX
Patient is a 83y old  Female who presents with a chief complaint of LLE wound dehiscence/cellulitis (09 Mar 2019 09:57)      INTERVAL HPI/OVERNIGHT EVENTS:    MEDICATIONS  (STANDING):  apixaban 5 milliGRAM(s) Oral every 12 hours  chlorhexidine 4% Liquid 1 Application(s) Topical every 12 hours  cilostazol 50 milliGRAM(s) Oral two times a day  dextrose 50% Injectable 12.5 Gram(s) IV Push once  dextrose 50% Injectable 25 Gram(s) IV Push once  dextrose 50% Injectable 25 Gram(s) IV Push once  docusate sodium 100 milliGRAM(s) Oral two times a day  insulin lispro (HumaLOG) corrective regimen sliding scale   SubCutaneous three times a day before meals  lactobacillus acidophilus 1 Tablet(s) Oral two times a day with meals  metoprolol tartrate 12.5 milliGRAM(s) Oral two times a day  pyridoxine 100 milliGRAM(s) Oral daily  senna 2 Tablet(s) Oral at bedtime  simvastatin 40 milliGRAM(s) Oral at bedtime  topiramate 100 milliGRAM(s) Oral two times a day    MEDICATIONS  (PRN):  acetaminophen   Tablet .. 650 milliGRAM(s) Oral every 6 hours PRN Temp greater or equal to 38C (100.4F), Mild Pain (1 - 3)  dextrose 40% Gel 15 Gram(s) Oral once PRN Blood Glucose LESS THAN 70 milliGRAM(s)/deciLiter  glucagon  Injectable 1 milliGRAM(s) IntraMuscular once PRN Glucose <70 milliGRAM(s)/deciLiter  oxyCODONE    IR 5 milliGRAM(s) Oral every 6 hours PRN moderate to severe pain        Orders last 24 hours:  POCT  Blood Glucose (03-09-19 @ 08:36)  POCT  Blood Glucose (03-09-19 @ 12:48)  POCT  Blood Glucose (03-09-19 @ 17:32)  POCT  Blood Glucose (03-09-19 @ 22:31)      Allergies    No Known Allergies    Intolerances        REVIEW OF SYSTEMS:  CARDIOVASCULAR: No chest pain, palpitations, dizziness, or leg swelling; no shortness of breath     RESPIRATORY: No cough, wheezing, chills or hemoptysis; No shortness of breath    GASTROINTESTINAL: No abdominal or epigastric pain. No nausea, vomiting, or hematemesis; No diarrhea or constipation. No melena or hematochezia.    NEUROLOGICAL: No headaches, memory loss, loss of strength, numbness      PHYSICAL EXAM:  Vital Signs Last 24 Hrs  T(C): 36.6 (10 Mar 2019 05:31), Max: 36.6 (10 Mar 2019 05:31)  T(F): 97.8 (10 Mar 2019 05:31), Max: 97.8 (10 Mar 2019 05:31)  HR: 83 (10 Mar 2019 05:31) (83 - 110)  BP: 111/64 (10 Mar 2019 05:31) (102/56 - 113/53)  BP(mean): --  RR: 18 (10 Mar 2019 05:31) (16 - 18)  SpO2: 99% (10 Mar 2019 05:31) (96% - 99%)    GENERAL: NAD, well-groomed, well-developed  HEAD:  Atraumatic, Normocephalic  EYES: EOMI, PERRLA, conjunctiva and sclera clear  NECK: Supple, No JVD, Normal thyroid  NERVOUS SYSTEM:  Alert & Oriented X3, Good concentration;  and symmetric  CHEST/LUNG: Clear to auscultation bilaterally; No rales, rhonchi, wheezing, or rubs  HEART: S1S2 irregularly iregular, without murmur, rub nor gallop  ABDOMEN: Soft, Nontender, Nondistended; Bowel sounds present  EXTREMITIES:  2+ Peripheral Pulses, No clubbing, cyanosis, or edema      LABS:                        9.0    3.56  )-----------( 442      ( 10 Mar 2019 06:00 )             31.0     10 Mar 2019 06:00    138    |  107    |  11     ----------------------------<  98     4.0     |  22     |  0.64     Ca    9.7        10 Mar 2019 06:00  Mg     2.1       10 Mar 2019 06:00        CAPILLARY BLOOD GLUCOSE      POCT Blood Glucose.: 150 mg/dL (09 Mar 2019 22:26)  POCT Blood Glucose.: 114 mg/dL (09 Mar 2019 17:16)  POCT Blood Glucose.: 132 mg/dL (09 Mar 2019 12:37)  POCT Blood Glucose.: 100 mg/dL (09 Mar 2019 08:35)        Consultant(s) Notes Reviewed:        assessment:  stable, awaiting rehab placement     Plan:       Care Discussed with Consultants/Other Providers:

## 2019-03-11 ENCOUNTER — TRANSCRIPTION ENCOUNTER (OUTPATIENT)
Age: 84
End: 2019-03-11

## 2019-03-11 VITALS — HEART RATE: 81 BPM | DIASTOLIC BLOOD PRESSURE: 65 MMHG | SYSTOLIC BLOOD PRESSURE: 116 MMHG

## 2019-03-11 LAB
ANION GAP SERPL CALC-SCNC: 9 MMO/L — SIGNIFICANT CHANGE UP (ref 7–14)
BASOPHILS # BLD AUTO: 0.01 K/UL — SIGNIFICANT CHANGE UP (ref 0–0.2)
BASOPHILS NFR BLD AUTO: 0.3 % — SIGNIFICANT CHANGE UP (ref 0–2)
BLD GP AB SCN SERPL QL: NEGATIVE — SIGNIFICANT CHANGE UP
BUN SERPL-MCNC: 11 MG/DL — SIGNIFICANT CHANGE UP (ref 7–23)
CALCIUM SERPL-MCNC: 9.2 MG/DL — SIGNIFICANT CHANGE UP (ref 8.4–10.5)
CHLORIDE SERPL-SCNC: 108 MMOL/L — HIGH (ref 98–107)
CO2 SERPL-SCNC: 23 MMOL/L — SIGNIFICANT CHANGE UP (ref 22–31)
CREAT SERPL-MCNC: 0.68 MG/DL — SIGNIFICANT CHANGE UP (ref 0.5–1.3)
EOSINOPHIL # BLD AUTO: 0.07 K/UL — SIGNIFICANT CHANGE UP (ref 0–0.5)
EOSINOPHIL NFR BLD AUTO: 2.3 % — SIGNIFICANT CHANGE UP (ref 0–6)
GLUCOSE BLDC GLUCOMTR-MCNC: 103 MG/DL — HIGH (ref 70–99)
GLUCOSE BLDC GLUCOMTR-MCNC: 131 MG/DL — HIGH (ref 70–99)
GLUCOSE SERPL-MCNC: 95 MG/DL — SIGNIFICANT CHANGE UP (ref 70–99)
HCT VFR BLD CALC: 25.8 % — LOW (ref 34.5–45)
HCT VFR BLD CALC: 31.1 % — LOW (ref 34.5–45)
HGB BLD-MCNC: 7.4 G/DL — LOW (ref 11.5–15.5)
HGB BLD-MCNC: 9.2 G/DL — LOW (ref 11.5–15.5)
IMM GRANULOCYTES NFR BLD AUTO: 0 % — SIGNIFICANT CHANGE UP (ref 0–1.5)
LYMPHOCYTES # BLD AUTO: 1.3 K/UL — SIGNIFICANT CHANGE UP (ref 1–3.3)
LYMPHOCYTES # BLD AUTO: 43.3 % — SIGNIFICANT CHANGE UP (ref 13–44)
MAGNESIUM SERPL-MCNC: 2 MG/DL — SIGNIFICANT CHANGE UP (ref 1.6–2.6)
MCHC RBC-ENTMCNC: 28.7 % — LOW (ref 32–36)
MCHC RBC-ENTMCNC: 28.7 PG — SIGNIFICANT CHANGE UP (ref 27–34)
MCHC RBC-ENTMCNC: 29.3 PG — SIGNIFICANT CHANGE UP (ref 27–34)
MCHC RBC-ENTMCNC: 29.6 % — LOW (ref 32–36)
MCV RBC AUTO: 100 FL — SIGNIFICANT CHANGE UP (ref 80–100)
MCV RBC AUTO: 99 FL — SIGNIFICANT CHANGE UP (ref 80–100)
MONOCYTES # BLD AUTO: 0.31 K/UL — SIGNIFICANT CHANGE UP (ref 0–0.9)
MONOCYTES NFR BLD AUTO: 10.3 % — SIGNIFICANT CHANGE UP (ref 2–14)
NEUTROPHILS # BLD AUTO: 1.31 K/UL — LOW (ref 1.8–7.4)
NEUTROPHILS NFR BLD AUTO: 43.8 % — SIGNIFICANT CHANGE UP (ref 43–77)
NRBC # FLD: 0 K/UL — LOW (ref 25–125)
NRBC # FLD: 0 K/UL — LOW (ref 25–125)
PLATELET # BLD AUTO: 348 K/UL — SIGNIFICANT CHANGE UP (ref 150–400)
PLATELET # BLD AUTO: 418 K/UL — HIGH (ref 150–400)
PMV BLD: 9.2 FL — SIGNIFICANT CHANGE UP (ref 7–13)
PMV BLD: 9.2 FL — SIGNIFICANT CHANGE UP (ref 7–13)
POTASSIUM SERPL-MCNC: 3.8 MMOL/L — SIGNIFICANT CHANGE UP (ref 3.5–5.3)
POTASSIUM SERPL-SCNC: 3.8 MMOL/L — SIGNIFICANT CHANGE UP (ref 3.5–5.3)
RBC # BLD: 2.58 M/UL — LOW (ref 3.8–5.2)
RBC # BLD: 3.14 M/UL — LOW (ref 3.8–5.2)
RBC # FLD: 19.6 % — HIGH (ref 10.3–14.5)
RBC # FLD: 19.7 % — HIGH (ref 10.3–14.5)
RH IG SCN BLD-IMP: POSITIVE — SIGNIFICANT CHANGE UP
SODIUM SERPL-SCNC: 140 MMOL/L — SIGNIFICANT CHANGE UP (ref 135–145)
WBC # BLD: 3 K/UL — LOW (ref 3.8–10.5)
WBC # BLD: 3.53 K/UL — LOW (ref 3.8–10.5)
WBC # FLD AUTO: 3 K/UL — LOW (ref 3.8–10.5)
WBC # FLD AUTO: 3.53 K/UL — LOW (ref 3.8–10.5)

## 2019-03-11 PROCEDURE — 99232 SBSQ HOSP IP/OBS MODERATE 35: CPT

## 2019-03-11 RX ORDER — PYRIDOXINE HCL (VITAMIN B6) 100 MG
1 TABLET ORAL
Qty: 0 | Refills: 0 | COMMUNITY

## 2019-03-11 RX ORDER — PYRIDOXINE HCL (VITAMIN B6) 100 MG
1 TABLET ORAL
Qty: 0 | Refills: 0 | DISCHARGE
Start: 2019-03-11

## 2019-03-11 RX ADMIN — CHLORHEXIDINE GLUCONATE 1 APPLICATION(S): 213 SOLUTION TOPICAL at 06:44

## 2019-03-11 RX ADMIN — APIXABAN 5 MILLIGRAM(S): 2.5 TABLET, FILM COATED ORAL at 06:44

## 2019-03-11 RX ADMIN — OXYCODONE HYDROCHLORIDE 5 MILLIGRAM(S): 5 TABLET ORAL at 13:51

## 2019-03-11 RX ADMIN — Medication 100 MILLIGRAM(S): at 06:44

## 2019-03-11 RX ADMIN — OXYCODONE HYDROCHLORIDE 5 MILLIGRAM(S): 5 TABLET ORAL at 14:37

## 2019-03-11 RX ADMIN — Medication 100 MILLIGRAM(S): at 12:12

## 2019-03-11 RX ADMIN — CILOSTAZOL 50 MILLIGRAM(S): 100 TABLET ORAL at 06:44

## 2019-03-11 RX ADMIN — Medication 1 TABLET(S): at 09:00

## 2019-03-11 RX ADMIN — Medication 12.5 MILLIGRAM(S): at 09:00

## 2019-03-11 NOTE — PROGRESS NOTE ADULT - ASSESSMENT
83 y.o. female PMH DM, Afib on Eliquis, s/p fall from a stool in her kitchen two weeks ago on Feb 9 and was impaled on her back on a kitchen appliance for 48 hours before her daughter found her, hospitalized at [facility] for acute blood loss anemia due to hematoma and had a hemoglobin of 6 (baseline around 9) requiring 3 units of pRBC, seen by surgeon Dr. Dorian Lazaro and had wound VAc placed to her left back  and LLE (wound VAC LLE removed prior to discharge to rehab 4 days ago, who now presents with wound dehiscence LLE and low grade fever 99F at rehab and was started on zosyn   Patient with wound dehiscence  s/p zosyn 14 day course  Clinically no s/s residual infection  No s/s other nosocomial infection  Stable off antimicrobials  Continue wound care per primary team  will defer to primary team on other plan  ID will follow as needed,please call if any questions or issues.

## 2019-03-11 NOTE — CHART NOTE - NSCHARTNOTEFT_GEN_A_CORE
right midline removed without complications. small dressing applied . Hemostasis achieved.   CBC stable . Will d/c to rehab today as d/w MD .

## 2019-03-11 NOTE — PROGRESS NOTE ADULT - SUBJECTIVE AND OBJECTIVE BOX
Patient is a 83y old  Female who presents with a chief complaint of LLE wound dehiscence/cellulitis (11 Mar 2019 09:25)    Being followed by ID for h/o LLE wound infection    Interval history:feels well  denies any leg pain   No acute events      ROS:  No cough,SOB,CP  No N/V/D./abd pain  No other complaints      Antimicrobials:  Off abx since friday    Other medications reviewed    Vital Signs Last 24 Hrs  T(C): 36.6 (03-11-19 @ 06:36), Max: 36.8 (03-10-19 @ 18:22)  T(F): 97.9 (03-11-19 @ 06:36), Max: 98.2 (03-10-19 @ 18:22)  HR: 81 (03-11-19 @ 08:59) (60 - 88)  BP: 116/65 (03-11-19 @ 08:59) (98/55 - 116/65)  BP(mean): --  RR: 16 (03-11-19 @ 06:36) (16 - 18)  SpO2: 100% (03-11-19 @ 06:36) (100% - 100%)    Physical Exam:        HEENT PERRLA EOMI    No oral exudate or erythema    Chest Good AE,CTA    CVS RRR S1 S2 WNl No murmur or rub or gallop    Abd soft BS normal No tenderness no masses    IV site no erythema tenderness or discharge  LLE dressinga nd wound vac  No tenderness   No erythema     CNS AAO X 3 no focal    Lab Data:                          7.4    3.00  )-----------( 348      ( 11 Mar 2019 06:47 )             25.8       03-11    140  |  108<H>  |  11  ----------------------------<  95  3.8   |  23  |  0.68    Ca    9.2      11 Mar 2019 06:47  Mg     2.0     03-11

## 2019-03-11 NOTE — PROGRESS NOTE ADULT - REASON FOR ADMISSION
LLE wound dehiscence/cellulitis
s/p LLE wound debridement
LLE wound dehiscence/cellulitis

## 2019-03-11 NOTE — DISCHARGE NOTE NURSING/CASE MANAGEMENT/SOCIAL WORK - NSDCDPATPORTLINK_GEN_ALL_CORE
You can access the [facility], offered by [facility], by registering with the following website: [email or web address]

## 2019-03-11 NOTE — PROGRESS NOTE ADULT - SUBJECTIVE AND OBJECTIVE BOX
Patient is a 83y old  Female who presents with a chief complaint of LLE wound dehiscence/cellulitis (10 Mar 2019 08:19)      INTERVAL HPI/OVERNIGHT EVENTS: none     MEDICATIONS  (STANDING):  apixaban 5 milliGRAM(s) Oral every 12 hours  chlorhexidine 4% Liquid 1 Application(s) Topical every 12 hours  cilostazol 50 milliGRAM(s) Oral two times a day  dextrose 50% Injectable 12.5 Gram(s) IV Push once  dextrose 50% Injectable 25 Gram(s) IV Push once  dextrose 50% Injectable 25 Gram(s) IV Push once  docusate sodium 100 milliGRAM(s) Oral two times a day  insulin lispro (HumaLOG) corrective regimen sliding scale   SubCutaneous three times a day before meals  lactobacillus acidophilus 1 Tablet(s) Oral two times a day with meals  metoprolol tartrate 12.5 milliGRAM(s) Oral two times a day  pyridoxine 100 milliGRAM(s) Oral daily  senna 2 Tablet(s) Oral at bedtime  simvastatin 40 milliGRAM(s) Oral at bedtime  topiramate 100 milliGRAM(s) Oral two times a day    MEDICATIONS  (PRN):  acetaminophen   Tablet .. 650 milliGRAM(s) Oral every 6 hours PRN Temp greater or equal to 38C (100.4F), Mild Pain (1 - 3)  dextrose 40% Gel 15 Gram(s) Oral once PRN Blood Glucose LESS THAN 70 milliGRAM(s)/deciLiter  glucagon  Injectable 1 milliGRAM(s) IntraMuscular once PRN Glucose <70 milliGRAM(s)/deciLiter  oxyCODONE    IR 5 milliGRAM(s) Oral every 6 hours PRN moderate to severe pain        Orders last 24 hours:  POCT  Blood Glucose (03-10-19 @ 12:35)  POCT  Blood Glucose (03-10-19 @ 17:20)  POCT  Blood Glucose (03-10-19 @ 21:40)  POCT  Blood Glucose (03-11-19 @ 08:47)  Complete Blood Count: STAT (03-11-19 @ 09:02)  Type + Screen: Routine (03-11-19 @ 09:03)      Allergies    No Known Allergies    Intolerances        REVIEW OF SYSTEMS:  CARDIOVASCULAR: No chest pain, palpitations, dizziness, or leg swelling; no shortness of breath     RESPIRATORY: No cough, wheezing, chills or hemoptysis; No shortness of breath    GASTROINTESTINAL: No abdominal or epigastric pain. No nausea, vomiting, or hematemesis; No diarrhea or constipation. No melena or hematochezia.    NEUROLOGICAL: No headaches, memory loss, loss of strength, numbness      PHYSICAL EXAM:  Vital Signs Last 24 Hrs  T(C): 36.6 (11 Mar 2019 06:36), Max: 36.8 (10 Mar 2019 18:22)  T(F): 97.9 (11 Mar 2019 06:36), Max: 98.2 (10 Mar 2019 18:22)  HR: 81 (11 Mar 2019 08:59) (60 - 88)  BP: 116/65 (11 Mar 2019 08:59) (98/55 - 116/65)  BP(mean): --  RR: 16 (11 Mar 2019 06:36) (16 - 18)  SpO2: 100% (11 Mar 2019 06:36) (100% - 100%)    GENERAL: NAD, well-groomed, well-developed  HEAD:  Atraumatic, Normocephalic  EYES: EOMI, PERRLA, conjunctiva and sclera clear  NECK: Supple, No JVD, Normal thyroid  NERVOUS SYSTEM:  Alert & Oriented X3, Good concentration;  and symmetric  CHEST/LUNG: Clear to auscultation bilaterally; No rales, rhonchi, wheezing, or rubs  HEART: S1S2 irregularly irregular, with II/VI systolic murmur left sternal border, without  rub nor gallop  ABDOMEN: Soft, Nontender, Nondistended; Bowel sounds present  EXTREMITIES:  2+ Peripheral Pulses, No clubbing, cyanosis, or edema      LABS:                        7.4    3.00  )-----------( 348      ( 11 Mar 2019 06:47 )             25.8     11 Mar 2019 06:47    140    |  108    |  11     ----------------------------<  95     3.8     |  23     |  0.68     Ca    9.2        11 Mar 2019 06:47  Mg     2.0       11 Mar 2019 06:47        CAPILLARY BLOOD GLUCOSE      POCT Blood Glucose.: 103 mg/dL (11 Mar 2019 08:45)  POCT Blood Glucose.: 130 mg/dL (10 Mar 2019 21:36)  POCT Blood Glucose.: 108 mg/dL (10 Mar 2019 17:10)  POCT Blood Glucose.: 141 mg/dL (10 Mar 2019 12:34)           assessment:  pt stable.  HB 7.4= was yesterday's level of 9 an error?       Plan:   awaiting placement in [facility].  Wound care.  Repeat CBC.

## 2019-03-11 NOTE — PROGRESS NOTE ADULT - PROVIDER SPECIALTY LIST ADULT
Anesthesia
Heme/Onc
Infectious Disease
Internal Medicine
Plastic Surgery
Plastic Surgery
Surgery
Vascular Surgery
Wound Care
Infectious Disease

## 2019-03-14 LAB — GAS PNL BLDMV: SIGNIFICANT CHANGE UP

## 2019-03-14 NOTE — H&P ADULT - REASON FOR ADMISSION
Called patient spoke with him advised appointment tomorrow with Barbra Santiago @ 3:20. Patient thankful and verbalized understanding. LLE wound dehiscence/cellulitis

## 2019-04-17 PROBLEM — I48.91 UNSPECIFIED ATRIAL FIBRILLATION: Chronic | Status: ACTIVE | Noted: 2019-02-23

## 2019-04-17 PROBLEM — E11.9 TYPE 2 DIABETES MELLITUS WITHOUT COMPLICATIONS: Chronic | Status: ACTIVE | Noted: 2019-02-23

## 2019-05-02 ENCOUNTER — OUTPATIENT (OUTPATIENT)
Dept: OUTPATIENT SERVICES | Facility: HOSPITAL | Age: 84
LOS: 1 days | Discharge: HOME | End: 2019-05-02

## 2019-05-02 VITALS
HEIGHT: 60 IN | WEIGHT: 179.9 LBS | HEART RATE: 83 BPM | TEMPERATURE: 98 F | RESPIRATION RATE: 16 BRPM | OXYGEN SATURATION: 99 % | SYSTOLIC BLOOD PRESSURE: 119 MMHG | DIASTOLIC BLOOD PRESSURE: 71 MMHG

## 2019-05-02 VITALS
HEART RATE: 82 BPM | SYSTOLIC BLOOD PRESSURE: 118 MMHG | RESPIRATION RATE: 12 BRPM | DIASTOLIC BLOOD PRESSURE: 64 MMHG | OXYGEN SATURATION: 100 %

## 2019-05-02 DIAGNOSIS — Z98.890 OTHER SPECIFIED POSTPROCEDURAL STATES: Chronic | ICD-10-CM

## 2019-05-02 DIAGNOSIS — L03.116 CELLULITIS OF LEFT LOWER LIMB: ICD-10-CM

## 2019-05-02 DIAGNOSIS — T14.8XXA OTHER INJURY OF UNSPECIFIED BODY REGION, INITIAL ENCOUNTER: Chronic | ICD-10-CM

## 2019-05-02 DIAGNOSIS — Z98.51 TUBAL LIGATION STATUS: Chronic | ICD-10-CM

## 2019-05-02 DIAGNOSIS — Z96.651 PRESENCE OF RIGHT ARTIFICIAL KNEE JOINT: Chronic | ICD-10-CM

## 2019-05-02 LAB
GLUCOSE BLDC GLUCOMTR-MCNC: 91 MG/DL — SIGNIFICANT CHANGE UP (ref 70–99)
GLUCOSE BLDC GLUCOMTR-MCNC: 92 MG/DL — SIGNIFICANT CHANGE UP (ref 70–99)

## 2019-05-02 RX ORDER — SODIUM CHLORIDE 9 MG/ML
1000 INJECTION, SOLUTION INTRAVENOUS
Qty: 0 | Refills: 0 | Status: DISCONTINUED | OUTPATIENT
Start: 2019-05-02 | End: 2019-05-17

## 2019-05-02 RX ORDER — METOPROLOL TARTRATE 50 MG
0.5 TABLET ORAL
Qty: 0 | Refills: 0 | COMMUNITY

## 2019-05-02 RX ORDER — METOPROLOL TARTRATE 50 MG
12.5 TABLET ORAL ONCE
Qty: 0 | Refills: 0 | Status: COMPLETED | OUTPATIENT
Start: 2019-05-02 | End: 2019-05-02

## 2019-05-02 RX ORDER — METOPROLOL TARTRATE 50 MG
0.5 TABLET ORAL
Qty: 14 | Refills: 0
Start: 2019-05-02 | End: 2019-05-15

## 2019-05-02 RX ADMIN — Medication 12.5 MILLIGRAM(S): at 10:00

## 2019-05-02 RX ADMIN — SODIUM CHLORIDE 30 MILLILITER(S): 9 INJECTION, SOLUTION INTRAVENOUS at 10:04

## 2019-05-02 NOTE — CHART NOTE - NSCHARTNOTEFT_GEN_A_CORE
pt seen in amb surgery unit    Surgery canceled due to AF with RVR, slowed with Esmololol    Currently awake and alert,   Patient is a 83y old  Female who presents with a chief complaint of     INTERVAL HPI/OVERNIGHT EVENTS:    MEDICATIONS  (STANDING):  lactated ringers. 1000 milliLiter(s) (30 mL/Hr) IV Continuous <Continuous>  metoprolol tartrate 12.5 milliGRAM(s) Oral once    MEDICATIONS  (PRN):        Orders last 24 hours:  lactated ringers.: Solution, 1000 milliLiter(s) infuse at 30 mL/Hr (05-02-19 @ 08:07)  POCT  Blood Glucose (05-02-19 @ 08:28)  (Floorstock):   Qty Removed: 1 each (05-02-19 @ 08:38)  (Floorstock):   Qty Removed: 2 each (05-02-19 @ 08:41)  (ADM OVERRIDE):   Qty Removed: 1 each  Route - Dose Given <see task> (05-02-19 @ 08:44)  (ADM OVERRIDE):   Qty Removed: 1 each  Route - Dose Given <see task> (05-02-19 @ 08:44)  metoprolol tartrate: [Known as LOPRESSOR]  12.5 milliGRAM(s), Oral, once, Stop After 1 Doses (05-02-19 @ 09:53)      Allergies    No Known Allergies    Intolerances        REVIEW OF SYSTEMS:  CARDIOVASCULAR: No chest pain, palpitations, dizziness, or leg swelling; no shortness of breath     RESPIRATORY: No cough, wheezing, chills or hemoptysis; No shortness of breath    GASTROINTESTINAL: No abdominal or epigastric pain. No nausea, vomiting, or hematemesis; No diarrhea or constipation. No melena or hematochezia.    NEUROLOGICAL: No headaches, memory loss, loss of strength, numbness      PHYSICAL EXAM:  Vital Signs Last 24 Hrs  T(C): 36.5 (02 May 2019 09:05), Max: 36.6 (02 May 2019 07:23)  T(F): 97.7 (02 May 2019 09:05), Max: 97.8 (02 May 2019 07:23)  HR: 93 (02 May 2019 09:45) (83 - 93)  BP: 113/69 (02 May 2019 09:45) (98/54 - 119/71)  BP(mean): 80 (02 May 2019 09:45) (65 - 80)  RR: 14 (02 May 2019 09:45) (14 - 17)  SpO2: 97% (02 May 2019 09:45) (97% - 99%)    < from: Transthoracic Echocardiogram (03.05.19 @ 13:30) >    CONCLUSIONS:  1. Mitral annular calcification, otherwise normal mitral  valve. Mild mitral regurgitation.  2. Severely dilated left atrium.  LA volume index = 50  cc/m2.  3. Normal left ventricular internal dimensions and wall  thicknesses.  4. Endocardium not well visualized; grossly mild global  left ventricular systolic dysfunction.  5. Severe right atrial enlargement.  6. Unable to accurately evaluate right ventricular size or  systolic function.    < end of copied text >      :        assessment:  pt off metoprolol in rehab.  To start 12.5 bid and re-assess as out patient      Care Discussed with Consultants/Other Providers: Dr Casas.   Dr Jerry Carey

## 2019-05-02 NOTE — H&P ADULT - NSICDXFAMILYHX_GEN_ALL_CORE_FT
FAMILY HISTORY:  Father  Still living? Unknown  Family history of chronic ischemic heart disease, Age at diagnosis: Age Unknown

## 2019-05-02 NOTE — CHART NOTE - NSCHARTNOTEFT_GEN_A_CORE
Surgery was canceled due to patient being in rapid afib, , and mildly hypotensive (SBP 90s) once she was on the table in the OR. Patient did not receive any anesthetic at this time.  Patient was brought back to PACU for reevaluation. The patient's cardiologist, Dr. Sanderson, saw patient and recommended to discharge her home on Metoprolol 12.5mg BID, discontinue Eliquis for the time being, and follow up in his office on Monday/Tuesday for evaluation prior to rescheduling surgery. Patient is being discharged home in stable condition after receiving one dose of 12.5mg Metoprolol.

## 2019-05-02 NOTE — H&P ADULT - NSHPPHYSICALEXAM_GEN_ALL_CORE
Gen: NAD  Neuro: follows commands  Extr: wound over LLE, covered in ACE bandage. Gen: NAD, AOx3  Neuro: follows commands  Extr: [id number] wound over lateral left leg. Moving extremities.  WWP

## 2019-05-02 NOTE — H&P ADULT - HISTORY OF PRESENT ILLNESS
83 y.o. female PMH DM, Afib on Eliquis, s/p fall from a stool in her kitchen on Feb 9 and was impaled on her back on a kitchen appliance for 48 hours before her daughter found her, hospitalized at [facility] for acute blood loss anemia due to hematoma and had a hemoglobin of 6 (baseline around 9) requiring 3 units of pRBC, seen by surgeon Dr. Dorian Lazaro and had wound VAc placed to her left back and LLE (wound VAC LLE removed prior to discharge to rehab 4 days ago, who now presents with wound dehiscence LLE and low grade fever 99F at rehab and was started on zosyn. Patient now presents on 5/2 to undergo a split thickness sking grafting procedure to the left lower extremity wound by Dr. Smith. 83 y.o. female PMH DM, Afib on Eliquis (last taken 2 days ago), s/p fall from a stool in her kitchen on Feb 9 and was impaled on her back on a kitchen appliance for 48 hours before her daughter found her, hospitalized at [facility] for acute blood loss anemia due to hematoma and had a hemoglobin of 6 (baseline around 9) requiring 3 units of pRBC, seen by surgeon Dr. Dorian Lazaro and had wound VAc placed to her left back and LLE (wound VAC LLE removed prior to discharge to rehab 4 days ago, who now presents with wound dehiscence LLE and low grade fever 99F at rehab and was started on zosyn. Patient now presents on 5/2 to undergo a split thickness sking grafting procedure to the left lower extremity wound by Dr. Smith. 83 y.o. female PMH DM, Afib on Eliquis (last taken 2 days ago), aortic stenosis, and pulmonary hypoertension s/p fall from a stool in her kitchen on Feb 9 and was impaled on her back on a kitchen appliance for 48 hours before her daughter found her, hospitalized at [facility] for acute blood loss anemia due to hematoma and had a hemoglobin of 6 (baseline around 9) requiring 3 units of pRBC, seen by surgeon Dr. Dorian Lazaro and had wound VAc placed to her left back and LLE (wound VAC LLE removed prior to discharge to rehab 4 days ago, who now presents with wound dehiscence LLE and low grade fever 99F at rehab and was started on zosyn. Patient now presents on 5/2 to undergo a split thickness sking grafting procedure to the left lower extremity wound by Dr. Smith.

## 2019-05-02 NOTE — ASU DISCHARGE PLAN (ADULT/PEDIATRIC) - CARE PROVIDER_API CALL
Timoteo Sanderson)  Internal Medicine  [address] [place] [address]  Phone: [phone]  Fax: [phone]  Follow Up Time:

## 2019-05-02 NOTE — H&P ADULT - NSICDXPASTSURGICALHX_GEN_ALL_CORE_FT
PAST SURGICAL HISTORY:  H/O total knee replacement, right 2014    History of dilation and curettage 1990s    History of lumpectomy 2008    Injury due to foreign body Removal of foreign body impaled to back in 2019    S/P debridement LLE wound, 2019    Surgical history of tubal ligation 1969

## 2019-05-02 NOTE — ASU DISCHARGE PLAN (ADULT/PEDIATRIC) - ASU DC SPECIAL INSTRUCTIONSFT
Please follow up with Dr. Sanderson in his office on Monday or Tuesday.  Stop taking Eliquis until otherwise instructed by Dr. Sanderson.  Metorpolol 12.5mg two times a day has been sent to [facility] pharmacy. Please take as prescribed until otherwise instructed by Dr. Sanderson.  Once cleared by Dr. Sanderson for surgery, please call Dr. Smith's office to reschedule your surgery.

## 2019-05-02 NOTE — H&P ADULT - NSHPLABSRESULTS_GEN_ALL_CORE
Vitals 24hrs  Vital Signs Last 24 Hrs  T(C): 36.6 (02 May 2019 07:23), Max: 36.6 (02 May 2019 07:23)  T(F): 97.8 (02 May 2019 07:23), Max: 97.8 (02 May 2019 07:23)  HR: 83 (02 May 2019 07:23) (83 - 83)  BP: 119/71 (02 May 2019 07:23) (119/71 - 119/71)  BP(mean): --  RR: 16 (02 May 2019 07:23) (16 - 16)  SpO2: 99% (02 May 2019 07:23) (99% - 99%)        Lab Results 24hrs: Vitals 24hrs  Vital Signs Last 24 Hrs  T(C): 36.6 (02 May 2019 07:23), Max: 36.6 (02 May 2019 07:23)  T(F): 97.8 (02 May 2019 07:23), Max: 97.8 (02 May 2019 07:23)  HR: 83 (02 May 2019 07:23) (83 - 83)  BP: 119/71 (02 May 2019 07:23) (119/71 - 119/71)  BP(mean): --  RR: 16 (02 May 2019 07:23) (16 - 16)  SpO2: 99% (02 May 2019 07:23) (99% - 99%)        Lab Results 24hrs:    < from: Transthoracic Echocardiogram (03.05.19 @ 13:30) >    ------------------------------------------------------------------------  PROCEDURE: Transthoracic echocardiogram with 2-D, M-Mode  and complete spectral and color flow Doppler.  INDICATION: Abnormal electrocardiogram (ECG) (EKG) (R94.31)  ------------------------------------------------------------------------  DIMENSIONS:  Dimensions:     Normal Values:  LA:     4.6 cm    2.0 - 4.0 cm  Ao:     2.9 cm    2.0 - 3.8 cm  SEPTUM: 0.7 cm    0.6 - 1.2 cm  PWT:    0.7 cm    0.6 - 1.1 cm  LVIDd:  5.7 cm    3.0 - 5.6 cm  LVIDs:  4.2 cm    1.8 - 4.0 cm  Derived Variables:  LVMI: 76 g/m2  RWT: 0.24  Fractional short: 26 %  Ejection Fraction (Teicholtz): 51 %  ------------------------------------------------------------------------  OBSERVATIONS:  Mitral Valve: Mitral annular calcification, otherwise  normal mitral valve. Mild mitral regurgitation.  Aortic Root: Normal aortic root.  Aortic Valve: Calcified trileaflet aortic valve with  decreased opening. Peak transaortic valve gradient equals  34 mm Hg, mean transaortic valve gradient equals 20 mm Hg,  consistent with mild aortic stenosis.  Left Atrium: Severely dilated left atrium.  LA volume index  = 50 cc/m2.  Left Ventricle: Endocardium not well visualized; grossly  mild global left ventricular systolic dysfunction. Normal  left ventricular internal dimensions and wall thicknesses.  Right Heart: Severe right atrial enlargement. Unable to  accurately evaluate right ventricular size or systolic  function. Normal tricuspid valve. Moderate tricuspid  regurgitation. Normal pulmonic valve.   Mild-moderate  pulmonic regurgitation.  Pericardium/PleuraNormal pericardium with no pericardial  effusion.  Hemodynamic: Estimated right ventricular systolic pressure  equals 51 mm Hg, assuming right atrial pressure equals 10  mm Hg, consistent with moderate pulmonary hypertension.  ------------------------------------------------------------------------  CONCLUSIONS:  1. Mitral annular calcification, otherwise normal mitral  valve. Mild mitral regurgitation.  2. Severely dilated left atrium.  LA volume index = 50  cc/m2.  3. Normal left ventricular internal dimensions and wall  thicknesses.  4. Endocardium not well visualized; grossly mild global  left ventricular systolic dysfunction.  5. Severe right atrial enlargement.  6. Unable to accurately evaluate right ventricular size or  systolic function.    < end of copied text >

## 2019-05-13 ENCOUNTER — OUTPATIENT (OUTPATIENT)
Dept: OUTPATIENT SERVICES | Facility: HOSPITAL | Age: 84
LOS: 1 days | End: 2019-05-13

## 2019-05-13 VITALS
DIASTOLIC BLOOD PRESSURE: 60 MMHG | WEIGHT: 177.91 LBS | HEART RATE: 71 BPM | SYSTOLIC BLOOD PRESSURE: 98 MMHG | HEIGHT: 60 IN | RESPIRATION RATE: 16 BRPM | TEMPERATURE: 98 F

## 2019-05-13 DIAGNOSIS — E11.9 TYPE 2 DIABETES MELLITUS WITHOUT COMPLICATIONS: ICD-10-CM

## 2019-05-13 DIAGNOSIS — Z98.890 OTHER SPECIFIED POSTPROCEDURAL STATES: Chronic | ICD-10-CM

## 2019-05-13 DIAGNOSIS — T14.8XXA OTHER INJURY OF UNSPECIFIED BODY REGION, INITIAL ENCOUNTER: Chronic | ICD-10-CM

## 2019-05-13 DIAGNOSIS — Z98.51 TUBAL LIGATION STATUS: Chronic | ICD-10-CM

## 2019-05-13 DIAGNOSIS — L03.116 CELLULITIS OF LEFT LOWER LIMB: ICD-10-CM

## 2019-05-13 DIAGNOSIS — Z90.89 ACQUIRED ABSENCE OF OTHER ORGANS: Chronic | ICD-10-CM

## 2019-05-13 DIAGNOSIS — Z96.651 PRESENCE OF RIGHT ARTIFICIAL KNEE JOINT: Chronic | ICD-10-CM

## 2019-05-13 LAB
ANION GAP SERPL CALC-SCNC: 9 MMO/L — SIGNIFICANT CHANGE UP (ref 7–14)
BASOPHILS # BLD AUTO: 0.02 K/UL — SIGNIFICANT CHANGE UP (ref 0–0.2)
BASOPHILS NFR BLD AUTO: 0.6 % — SIGNIFICANT CHANGE UP (ref 0–2)
BUN SERPL-MCNC: 13 MG/DL — SIGNIFICANT CHANGE UP (ref 7–23)
CALCIUM SERPL-MCNC: 9.9 MG/DL — SIGNIFICANT CHANGE UP (ref 8.4–10.5)
CHLORIDE SERPL-SCNC: 104 MMOL/L — SIGNIFICANT CHANGE UP (ref 98–107)
CO2 SERPL-SCNC: 25 MMOL/L — SIGNIFICANT CHANGE UP (ref 22–31)
CREAT SERPL-MCNC: 0.67 MG/DL — SIGNIFICANT CHANGE UP (ref 0.5–1.3)
EOSINOPHIL # BLD AUTO: 0.12 K/UL — SIGNIFICANT CHANGE UP (ref 0–0.5)
EOSINOPHIL NFR BLD AUTO: 3.5 % — SIGNIFICANT CHANGE UP (ref 0–6)
GLUCOSE SERPL-MCNC: 96 MG/DL — SIGNIFICANT CHANGE UP (ref 70–99)
HBA1C BLD-MCNC: 5.8 % — HIGH (ref 4–5.6)
HCT VFR BLD CALC: 30.3 % — LOW (ref 34.5–45)
HGB BLD-MCNC: 8.9 G/DL — LOW (ref 11.5–15.5)
IMM GRANULOCYTES NFR BLD AUTO: 0.3 % — SIGNIFICANT CHANGE UP (ref 0–1.5)
LYMPHOCYTES # BLD AUTO: 1.1 K/UL — SIGNIFICANT CHANGE UP (ref 1–3.3)
LYMPHOCYTES # BLD AUTO: 32.4 % — SIGNIFICANT CHANGE UP (ref 13–44)
MCHC RBC-ENTMCNC: 26.1 PG — LOW (ref 27–34)
MCHC RBC-ENTMCNC: 29.4 % — LOW (ref 32–36)
MCV RBC AUTO: 88.9 FL — SIGNIFICANT CHANGE UP (ref 80–100)
MONOCYTES # BLD AUTO: 0.32 K/UL — SIGNIFICANT CHANGE UP (ref 0–0.9)
MONOCYTES NFR BLD AUTO: 9.4 % — SIGNIFICANT CHANGE UP (ref 2–14)
NEUTROPHILS # BLD AUTO: 1.83 K/UL — SIGNIFICANT CHANGE UP (ref 1.8–7.4)
NEUTROPHILS NFR BLD AUTO: 53.8 % — SIGNIFICANT CHANGE UP (ref 43–77)
NRBC # FLD: 0 K/UL — SIGNIFICANT CHANGE UP (ref 0–0)
PLATELET # BLD AUTO: 350 K/UL — SIGNIFICANT CHANGE UP (ref 150–400)
PMV BLD: 9.7 FL — SIGNIFICANT CHANGE UP (ref 7–13)
POTASSIUM SERPL-MCNC: 4.7 MMOL/L — SIGNIFICANT CHANGE UP (ref 3.5–5.3)
POTASSIUM SERPL-SCNC: 4.7 MMOL/L — SIGNIFICANT CHANGE UP (ref 3.5–5.3)
RBC # BLD: 3.41 M/UL — LOW (ref 3.8–5.2)
RBC # FLD: 21.1 % — HIGH (ref 10.3–14.5)
SODIUM SERPL-SCNC: 138 MMOL/L — SIGNIFICANT CHANGE UP (ref 135–145)
WBC # BLD: 3.4 K/UL — LOW (ref 3.8–10.5)
WBC # FLD AUTO: 3.4 K/UL — LOW (ref 3.8–10.5)

## 2019-05-13 RX ORDER — METFORMIN HYDROCHLORIDE 850 MG/1
1 TABLET ORAL
Qty: 0 | Refills: 0 | DISCHARGE

## 2019-05-13 RX ORDER — RISEDRONATE SODIUM 25.8; 4.2 MG/1; MG/1
1 TABLET, FILM COATED ORAL
Qty: 0 | Refills: 0 | DISCHARGE

## 2019-05-13 RX ORDER — CHOLECALCIFEROL (VITAMIN D3) 125 MCG
1 CAPSULE ORAL
Qty: 0 | Refills: 0 | DISCHARGE

## 2019-05-13 NOTE — H&P PST ADULT - NSICDXPROBLEM_GEN_ALL_CORE_FT
PROBLEM DIAGNOSES  Problem: Cellulitis of left lower limb  Assessment and Plan: Scheduled for split thickness skin graft on 05/14/19. Pre op instructions, famotidine given and explained. Pt verbalized understanding.   Pending medical evaluation as per surgeon's requests    Problem: Diabetes mellitus  Assessment and Plan: Monitor BS on day of surgery. Pt instructed not to take diabetes medications on day of surgery. Pt verbalized understanding.

## 2019-05-13 NOTE — H&P PST ADULT - NEGATIVE GASTROINTESTINAL SYMPTOMS
no change in bowel habits/no abdominal pain/no melena/no jaundice/no hiccoughs/no diarrhea/no nausea/no vomiting/no constipation

## 2019-05-13 NOTE — H&P PST ADULT - NSANTHOSAYNRD_GEN_A_CORE
No. DEMARCUS screening performed.  STOP BANG Legend: 0-2 = LOW Risk; 3-4 = INTERMEDIATE Risk; 5-8 = HIGH Risk

## 2019-05-13 NOTE — H&P PST ADULT - HISTORY OF PRESENT ILLNESS
83 y.o.  female with PMH DM, Afib on Eliquis (last taken 2 days ago), aortic stenosis,   s/p fall from a stool in her kitchen on Feb 9 and was impaled on her back on a kitchen appliance for 48 hours before her daughter found her, hospitalized at [facility] for acute blood loss anemia due to hematoma and had a hemoglobin of 6 (baseline around 9) requiring 3 units of pRBC, seen by surgeon Dr. Dorian Lazaro and had wound VAc placed to her left back and LLE (wound VAC LLE removed prior to discharge to rehab 4 days ago, who now presents with wound dehiscence LLE and low grade fever 99F at rehab and was started on zosyn. Patient now presents on 5/2 to undergo a split thickness sking grafting procedure to the left lower extremity wound by Dr. Smith.  LLE open wound   Surgery was postponed , started on Metoprolol  S/P fall in the kitchen 83 y.o.  female with PMH: DM, Afib on Eliquis, aortic stenosis, Dyslipidemia presents to PST for pre op evaluation s/p fall from a stool in her kitchen on Feb 9. Pre op diagnosis: Cellulitis of left lower limb. Pt was initially scheduled for split thickness skin grafting procedure to the left lower extremity wound by Dr. Smith. AS per pt, surgery was postponed due to , started on Metoprolol. Now scheduled for split thickness skin graft on 05/14/19.

## 2019-05-13 NOTE — H&P PST ADULT - NEGATIVE CARDIOVASCULAR SYMPTOMS
no paroxysmal nocturnal dyspnea/no palpitations/no orthopnea/no chest pain/no dyspnea on exertion/no claudication

## 2019-05-13 NOTE — H&P PST ADULT - NEUROLOGICAL DETAILS
responds to pain/alert and oriented x 3/responds to verbal commands/sensation intact/cranial nerves intact

## 2019-05-13 NOTE — H&P PST ADULT - RS GEN PE MLT RESP DETAILS PC
no rhonchi/no wheezes/breath sounds equal/good air movement/no chest wall tenderness/no intercostal retractions/no rales/airway patent/clear to auscultation bilaterally/respirations non-labored/no subcutaneous emphysema

## 2019-05-13 NOTE — H&P PST ADULT - MUSCULOSKELETAL
details… detailed exam no joint warmth/no joint erythema/joint swelling/diminished strength/no calf tenderness/knee/decreased ROM

## 2019-05-13 NOTE — H&P PST ADULT - NEGATIVE SKIN SYMPTOMS
no change in size/color of mole/no tumor/no itching/no hair loss/no rash/no dryness/no brittle nails

## 2019-05-13 NOTE — H&P PST ADULT - NSICDXPASTMEDICALHX_GEN_ALL_CORE_FT
PAST MEDICAL HISTORY:  Atrial fibrillation, unspecified type     Diabetes     Lumbar herniated disc     Peripheral edema

## 2019-05-13 NOTE — H&P PST ADULT - NSICDXPASTSURGICALHX_GEN_ALL_CORE_FT
PAST SURGICAL HISTORY:  H/O total knee replacement, right 2014    History of dilation and curettage 1990s    History of lumpectomy 2008    Injury due to foreign body Removal of foreign body impaled to back in 2019    S/P debridement LLE wound, 2019    Surgical history of tubal ligation 1969 PAST SURGICAL HISTORY:  H/O total knee replacement, right 2014    History of dilation and curettage 1990s    History of lumpectomy 2008    History of tonsillectomy     Injury due to foreign body Removal of foreign body impaled to back in 2019    S/P debridement LLE wound, 2019    Surgical history of tubal ligation 1969

## 2019-05-13 NOTE — H&P PST ADULT - NEGATIVE OPHTHALMOLOGIC SYMPTOMS
no discharge R/no diplopia/no blurred vision R/no blurred vision L/no pain R/no loss of vision L/no pain L/no irritation L/no loss of vision R/no photophobia/no discharge L/no lacrimation L/no lacrimation R/no irritation R

## 2019-05-13 NOTE — H&P PST ADULT - NEGATIVE GENERAL GENITOURINARY SYMPTOMS
no flank pain L/no urine discoloration/no gas in urine/no urinary hesitancy/normal urinary frequency/no renal colic/no bladder infections/no dysuria/no hematuria/no flank pain R

## 2019-05-14 ENCOUNTER — OUTPATIENT (OUTPATIENT)
Dept: OUTPATIENT SERVICES | Facility: HOSPITAL | Age: 84
LOS: 1 days | Discharge: HOME | End: 2019-05-14
Payer: MEDICARE

## 2019-05-14 VITALS
OXYGEN SATURATION: 100 % | RESPIRATION RATE: 20 BRPM | TEMPERATURE: 98 F | SYSTOLIC BLOOD PRESSURE: 98 MMHG | DIASTOLIC BLOOD PRESSURE: 60 MMHG | HEART RATE: 81 BPM

## 2019-05-14 VITALS
RESPIRATION RATE: 18 BRPM | WEIGHT: 177.91 LBS | OXYGEN SATURATION: 100 % | SYSTOLIC BLOOD PRESSURE: 118 MMHG | HEIGHT: 60 IN | DIASTOLIC BLOOD PRESSURE: 75 MMHG | HEART RATE: 77 BPM | TEMPERATURE: 98 F

## 2019-05-14 DIAGNOSIS — Z90.89 ACQUIRED ABSENCE OF OTHER ORGANS: Chronic | ICD-10-CM

## 2019-05-14 DIAGNOSIS — Z98.890 OTHER SPECIFIED POSTPROCEDURAL STATES: Chronic | ICD-10-CM

## 2019-05-14 DIAGNOSIS — Z96.651 PRESENCE OF RIGHT ARTIFICIAL KNEE JOINT: Chronic | ICD-10-CM

## 2019-05-14 DIAGNOSIS — L03.116 CELLULITIS OF LEFT LOWER LIMB: ICD-10-CM

## 2019-05-14 DIAGNOSIS — Z98.51 TUBAL LIGATION STATUS: Chronic | ICD-10-CM

## 2019-05-14 DIAGNOSIS — T14.8XXA OTHER INJURY OF UNSPECIFIED BODY REGION, INITIAL ENCOUNTER: Chronic | ICD-10-CM

## 2019-05-14 PROBLEM — R60.9 EDEMA, UNSPECIFIED: Chronic | Status: ACTIVE | Noted: 2019-05-13

## 2019-05-14 PROBLEM — M51.26 OTHER INTERVERTEBRAL DISC DISPLACEMENT, LUMBAR REGION: Chronic | Status: ACTIVE | Noted: 2019-05-13

## 2019-05-14 LAB — GLUCOSE BLDC GLUCOMTR-MCNC: 91 MG/DL — SIGNIFICANT CHANGE UP (ref 70–99)

## 2019-05-14 PROCEDURE — 15100 SPLT AGRFT T/A/L 1ST 100SQCM: CPT

## 2019-05-14 RX ORDER — VANCOMYCIN HCL 1 G
1 VIAL (EA) INTRAVENOUS
Qty: 0 | Refills: 0 | DISCHARGE

## 2019-05-14 RX ORDER — APIXABAN 2.5 MG/1
1 TABLET, FILM COATED ORAL
Qty: 0 | Refills: 0 | DISCHARGE

## 2019-05-14 RX ORDER — METFORMIN HYDROCHLORIDE 850 MG/1
1 TABLET ORAL
Qty: 0 | Refills: 0 | DISCHARGE

## 2019-05-14 RX ORDER — EZETIMIBE AND SIMVASTATIN 10; 80 MG/1; MG/1
1 TABLET, FILM COATED ORAL
Qty: 0 | Refills: 0 | DISCHARGE

## 2019-05-14 NOTE — ASU DISCHARGE PLAN (ADULT/PEDIATRIC) - CARE PROVIDER_API CALL
Benjamin Smith)  Surgery  [facility]  [address], [address]  [place] [address]  Phone: [phone]  Fax: [phone]  Follow Up Time:

## 2019-05-14 NOTE — BRIEF OPERATIVE NOTE - NSICDXBRIEFPROCEDURE_GEN_ALL_CORE_FT
PROCEDURES:  Debridement, lower extremity, with STSG application 14-May-2019 14:28:40  Carolina Adrian

## 2019-05-14 NOTE — ASU PREOPERATIVE ASSESSMENT, ADULT (IPARK ONLY) - FALL HARM RISK
surgery/bones(Osteoporosis,prev fx,steroid use,metastatic bone ca/coagulation(Bleeding disorder R/T clinical cond/anti-coags)

## 2019-05-14 NOTE — ASU DISCHARGE PLAN (ADULT/PEDIATRIC) - PAIN MANAGEMENT
Prescriptions electronically submitted to pharmacy from doctor's office/Take over the counter pain medication

## 2019-05-14 NOTE — BRIEF OPERATIVE NOTE - NSICDXBRIEFPREOP_GEN_ALL_CORE_FT
PRE-OP DIAGNOSIS:  Non-healing wound of lower extremity, left, initial encounter 14-May-2019 14:29:07  Carolina Adrian

## 2019-05-20 ENCOUNTER — MOBILE ON CALL (OUTPATIENT)
Age: 84
End: 2019-05-20

## 2019-05-21 ENCOUNTER — APPOINTMENT (OUTPATIENT)
Dept: PLASTIC SURGERY | Facility: CLINIC | Age: 84
End: 2019-05-21
Payer: MEDICARE

## 2019-05-21 PROCEDURE — 99024 POSTOP FOLLOW-UP VISIT: CPT

## 2019-05-24 ENCOUNTER — APPOINTMENT (OUTPATIENT)
Dept: PLASTIC SURGERY | Facility: CLINIC | Age: 84
End: 2019-05-24
Payer: MEDICARE

## 2019-05-24 DIAGNOSIS — S80.10XA CONTUSION OF UNSPECIFIED LOWER LEG, INITIAL ENCOUNTER: ICD-10-CM

## 2019-05-24 PROCEDURE — 99024 POSTOP FOLLOW-UP VISIT: CPT

## 2019-05-27 NOTE — PHYSICAL EXAM
[NI] : Normal [de-identified] : NAD [de-identified] : horacio removed skin graft 100% taken. small amount of discharged and erythema surrounding the area.

## 2019-05-27 NOTE — HISTORY OF PRESENT ILLNESS
[FreeTextEntry1] : 83-year-old female status post split thickness skin graft. Patient presents today for removal of bolster. The patient yesterday developed some fever and pain at the site of the skin graft. Small amount of drainage

## 2019-05-27 NOTE — REASON FOR VISIT
[Post Op: _________] : a [unfilled] post op visit [Family Member] : family member [FreeTextEntry1] : DOS: 05/02/2019 s/p split thickness graft.

## 2019-05-31 ENCOUNTER — APPOINTMENT (OUTPATIENT)
Dept: PLASTIC SURGERY | Facility: CLINIC | Age: 84
End: 2019-05-31

## 2019-05-31 NOTE — REASON FOR VISIT
[Post Op: _________] : a [unfilled] post op visit [FreeTextEntry1] : DOS 05/14/19 s/p split thickness skin to left lower leg.

## 2019-06-11 ENCOUNTER — APPOINTMENT (OUTPATIENT)
Dept: PLASTIC SURGERY | Facility: CLINIC | Age: 84
End: 2019-06-11

## 2019-06-11 NOTE — REASON FOR VISIT
[Follow-Up: _____] : a [unfilled] follow-up visit [Family Member] : family member [FreeTextEntry1] : DOS 05/14/19 s/p split thickness skin to left lower leg. Pt states she is doing well, has no complaints.

## 2019-06-23 NOTE — REASON FOR VISIT
[Family Member] : family member [Post Op: _________] : a [unfilled] post op visit [FreeTextEntry1] : DOS: 05/02/2019 s/p split thickness graft.

## 2020-07-15 ENCOUNTER — APPOINTMENT (OUTPATIENT)
Dept: VASCULAR SURGERY | Facility: CLINIC | Age: 85
End: 2020-07-15
Payer: MEDICARE

## 2020-07-15 VITALS
TEMPERATURE: 98.5 F | HEIGHT: 60 IN | SYSTOLIC BLOOD PRESSURE: 126 MMHG | HEART RATE: 83 BPM | DIASTOLIC BLOOD PRESSURE: 79 MMHG | WEIGHT: 180 LBS | BODY MASS INDEX: 35.34 KG/M2

## 2020-07-15 DIAGNOSIS — L85.3 XEROSIS CUTIS: ICD-10-CM

## 2020-07-15 PROCEDURE — 93970 EXTREMITY STUDY: CPT

## 2020-07-15 PROCEDURE — 99214 OFFICE O/P EST MOD 30 MIN: CPT

## 2020-07-15 PROCEDURE — 93979 VASCULAR STUDY: CPT

## 2020-08-04 ENCOUNTER — TRANSCRIPTION ENCOUNTER (OUTPATIENT)
Age: 85
End: 2020-08-04

## 2020-08-06 ENCOUNTER — APPOINTMENT (OUTPATIENT)
Dept: VASCULAR SURGERY | Facility: CLINIC | Age: 85
End: 2020-08-06
Payer: MEDICARE

## 2020-08-06 PROCEDURE — 99213 OFFICE O/P EST LOW 20 MIN: CPT | Mod: 95

## 2020-09-10 NOTE — PHYSICAL THERAPY INITIAL EVALUATION ADULT - AMBULATION SKILLS, REHAB EVAL
How Severe Are Your Spot(S)?: mild
What Type Of Note Output Would You Prefer (Optional)?: Standard Output
What Is The Reason For Today's Visit?: Full Body Skin Examination
What Is The Reason For Today's Visit? (Being Monitored For X): concerning skin lesions on an annual basis
straight cane/needs device/independent

## 2020-09-16 ENCOUNTER — APPOINTMENT (OUTPATIENT)
Dept: VASCULAR SURGERY | Facility: CLINIC | Age: 85
End: 2020-09-16
Payer: MEDICARE

## 2020-09-16 VITALS
HEART RATE: 79 BPM | BODY MASS INDEX: 35.34 KG/M2 | SYSTOLIC BLOOD PRESSURE: 131 MMHG | HEIGHT: 60 IN | WEIGHT: 180 LBS | DIASTOLIC BLOOD PRESSURE: 80 MMHG

## 2020-09-16 PROCEDURE — 99213 OFFICE O/P EST LOW 20 MIN: CPT

## 2020-09-30 ENCOUNTER — APPOINTMENT (OUTPATIENT)
Dept: VASCULAR SURGERY | Facility: CLINIC | Age: 85
End: 2020-09-30

## 2020-11-04 ENCOUNTER — APPOINTMENT (OUTPATIENT)
Dept: VASCULAR SURGERY | Facility: CLINIC | Age: 85
End: 2020-11-04
Payer: MEDICARE

## 2020-11-04 VITALS
WEIGHT: 169 LBS | DIASTOLIC BLOOD PRESSURE: 87 MMHG | HEART RATE: 85 BPM | SYSTOLIC BLOOD PRESSURE: 135 MMHG | TEMPERATURE: 98 F | HEIGHT: 60 IN | BODY MASS INDEX: 33.18 KG/M2

## 2020-11-04 PROCEDURE — 29580 STRAPPING UNNA BOOT: CPT | Mod: RT

## 2020-11-11 ENCOUNTER — APPOINTMENT (OUTPATIENT)
Dept: VASCULAR SURGERY | Facility: CLINIC | Age: 85
End: 2020-11-11
Payer: MEDICARE

## 2020-11-11 VITALS
HEART RATE: 55 BPM | SYSTOLIC BLOOD PRESSURE: 139 MMHG | HEIGHT: 60 IN | BODY MASS INDEX: 33.18 KG/M2 | WEIGHT: 169 LBS | TEMPERATURE: 97.7 F | DIASTOLIC BLOOD PRESSURE: 94 MMHG

## 2020-11-11 PROCEDURE — 29580 STRAPPING UNNA BOOT: CPT

## 2020-11-25 ENCOUNTER — APPOINTMENT (OUTPATIENT)
Dept: VASCULAR SURGERY | Facility: CLINIC | Age: 85
End: 2020-11-25
Payer: MEDICARE

## 2020-11-25 VITALS
DIASTOLIC BLOOD PRESSURE: 69 MMHG | WEIGHT: 170 LBS | BODY MASS INDEX: 33.38 KG/M2 | HEIGHT: 60 IN | TEMPERATURE: 98.1 F | SYSTOLIC BLOOD PRESSURE: 117 MMHG | HEART RATE: 89 BPM

## 2020-11-25 PROCEDURE — 29580 STRAPPING UNNA BOOT: CPT | Mod: RT

## 2020-12-09 ENCOUNTER — APPOINTMENT (OUTPATIENT)
Dept: VASCULAR SURGERY | Facility: CLINIC | Age: 85
End: 2020-12-09
Payer: MEDICARE

## 2020-12-09 VITALS
DIASTOLIC BLOOD PRESSURE: 80 MMHG | HEART RATE: 98 BPM | BODY MASS INDEX: 57.52 KG/M2 | SYSTOLIC BLOOD PRESSURE: 134 MMHG | WEIGHT: 293 LBS | HEIGHT: 60 IN

## 2020-12-09 DIAGNOSIS — I83.893 VARICOSE VEINS OF BILATERAL LOWER EXTREMITIES WITH OTHER COMPLICATIONS: ICD-10-CM

## 2020-12-09 PROCEDURE — 99213 OFFICE O/P EST LOW 20 MIN: CPT

## 2021-02-17 ENCOUNTER — APPOINTMENT (OUTPATIENT)
Dept: VASCULAR SURGERY | Facility: CLINIC | Age: 86
End: 2021-02-17

## 2021-08-27 RX ORDER — AMMONIUM LACTATE 12 %
12 CREAM (GRAM) TOPICAL TWICE DAILY
Qty: 280 | Refills: 6 | Status: ACTIVE | COMMUNITY
Start: 2020-07-15 | End: 1900-01-01

## 2021-12-21 ENCOUNTER — TRANSCRIPTION ENCOUNTER (OUTPATIENT)
Age: 86
End: 2021-12-21

## 2022-05-04 ENCOUNTER — APPOINTMENT (OUTPATIENT)
Dept: MAMMOGRAPHY | Facility: CLINIC | Age: 87
End: 2022-05-04
Payer: MEDICARE

## 2022-05-04 ENCOUNTER — APPOINTMENT (OUTPATIENT)
Dept: ULTRASOUND IMAGING | Facility: CLINIC | Age: 87
End: 2022-05-04
Payer: MEDICARE

## 2022-05-04 PROCEDURE — 77063 BREAST TOMOSYNTHESIS BI: CPT

## 2022-05-04 PROCEDURE — 76641 ULTRASOUND BREAST COMPLETE: CPT | Mod: 50

## 2022-05-04 PROCEDURE — 77067 SCR MAMMO BI INCL CAD: CPT

## 2022-11-14 ENCOUNTER — APPOINTMENT (OUTPATIENT)
Dept: UROGYNECOLOGY | Facility: CLINIC | Age: 87
End: 2022-11-14

## 2023-04-05 NOTE — H&P PST ADULT - GENERAL GENITOURINARY SYMPTOMS
BP after 10 Min, 156/68.  RX changed to Valsatan because of side effects.  The only sx that pt noticed was slight swelling in legs. Denies headaches or chest pain.  Talked to DAVID Rg. OK to go. Has an apt with PCP in 2 weeks. Call us if more sx come up.   Pt is on jury duty all of April so he is not sure if he is going to make his apt. He will call us.  I told him to make a BP only apt that week, or come in, if he gets called into jury duty.    void once at night

## 2023-10-01 ENCOUNTER — OUTPATIENT (OUTPATIENT)
Dept: OUTPATIENT SERVICES | Facility: HOSPITAL | Age: 88
LOS: 1 days | Discharge: HOME | End: 2023-10-01

## 2023-10-01 DIAGNOSIS — Z98.890 OTHER SPECIFIED POSTPROCEDURAL STATES: Chronic | ICD-10-CM

## 2023-10-01 DIAGNOSIS — Z98.51 TUBAL LIGATION STATUS: Chronic | ICD-10-CM

## 2023-10-01 DIAGNOSIS — T14.8XXA OTHER INJURY OF UNSPECIFIED BODY REGION, INITIAL ENCOUNTER: Chronic | ICD-10-CM

## 2023-10-01 DIAGNOSIS — Z96.651 PRESENCE OF RIGHT ARTIFICIAL KNEE JOINT: Chronic | ICD-10-CM

## 2023-10-01 DIAGNOSIS — Z90.89 ACQUIRED ABSENCE OF OTHER ORGANS: Chronic | ICD-10-CM

## 2023-10-01 DIAGNOSIS — C50.919 MALIGNANT NEOPLASM OF UNSPECIFIED SITE OF UNSPECIFIED FEMALE BREAST: ICD-10-CM

## 2023-10-03 ENCOUNTER — RESULT REVIEW (OUTPATIENT)
Age: 88
End: 2023-10-03

## 2023-10-03 ENCOUNTER — APPOINTMENT (OUTPATIENT)
Dept: HEMATOLOGY ONCOLOGY | Facility: CLINIC | Age: 88
End: 2023-10-03
Payer: MEDICARE

## 2023-10-03 VITALS
DIASTOLIC BLOOD PRESSURE: 72 MMHG | WEIGHT: 182.32 LBS | SYSTOLIC BLOOD PRESSURE: 127 MMHG | HEIGHT: 60 IN | HEART RATE: 67 BPM | BODY MASS INDEX: 35.79 KG/M2 | OXYGEN SATURATION: 99 % | RESPIRATION RATE: 16 BRPM | TEMPERATURE: 97 F

## 2023-10-03 DIAGNOSIS — Z78.9 OTHER SPECIFIED HEALTH STATUS: ICD-10-CM

## 2023-10-03 DIAGNOSIS — Z63.4 DISAPPEARANCE AND DEATH OF FAMILY MEMBER: ICD-10-CM

## 2023-10-03 DIAGNOSIS — I50.9 HEART FAILURE, UNSPECIFIED: ICD-10-CM

## 2023-10-03 DIAGNOSIS — E03.9 HYPOTHYROIDISM, UNSPECIFIED: ICD-10-CM

## 2023-10-03 DIAGNOSIS — G56.03 CARPAL TUNNEL SYNDROM,BILATERAL UPPER LIMBS: ICD-10-CM

## 2023-10-03 DIAGNOSIS — Z83.2 FAMILY HISTORY OF DISEASES OF THE BLOOD AND BLOOD-FORMING ORGANS AND CERTAIN DISORDERS INVOLVING THE IMMUNE MECHANISM: ICD-10-CM

## 2023-10-03 LAB
ANISOCYTOSIS BLD QL: SLIGHT — SIGNIFICANT CHANGE UP
BASOPHILS # BLD AUTO: 0 K/UL — SIGNIFICANT CHANGE UP (ref 0–0.2)
BASOPHILS NFR BLD AUTO: 0 % — SIGNIFICANT CHANGE UP (ref 0–2)
ELLIPTOCYTES BLD QL SMEAR: SLIGHT — SIGNIFICANT CHANGE UP
EOSINOPHIL # BLD AUTO: 0.03 K/UL — SIGNIFICANT CHANGE UP (ref 0–0.5)
EOSINOPHIL NFR BLD AUTO: 1 % — SIGNIFICANT CHANGE UP (ref 0–6)
HCT VFR BLD CALC: 36.1 % — SIGNIFICANT CHANGE UP (ref 34.5–45)
HGB BLD-MCNC: 11.6 G/DL — SIGNIFICANT CHANGE UP (ref 11.5–15.5)
LYMPHOCYTES # BLD AUTO: 0.95 K/UL — LOW (ref 1–3.3)
LYMPHOCYTES # BLD AUTO: 35 % — SIGNIFICANT CHANGE UP (ref 13–44)
MACROCYTES BLD QL: SLIGHT — SIGNIFICANT CHANGE UP
MCHC RBC-ENTMCNC: 32.1 G/DL — SIGNIFICANT CHANGE UP (ref 32–36)
MCHC RBC-ENTMCNC: 32.3 PG — SIGNIFICANT CHANGE UP (ref 27–34)
MCV RBC AUTO: 100.6 FL — HIGH (ref 80–100)
MONOCYTES # BLD AUTO: 0.35 K/UL — SIGNIFICANT CHANGE UP (ref 0–0.9)
MONOCYTES NFR BLD AUTO: 13 % — SIGNIFICANT CHANGE UP (ref 2–14)
NEUTROPHILS # BLD AUTO: 1.38 K/UL — LOW (ref 1.8–7.4)
NEUTROPHILS NFR BLD AUTO: 51 % — SIGNIFICANT CHANGE UP (ref 43–77)
NRBC # BLD: 0 /100 — SIGNIFICANT CHANGE UP (ref 0–0)
NRBC # BLD: SIGNIFICANT CHANGE UP /100 WBCS (ref 0–0)
PLAT MORPH BLD: NORMAL — SIGNIFICANT CHANGE UP
PLATELET # BLD AUTO: 144 K/UL — LOW (ref 150–400)
POIKILOCYTOSIS BLD QL AUTO: SLIGHT — SIGNIFICANT CHANGE UP
RBC # BLD: 3.59 M/UL — LOW (ref 3.8–5.2)
RBC # FLD: 15.6 % — HIGH (ref 10.3–14.5)
RBC BLD AUTO: ABNORMAL
SCHISTOCYTES BLD QL AUTO: SLIGHT — SIGNIFICANT CHANGE UP
WBC # BLD: 2.71 K/UL — LOW (ref 3.8–10.5)
WBC # FLD AUTO: 2.71 K/UL — LOW (ref 3.8–10.5)

## 2023-10-03 PROCEDURE — 99205 OFFICE O/P NEW HI 60 MIN: CPT

## 2023-10-03 PROCEDURE — G2212 PROLONG OUTPT/OFFICE VIS: CPT

## 2023-10-03 RX ORDER — SILVER SULFADIAZINE 10 MG/G
1 CREAM TOPICAL
Qty: 1 | Refills: 4 | Status: DISCONTINUED | COMMUNITY
Start: 2020-07-15 | End: 2023-10-03

## 2023-10-03 RX ORDER — SULFAMETHOXAZOLE AND TRIMETHOPRIM 800; 160 MG/1; MG/1
800-160 TABLET ORAL TWICE DAILY
Qty: 14 | Refills: 0 | Status: DISCONTINUED | COMMUNITY
Start: 2019-05-20 | End: 2023-10-03

## 2023-10-03 RX ORDER — OXYCODONE AND ACETAMINOPHEN 5; 325 MG/1; MG/1
5-325 TABLET ORAL
Qty: 24 | Refills: 0 | Status: DISCONTINUED | COMMUNITY
Start: 2019-05-14 | End: 2023-10-03

## 2023-10-03 RX ORDER — SULFAMETHOXAZOLE AND TRIMETHOPRIM 800; 160 MG/1; MG/1
800-160 TABLET ORAL TWICE DAILY
Qty: 28 | Refills: 0 | Status: DISCONTINUED | COMMUNITY
Start: 2019-06-05 | End: 2023-10-03

## 2023-10-03 RX ORDER — SILVER/HYDROCOLLOID DRESSING 1.2-3.5X12
BANDAGE TOPICAL
Qty: 1 | Refills: 0 | Status: DISCONTINUED | OUTPATIENT
Start: 2019-05-31 | End: 2023-10-03

## 2023-10-03 RX ORDER — OXYCODONE AND ACETAMINOPHEN 5; 325 MG/1; MG/1
5-325 TABLET ORAL
Qty: 24 | Refills: 0 | Status: DISCONTINUED | COMMUNITY
Start: 2019-05-16 | End: 2023-10-03

## 2023-10-03 SDOH — SOCIAL STABILITY - SOCIAL INSECURITY: DISSAPEARANCE AND DEATH OF FAMILY MEMBER: Z63.4

## 2023-10-04 LAB
ALBUMIN MFR SERPL ELPH: 55 %
ALBUMIN SERPL ELPH-MCNC: 4.5 G/DL
ALBUMIN SERPL-MCNC: 4.3 G/DL
ALBUMIN/GLOB SERPL: 1.2 RATIO
ALP BLD-CCNC: 87 U/L
ALPHA1 GLOB MFR SERPL ELPH: 3.9 %
ALPHA1 GLOB SERPL ELPH-MCNC: 0.3 G/DL
ALPHA2 GLOB MFR SERPL ELPH: 7.7 %
ALPHA2 GLOB SERPL ELPH-MCNC: 0.6 G/DL
ALT SERPL-CCNC: 26 U/L
ANA SER IF-ACNC: NEGATIVE
ANION GAP SERPL CALC-SCNC: 9 MMOL/L
APTT BLD: 39.4 SEC
AST SERPL-CCNC: 36 U/L
B-GLOBULIN MFR SERPL ELPH: 8.6 %
B-GLOBULIN SERPL ELPH-MCNC: 0.7 G/DL
BILIRUB SERPL-MCNC: 0.6 MG/DL
BUN SERPL-MCNC: 21 MG/DL
CALCIUM SERPL-MCNC: 10 MG/DL
CHLORIDE SERPL-SCNC: 104 MMOL/L
CO2 SERPL-SCNC: 24 MMOL/L
CREAT SERPL-MCNC: 0.79 MG/DL
DEPRECATED KAPPA LC FREE/LAMBDA SER: 5.16 RATIO
EGFR: 72 ML/MIN/1.73M2
GAMMA GLOB FLD ELPH-MCNC: 1.9 G/DL
GAMMA GLOB MFR SERPL ELPH: 24.8 %
GLUCOSE SERPL-MCNC: 90 MG/DL
IGA SER QL IEP: 34 MG/DL
IGG SER QL IEP: 2043 MG/DL
IGM SER QL IEP: 28 MG/DL
INR PPP: 1.25 RATIO
INTERPRETATION SERPL IEP-IMP: NORMAL
KAPPA LC CSF-MCNC: 0.79 MG/DL
KAPPA LC SERPL-MCNC: 4.08 MG/DL
LDH SERPL-CCNC: 184 U/L
M PROTEIN MFR SERPL ELPH: 22.3 %
M PROTEIN SPEC IFE-MCNC: NORMAL
MONOCLON BAND OBS SERPL: 1.7 G/DL
POTASSIUM SERPL-SCNC: 4.3 MMOL/L
PROT SERPL-MCNC: 7.8 G/DL
PT BLD: 14 SEC
RHEUMATOID FACT SER QL: 11 IU/ML
SODIUM SERPL-SCNC: 137 MMOL/L

## 2023-10-05 ENCOUNTER — RESULT REVIEW (OUTPATIENT)
Age: 88
End: 2023-10-05

## 2023-10-05 ENCOUNTER — APPOINTMENT (OUTPATIENT)
Dept: ULTRASOUND IMAGING | Facility: CLINIC | Age: 88
End: 2023-10-05
Payer: MEDICARE

## 2023-10-05 ENCOUNTER — APPOINTMENT (OUTPATIENT)
Dept: MAMMOGRAPHY | Facility: CLINIC | Age: 88
End: 2023-10-05
Payer: MEDICARE

## 2023-10-05 LAB
CCP AB SER IA-ACNC: <8 UNITS
RF+CCP IGG SER-IMP: NEGATIVE

## 2023-10-05 PROCEDURE — G0279: CPT

## 2023-10-05 PROCEDURE — 77066 DX MAMMO INCL CAD BI: CPT

## 2023-10-05 PROCEDURE — 76700 US EXAM ABDOM COMPLETE: CPT

## 2023-10-05 PROCEDURE — 76641 ULTRASOUND BREAST COMPLETE: CPT | Mod: 50,3G

## 2023-10-06 PROBLEM — G56.03 BILATERAL CARPAL TUNNEL SYNDROME: Status: ACTIVE | Noted: 2023-10-06

## 2023-10-06 PROBLEM — I50.9 CHRONIC CONGESTIVE HEART FAILURE, UNSPECIFIED HEART FAILURE TYPE: Status: ACTIVE | Noted: 2023-10-06

## 2023-10-06 PROBLEM — E03.9 HYPOTHYROID: Status: ACTIVE | Noted: 2023-10-06

## 2023-10-06 PROBLEM — Z83.2 FAMILY HISTORY OF VON WILLEBRAND DISEASE: Status: ACTIVE | Noted: 2023-10-06

## 2023-10-06 PROBLEM — Z78.9 RARELY CONSUMES ALCOHOL: Status: ACTIVE | Noted: 2023-10-06

## 2023-10-06 PROBLEM — Z63.4 WIDOWED: Status: ACTIVE | Noted: 2023-10-06

## 2023-10-06 RX ORDER — ESCITALOPRAM OXALATE 20 MG/1
20 TABLET ORAL
Refills: 0 | Status: ACTIVE | COMMUNITY
Start: 2023-10-06

## 2023-10-06 RX ORDER — DAPAGLIFLOZIN 5 MG/1
5 TABLET, FILM COATED ORAL
Refills: 0 | Status: ACTIVE | COMMUNITY
Start: 2023-10-06

## 2023-10-06 RX ORDER — ASCORBIC ACID 500 MG
500 TABLET ORAL
Refills: 0 | Status: ACTIVE | COMMUNITY
Start: 2023-10-06

## 2023-10-06 RX ORDER — METOPROLOL TARTRATE 25 MG/1
25 TABLET, FILM COATED ORAL
Refills: 0 | Status: ACTIVE | COMMUNITY
Start: 2023-10-06

## 2023-10-06 RX ORDER — LEVOTHYROXINE SODIUM 25 UG/1
25 CAPSULE ORAL DAILY
Refills: 0 | Status: ACTIVE | COMMUNITY
Start: 2023-10-06

## 2023-10-06 RX ORDER — IBUPROFEN 400 MG
0.4-50-0.1 TABLET ORAL
Refills: 0 | Status: ACTIVE | COMMUNITY
Start: 2023-10-06

## 2023-10-09 ENCOUNTER — RESULT REVIEW (OUTPATIENT)
Age: 88
End: 2023-10-09

## 2023-10-09 ENCOUNTER — NON-APPOINTMENT (OUTPATIENT)
Age: 88
End: 2023-10-09

## 2023-10-09 LAB — ANCA AB SER IF-ACNC: NEGATIVE

## 2023-10-13 ENCOUNTER — OUTPATIENT (OUTPATIENT)
Dept: OUTPATIENT SERVICES | Facility: HOSPITAL | Age: 88
LOS: 1 days | End: 2023-10-13
Payer: MEDICARE

## 2023-10-13 ENCOUNTER — LABORATORY RESULT (OUTPATIENT)
Age: 88
End: 2023-10-13

## 2023-10-13 ENCOUNTER — RESULT REVIEW (OUTPATIENT)
Age: 88
End: 2023-10-13

## 2023-10-13 ENCOUNTER — APPOINTMENT (OUTPATIENT)
Dept: HEMATOLOGY ONCOLOGY | Facility: CLINIC | Age: 88
End: 2023-10-13
Payer: MEDICARE

## 2023-10-13 ENCOUNTER — APPOINTMENT (OUTPATIENT)
Dept: RADIOLOGY | Facility: CLINIC | Age: 88
End: 2023-10-13
Payer: MEDICARE

## 2023-10-13 VITALS
HEART RATE: 79 BPM | WEIGHT: 176.81 LBS | DIASTOLIC BLOOD PRESSURE: 58 MMHG | TEMPERATURE: 97 F | SYSTOLIC BLOOD PRESSURE: 115 MMHG | RESPIRATION RATE: 16 BRPM | OXYGEN SATURATION: 99 % | BODY MASS INDEX: 34.53 KG/M2

## 2023-10-13 DIAGNOSIS — C50.919 MALIGNANT NEOPLASM OF UNSPECIFIED SITE OF UNSPECIFIED FEMALE BREAST: ICD-10-CM

## 2023-10-13 DIAGNOSIS — Z98.890 OTHER SPECIFIED POSTPROCEDURAL STATES: Chronic | ICD-10-CM

## 2023-10-13 DIAGNOSIS — T14.8XXA OTHER INJURY OF UNSPECIFIED BODY REGION, INITIAL ENCOUNTER: Chronic | ICD-10-CM

## 2023-10-13 DIAGNOSIS — Z90.89 ACQUIRED ABSENCE OF OTHER ORGANS: Chronic | ICD-10-CM

## 2023-10-13 DIAGNOSIS — Z98.51 TUBAL LIGATION STATUS: Chronic | ICD-10-CM

## 2023-10-13 LAB
ACANTHOCYTES BLD QL SMEAR: SLIGHT — SIGNIFICANT CHANGE UP
ANISOCYTOSIS BLD QL: SLIGHT — SIGNIFICANT CHANGE UP
BASOPHILS # BLD AUTO: 0.01 K/UL — SIGNIFICANT CHANGE UP (ref 0–0.2)
BASOPHILS NFR BLD AUTO: 0.4 % — SIGNIFICANT CHANGE UP (ref 0–2)
ELLIPTOCYTES BLD QL SMEAR: SLIGHT — SIGNIFICANT CHANGE UP
EOSINOPHIL # BLD AUTO: 0.08 K/UL — SIGNIFICANT CHANGE UP (ref 0–0.5)
EOSINOPHIL NFR BLD AUTO: 3.2 % — SIGNIFICANT CHANGE UP (ref 0–6)
HCT VFR BLD CALC: 38.1 % — SIGNIFICANT CHANGE UP (ref 34.5–45)
HGB BLD-MCNC: 12.1 G/DL — SIGNIFICANT CHANGE UP (ref 11.5–15.5)
IMM GRANULOCYTES NFR BLD AUTO: 0.4 % — SIGNIFICANT CHANGE UP (ref 0–0.9)
LYMPHOCYTES # BLD AUTO: 1 K/UL — SIGNIFICANT CHANGE UP (ref 1–3.3)
LYMPHOCYTES # BLD AUTO: 39.7 % — SIGNIFICANT CHANGE UP (ref 13–44)
MACROCYTES BLD QL: SLIGHT — SIGNIFICANT CHANGE UP
MCHC RBC-ENTMCNC: 31.8 G/DL — LOW (ref 32–36)
MCHC RBC-ENTMCNC: 32 PG — SIGNIFICANT CHANGE UP (ref 27–34)
MCV RBC AUTO: 100.8 FL — HIGH (ref 80–100)
MONOCYTES # BLD AUTO: 0.27 K/UL — SIGNIFICANT CHANGE UP (ref 0–0.9)
MONOCYTES NFR BLD AUTO: 10.7 % — SIGNIFICANT CHANGE UP (ref 2–14)
NEUTROPHILS # BLD AUTO: 1.15 K/UL — LOW (ref 1.8–7.4)
NEUTROPHILS NFR BLD AUTO: 45.6 % — SIGNIFICANT CHANGE UP (ref 43–77)
NRBC # BLD: 0 /100 WBCS — SIGNIFICANT CHANGE UP (ref 0–0)
PLAT MORPH BLD: NORMAL — SIGNIFICANT CHANGE UP
PLATELET # BLD AUTO: 167 K/UL — SIGNIFICANT CHANGE UP (ref 150–400)
POIKILOCYTOSIS BLD QL AUTO: SLIGHT — SIGNIFICANT CHANGE UP
RBC # BLD: 3.78 M/UL — LOW (ref 3.8–5.2)
RBC # FLD: 15.4 % — HIGH (ref 10.3–14.5)
RBC BLD AUTO: ABNORMAL
SCHISTOCYTES BLD QL AUTO: SLIGHT — SIGNIFICANT CHANGE UP
WBC # BLD: 2.52 K/UL — LOW (ref 3.8–10.5)
WBC # FLD AUTO: 2.52 K/UL — LOW (ref 3.8–10.5)

## 2023-10-13 PROCEDURE — 38222 DX BONE MARROW BX & ASPIR: CPT

## 2023-10-13 PROCEDURE — 73090 X-RAY EXAM OF FOREARM: CPT | Mod: 26,LT

## 2023-10-13 PROCEDURE — 73090 X-RAY EXAM OF FOREARM: CPT

## 2023-10-16 LAB
FERRITIN SERPL-MCNC: 34 NG/ML
FOLATE SERPL-MCNC: >20 NG/ML
IRON SATN MFR SERPL: 22 %
IRON SERPL-MCNC: 93 UG/DL
TIBC SERPL-MCNC: 425 UG/DL
TSH SERPL-ACNC: 3.38 UIU/ML
UIBC SERPL-MCNC: 333 UG/DL
VIT B12 SERPL-MCNC: >2000 PG/ML

## 2023-11-10 ENCOUNTER — LABORATORY RESULT (OUTPATIENT)
Age: 88
End: 2023-11-10

## 2023-11-10 ENCOUNTER — APPOINTMENT (OUTPATIENT)
Dept: HEMATOLOGY ONCOLOGY | Facility: CLINIC | Age: 88
End: 2023-11-10

## 2023-11-10 ENCOUNTER — APPOINTMENT (OUTPATIENT)
Dept: HEMATOLOGY ONCOLOGY | Facility: CLINIC | Age: 88
End: 2023-11-10
Payer: MEDICARE

## 2023-11-10 VITALS
OXYGEN SATURATION: 99 % | SYSTOLIC BLOOD PRESSURE: 109 MMHG | WEIGHT: 170.48 LBS | HEART RATE: 76 BPM | TEMPERATURE: 97.9 F | RESPIRATION RATE: 18 BRPM | BODY MASS INDEX: 33.3 KG/M2 | DIASTOLIC BLOOD PRESSURE: 62 MMHG

## 2023-11-10 PROCEDURE — 38222 DX BONE MARROW BX & ASPIR: CPT

## 2024-03-07 ENCOUNTER — OUTPATIENT (OUTPATIENT)
Dept: OUTPATIENT SERVICES | Facility: HOSPITAL | Age: 89
LOS: 1 days | Discharge: HOME | End: 2024-03-07

## 2024-03-07 DIAGNOSIS — Z98.890 OTHER SPECIFIED POSTPROCEDURAL STATES: Chronic | ICD-10-CM

## 2024-03-07 DIAGNOSIS — Z90.89 ACQUIRED ABSENCE OF OTHER ORGANS: Chronic | ICD-10-CM

## 2024-03-07 DIAGNOSIS — C50.919 MALIGNANT NEOPLASM OF UNSPECIFIED SITE OF UNSPECIFIED FEMALE BREAST: ICD-10-CM

## 2024-03-07 DIAGNOSIS — T14.8XXA OTHER INJURY OF UNSPECIFIED BODY REGION, INITIAL ENCOUNTER: Chronic | ICD-10-CM

## 2024-03-07 DIAGNOSIS — Z98.51 TUBAL LIGATION STATUS: Chronic | ICD-10-CM

## 2024-03-07 DIAGNOSIS — Z96.651 PRESENCE OF RIGHT ARTIFICIAL KNEE JOINT: Chronic | ICD-10-CM

## 2024-04-19 ENCOUNTER — NON-APPOINTMENT (OUTPATIENT)
Age: 89
End: 2024-04-19

## 2024-04-19 ENCOUNTER — RESULT REVIEW (OUTPATIENT)
Age: 89
End: 2024-04-19

## 2024-04-19 ENCOUNTER — APPOINTMENT (OUTPATIENT)
Dept: HEMATOLOGY ONCOLOGY | Facility: CLINIC | Age: 89
End: 2024-04-19

## 2024-04-19 ENCOUNTER — APPOINTMENT (OUTPATIENT)
Dept: HEMATOLOGY ONCOLOGY | Facility: CLINIC | Age: 89
End: 2024-04-19
Payer: MEDICARE

## 2024-04-19 ENCOUNTER — LABORATORY RESULT (OUTPATIENT)
Age: 89
End: 2024-04-19

## 2024-04-19 VITALS
DIASTOLIC BLOOD PRESSURE: 88 MMHG | RESPIRATION RATE: 16 BRPM | HEART RATE: 73 BPM | WEIGHT: 181.44 LBS | OXYGEN SATURATION: 99 % | SYSTOLIC BLOOD PRESSURE: 146 MMHG | BODY MASS INDEX: 35.43 KG/M2 | TEMPERATURE: 96.8 F

## 2024-04-19 DIAGNOSIS — D72.819 DECREASED WHITE BLOOD CELL COUNT, UNSPECIFIED: ICD-10-CM

## 2024-04-19 DIAGNOSIS — I48.91 UNSPECIFIED ATRIAL FIBRILLATION: ICD-10-CM

## 2024-04-19 DIAGNOSIS — D75.9 DISEASE OF BLOOD AND BLOOD-FORMING ORGANS, UNSPECIFIED: ICD-10-CM

## 2024-04-19 DIAGNOSIS — Z79.01 LONG TERM (CURRENT) USE OF ANTICOAGULANTS: ICD-10-CM

## 2024-04-19 DIAGNOSIS — D47.2 MONOCLONAL GAMMOPATHY: ICD-10-CM

## 2024-04-19 LAB
BASOPHILS # BLD AUTO: 0.01 K/UL — SIGNIFICANT CHANGE UP (ref 0–0.2)
BASOPHILS NFR BLD AUTO: 0.4 % — SIGNIFICANT CHANGE UP (ref 0–2)
EOSINOPHIL # BLD AUTO: 0.08 K/UL — SIGNIFICANT CHANGE UP (ref 0–0.5)
EOSINOPHIL NFR BLD AUTO: 2.9 % — SIGNIFICANT CHANGE UP (ref 0–6)
HCT VFR BLD CALC: 37 % — SIGNIFICANT CHANGE UP (ref 34.5–45)
HGB BLD-MCNC: 11.4 G/DL — LOW (ref 11.5–15.5)
IMM GRANULOCYTES NFR BLD AUTO: 0 % — SIGNIFICANT CHANGE UP (ref 0–0.9)
LYMPHOCYTES # BLD AUTO: 0.63 K/UL — LOW (ref 1–3.3)
LYMPHOCYTES # BLD AUTO: 22.9 % — SIGNIFICANT CHANGE UP (ref 13–44)
MCHC RBC-ENTMCNC: 30.8 G/DL — LOW (ref 32–36)
MCHC RBC-ENTMCNC: 31.8 PG — SIGNIFICANT CHANGE UP (ref 27–34)
MCV RBC AUTO: 103.4 FL — HIGH (ref 80–100)
MONOCYTES # BLD AUTO: 0.22 K/UL — SIGNIFICANT CHANGE UP (ref 0–0.9)
MONOCYTES NFR BLD AUTO: 8 % — SIGNIFICANT CHANGE UP (ref 2–14)
NEUTROPHILS # BLD AUTO: 1.81 K/UL — SIGNIFICANT CHANGE UP (ref 1.8–7.4)
NEUTROPHILS NFR BLD AUTO: 65.8 % — SIGNIFICANT CHANGE UP (ref 43–77)
NRBC # BLD: 0 /100 WBCS — SIGNIFICANT CHANGE UP (ref 0–0)
PLATELET # BLD AUTO: 223 K/UL — SIGNIFICANT CHANGE UP (ref 150–400)
RBC # BLD: 3.58 M/UL — LOW (ref 3.8–5.2)
RBC # FLD: 15.6 % — HIGH (ref 10.3–14.5)
WBC # BLD: 2.75 K/UL — LOW (ref 3.8–10.5)
WBC # FLD AUTO: 2.75 K/UL — LOW (ref 3.8–10.5)

## 2024-04-19 PROCEDURE — 99215 OFFICE O/P EST HI 40 MIN: CPT

## 2024-04-19 PROCEDURE — G2211 COMPLEX E/M VISIT ADD ON: CPT

## 2024-04-19 RX ORDER — FUROSEMIDE 40 MG/1
40 TABLET ORAL DAILY
Refills: 0 | Status: ACTIVE | COMMUNITY
Start: 2024-04-19

## 2024-04-19 RX ORDER — SILVER SULFADIAZINE 10 MG/G
1 CREAM TOPICAL TWICE DAILY
Qty: 1 | Refills: 2 | Status: DISCONTINUED | COMMUNITY
Start: 2019-05-21 | End: 2024-04-19

## 2024-04-20 ENCOUNTER — RESULT REVIEW (OUTPATIENT)
Age: 89
End: 2024-04-20

## 2024-04-21 ENCOUNTER — RESULT REVIEW (OUTPATIENT)
Age: 89
End: 2024-04-21

## 2024-04-22 LAB
ALBUMIN SERPL ELPH-MCNC: 4.1 G/DL
ALP BLD-CCNC: 118 U/L
ALT SERPL-CCNC: 23 U/L
ANION GAP SERPL CALC-SCNC: 12 MMOL/L
AST SERPL-CCNC: 37 U/L
BILIRUB SERPL-MCNC: 0.6 MG/DL
BUN SERPL-MCNC: 18 MG/DL
CALCIUM SERPL-MCNC: 10 MG/DL
CANCER AG27-29 SERPL-ACNC: 10.4 U/ML
CEA SERPL-MCNC: 1.8 NG/ML
CHLORIDE SERPL-SCNC: 99 MMOL/L
CO2 SERPL-SCNC: 24 MMOL/L
CREAT SERPL-MCNC: 0.81 MG/DL
CRYOGLOB SERPL-MCNC: NEGATIVE
DEPRECATED KAPPA LC FREE/LAMBDA SER: 5.69 RATIO
EGFR: 70 ML/MIN/1.73M2
FERRITIN SERPL-MCNC: 34 NG/ML
GLUCOSE SERPL-MCNC: 91 MG/DL
IGA SER QL IEP: 34 MG/DL
IGG SER QL IEP: 2235 MG/DL
IGM SER QL IEP: 22 MG/DL
IRON SATN MFR SERPL: 15 %
IRON SERPL-MCNC: 57 UG/DL
KAPPA LC CSF-MCNC: 0.85 MG/DL
KAPPA LC SERPL-MCNC: 4.84 MG/DL
LDH SERPL-CCNC: 194 U/L
POTASSIUM SERPL-SCNC: 4.8 MMOL/L
PROT SERPL-MCNC: 7.7 G/DL
SODIUM SERPL-SCNC: 135 MMOL/L
TIBC SERPL-MCNC: 383 UG/DL
UIBC SERPL-MCNC: 326 UG/DL

## 2024-04-23 LAB
ALBUMIN MFR SERPL ELPH: 49.4 %
ALBUMIN SERPL-MCNC: 3.8 G/DL
ALBUMIN/GLOB SERPL: 1 RATIO
ALPHA1 GLOB MFR SERPL ELPH: 4.9 %
ALPHA1 GLOB SERPL ELPH-MCNC: 0.4 G/DL
ALPHA2 GLOB MFR SERPL ELPH: 9 %
ALPHA2 GLOB SERPL ELPH-MCNC: 0.7 G/DL
B-GLOBULIN MFR SERPL ELPH: 9 %
B-GLOBULIN SERPL ELPH-MCNC: 0.7 G/DL
GAMMA GLOB FLD ELPH-MCNC: 2.1 G/DL
GAMMA GLOB MFR SERPL ELPH: 27.7 %
INTERPRETATION SERPL IEP-IMP: NORMAL
M PROTEIN MFR SERPL ELPH: 24.4 %
MONOCLON BAND OBS SERPL: 1.9 G/DL
PROT SERPL-MCNC: 7.7 G/DL
PROT SERPL-MCNC: 7.7 G/DL

## 2024-04-26 LAB
OB PNL STL: NEGATIVE — SIGNIFICANT CHANGE UP

## 2024-04-30 NOTE — HISTORY OF PRESENT ILLNESS
[de-identified] : MGUS IgGk 2008 Breast cancer - lumpectomy/Mammosite RT/Anastrozole x 5 years [de-identified] : NGS: CBL C404Y  (VAF 3%), GNAS R201H [FreeTextEntry1] : MDS Registry [de-identified] : Feels well. She is tired. 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 Gets KRISHNAN after 2 feet. Chronic low back pain is stable. She notes no fevers, headaches, visual problems, night sweats, swollen glands, recurrent infections, sore throat, chest pain, abdominal pain, other skeletal pain, bleeding, bruising, melena, BRBPR, hematuria, vaginal bleeding. Notes bilateral leg swelling. Right leg more swollen. Both respond to Lasix. Had flu, RSV and COVID vaccines in the Fall. Last colonoscopy was about a decade ago.

## 2024-04-30 NOTE — PHYSICAL EXAM
[de-identified] : RRR. S1S2 normal. Gr 2/6 TITI axilla. No gallops. [de-identified] : Inverted right nipple with 5 x 5 cm hard mass just lateral to areola at 9 o'clock. Left breast has 1 x 1 cm nodular mass at 4 o'clock.  [de-identified] : 3 x 3 cm smoothe solid mass on left dorsal arm below elbow.. RLE > LLE . Unna boot RLE

## 2024-04-30 NOTE — CONSULT LETTER
[FreeTextEntry2] : Susan Palleschi, MD [FreeTextEntry3] : Ilan Harris M.D., FACP Professor of Medicine [facility] [employer] [facility] Associate Chief, Division of Hematology [place] [id number] [phone]

## 2024-04-30 NOTE — ADDENDUM
[FreeTextEntry1] : I, Lambert Hernandez, acted solely as a scribe for Dr. Jeffrey Harris on 4/19/2024. All medical entries made by the Scribe were at my, Dr. Jeffrey Harris's, direction and personally dictated by me on 4/19/2024. I have reviewed the chart and agree that the record accurately reflects my personal performance of the history, physical exam, assessment and plan. I have also personally directed, reviewed, and agreed with the chart.

## 2024-04-30 NOTE — ASSESSMENT
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 Iron stores were absent but serum iron studies are normal, hemoglobin low normal and stable and she is not microcytic. Leukopenia is stable and she is not neutropenic. Mammography and sonography did not confirm the palpable breast masses. Right breast mass has enlarged. We will pursue further evaluation of all these issues.   Plan: Observe CMP, LDH, SPEP, Quant Ig, SFLC, ferritin, Iron/TIBC, cryoglobulins, CEA, CA 27.29 Stool hemoccult x 3.  Consider colonoscopy.  Breast surgery f/u  PI-linked antigen, UPEP next visit RTC 6 months

## 2024-04-30 NOTE — RESULTS/DATA
[FreeTextEntry1] : WBC 2,750 Hgb 11.4 Hct 37.0 .4 Platelets 223,000 Diff normal   11/10/23 Bone marrow biopsy and aspirate diagnosis:  -erythroid predominant trilineage hematopoiesis with maturation and iron stores not seen.  -mild perivascular plasmacytosis (monotypic by flow cytometry; less than 10% by  immunohisotchemistry)  Bone marrow aspirate iron stain:   Iron stores are not seen; no ring sideroblasts are seen. Congo red stain is negative.  Flow cytometry final diagnosis:  - Monotypic plasma cells (0.44% of cells), positive for cytoplasmic kappa, CD38, dim , dimmer CD45, CD56, , CD27, CD81; negative CD19, CD20 cytoplasmic lambda. - The lymphocyte immunophenotypic findings show no diagnostic abnormalities with increased hematogones. - The myeloid immunophenotypic findings show normal myeloid granularity, no increase in myeloid immaturity (0.57% myeloblasts with normal immunophenotype), normal myeloid antigen maturation pattern, and increased hematogones (which are reported to be low in myelodysplasia). - Correlation with histology is necessary.  [facility] heme genomic findings: -CBL C404Y -GNAS R201H  FISH result: normal FISH multiple myeloma panel.  Chromosome analysis: normal karyotype. [facility]: abnormal  Tier II: Variants of Potential Clinical Significance:  -CBL [id number]  10/13/23 Ferritin 34 Fe/TIBC/sat% 93/425/22% B12 >2000  Folate >20 TSH 3.38  10/05/23 Diagnostic mammogram/sonogram impression: -No mammographic or sonographic evidence of malignancy. -Status post right lumpectomy with focal evolving fat necrosis seen in the central slightly lateral breast, possibly explaining area of palpable concern. -No suspicious mammographic or sonographic findings identified in the bilateral breast areas of palpable concern. Clinical follow-up is advised.  US abdomen impression: - Mild hepatomegaly. No focal hepatic lesion. Mildly enlarged intrahepatic arteries and inferior vena cava, question right-sided heart disease. - Multiple small gallstones no ultrasound evidence for acute cholecystitis. Mildly thickened gallbladder wall.  10/03/23 CMP normal  SPEP M-spike 1.7, gamma-migrating paraprotein identified.  SPIEP IgG kappa band identified. IgA 34 IgM 28 IgG 2043 SFLC kappa 4.08 lambda 0.79 ratio 5.16 PT 14.0 INR 1.25 APTT 39.4 RADHA negative Granulocyte ab negative CCP ab negative Rheumatoid factor 11

## 2024-05-14 ENCOUNTER — APPOINTMENT (OUTPATIENT)
Dept: PLASTIC SURGERY | Facility: CLINIC | Age: 89
End: 2024-05-14
Payer: MEDICARE

## 2024-05-14 VITALS
HEIGHT: 60 IN | BODY MASS INDEX: 35.53 KG/M2 | WEIGHT: 181 LBS | HEART RATE: 66 BPM | SYSTOLIC BLOOD PRESSURE: 119 MMHG | TEMPERATURE: 97.6 F | DIASTOLIC BLOOD PRESSURE: 70 MMHG | OXYGEN SATURATION: 97 %

## 2024-05-14 DIAGNOSIS — C50.919 MALIGNANT NEOPLASM OF UNSPECIFIED SITE OF UNSPECIFIED FEMALE BREAST: ICD-10-CM

## 2024-05-14 PROCEDURE — 99213 OFFICE O/P EST LOW 20 MIN: CPT

## 2024-05-14 NOTE — HISTORY OF PRESENT ILLNESS
[FreeTextEntry1] : 88 year old female here for a follow up office visit.  She has a history of Stage IA right breast cancer  10/13/2008 S/P right lumpectomy, axillary sentinel lymph node biopsy 10/27/2008 S/P right re-excision  Completed XRT Med ONC: Dr. Mackey, complete 5 years of Arimidex.  Left ultrasound needle biopsy in 2011. Family history of breast cancer: daughter (age 46), mother (age 69 and 71), sister (age 50), maternal aunt and 3 maternal cousins. 10/5/2023 Bilateral mammogram: Post lumpectomy changes are seen in the right breast, central slightly lateral in location, inclusive of focal fat necrosis measuring 1.5 cm with associated evolving coarse type benign calcification. This could possibly explain referring physician's area of palpable concern at the 9:00 location. No suspicious mammographic findings are seen to explain patient's reported palpable concern. Subsequent ultrasound at this location was performed. Post radiation changes also seen. No suspicious mammographic findings are identified in the left breast to explain referring physician's area of palpable concern, specifically 3:00 location. Subsequent ultrasound at this location was performed. Additional scattered benign type calcifications are seen throughout the bilateral breast, majority of vascular etiology. BI-RADS 2. Bilateral ultrasound: Right: 11:00 (N1cm) focal area of fat necrosis correlating with mammographic appearance. Left: 2:00 (N4cm) redemonstrated circumscribed parallel hypoechoic mass with associated clip measuring 6 x 3 x 7 mm demonstrating long-term stability. BI-RADS 2. Heme: Sees Dr. Jeffrey Harris for MGUS. He felt bilateral palpable masses in October with inverted right nipple and sent her for imaging that was benign. On her recent follow up with him 4/19/2024, he feels the palpable right mass has increased in size. She is here with her daughter. She feels the right breat is the same as it always has been.

## 2024-05-14 NOTE — ASSESSMENT
[FreeTextEntry1] : H/O right breast cancer (2008) No evidence of disease recurrence on CBE   1. She defers annual mammograms 2. Follow up visit due in 1 year 3. Advise monthly self breast examinations and to contact me with any concerns.

## 2024-05-14 NOTE — PHYSICAL EXAM
[Normocephalic] : normocephalic [Atraumatic] : atraumatic [EOMI] : extra ocular movement intact [Sclera nonicteric] : sclera nonicteric [Supple] : supple [No Supraclavicular Adenopathy] : no supraclavicular adenopathy [Examined in the supine and seated position] : examined in the supine and seated position [Asymmetrical] : asymmetrical [No dominant masses] : no dominant masses left breast [No Nipple Retraction] : no left nipple retraction [No Nipple Discharge] : no left nipple discharge [No Axillary Lymphadenopathy] : no left axillary lymphadenopathy [No Edema] : no edema [No Rashes] : no rashes [No Ulceration] : no ulceration [de-identified] : Her left breast is larger than her right.  [de-identified] : Previous palpable scar tissue/FG tissue upper outer quadrant 3.9 x 5.0 cm, decreased with associated post-op nipple retraction. Well-healed upper and UO/axillary tail incisions.  [de-identified] : AOC 4:00 (N4.5cm) when breast is lifted palpable area measuring 1 x 0.8 cm c/w FG tissue.

## 2024-05-14 NOTE — REVIEW OF SYSTEMS
[Negative] : Endocrine [FreeTextEntry5] : Atrial Fibrillation [FreeTextEntry6] : Climbing stairs, walking [FreeTextEntry7] : duodenal  [FreeTextEntry8] : incontinence [FreeTextEntry9] : arthritis [de-identified] : on eliquis  for A fib

## 2024-05-14 NOTE — CONSULT LETTER
[Dear  ___] : Dear  [unfilled], [Courtesy Letter:] : I had the pleasure of seeing your patient, [unfilled], in my office today. [Please see my note below.] : Please see my note below. [Sincerely,] : Sincerely, [DrKathy  ___] : Dr. LEMON [FreeTextEntry3] : Louise Franco, RPA-C Breast Surgery [address] [place] [address], [place] [address] (Phone) [phone] (Fax) [phone]

## 2024-09-22 ENCOUNTER — OUTPATIENT (OUTPATIENT)
Dept: OUTPATIENT SERVICES | Facility: HOSPITAL | Age: 88
LOS: 1 days | Discharge: HOME | End: 2024-09-22

## 2024-09-22 DIAGNOSIS — C50.919 MALIGNANT NEOPLASM OF UNSPECIFIED SITE OF UNSPECIFIED FEMALE BREAST: ICD-10-CM

## 2024-09-22 DIAGNOSIS — Z96.651 PRESENCE OF RIGHT ARTIFICIAL KNEE JOINT: Chronic | ICD-10-CM

## 2024-09-22 DIAGNOSIS — Z98.51 TUBAL LIGATION STATUS: Chronic | ICD-10-CM

## 2024-09-22 DIAGNOSIS — Z98.890 OTHER SPECIFIED POSTPROCEDURAL STATES: Chronic | ICD-10-CM

## 2024-09-22 DIAGNOSIS — Z90.89 ACQUIRED ABSENCE OF OTHER ORGANS: Chronic | ICD-10-CM

## 2024-09-22 DIAGNOSIS — T14.8XXA OTHER INJURY OF UNSPECIFIED BODY REGION, INITIAL ENCOUNTER: Chronic | ICD-10-CM

## 2024-10-01 ENCOUNTER — APPOINTMENT (OUTPATIENT)
Dept: HEMATOLOGY ONCOLOGY | Facility: CLINIC | Age: 89
End: 2024-10-01

## 2024-11-15 ENCOUNTER — APPOINTMENT (OUTPATIENT)
Dept: HEMATOLOGY ONCOLOGY | Facility: CLINIC | Age: 89
End: 2024-11-15

## 2025-05-06 ENCOUNTER — OUTPATIENT (OUTPATIENT)
Dept: OUTPATIENT SERVICES | Facility: HOSPITAL | Age: 89
LOS: 1 days | Discharge: HOME | End: 2025-05-06

## 2025-05-06 DIAGNOSIS — T14.8XXA OTHER INJURY OF UNSPECIFIED BODY REGION, INITIAL ENCOUNTER: Chronic | ICD-10-CM

## 2025-05-06 DIAGNOSIS — Z96.651 PRESENCE OF RIGHT ARTIFICIAL KNEE JOINT: Chronic | ICD-10-CM

## 2025-05-06 DIAGNOSIS — Z98.51 TUBAL LIGATION STATUS: Chronic | ICD-10-CM

## 2025-05-06 DIAGNOSIS — Z98.890 OTHER SPECIFIED POSTPROCEDURAL STATES: Chronic | ICD-10-CM

## 2025-05-06 DIAGNOSIS — Z90.89 ACQUIRED ABSENCE OF OTHER ORGANS: Chronic | ICD-10-CM

## 2025-05-06 DIAGNOSIS — C50.919 MALIGNANT NEOPLASM OF UNSPECIFIED SITE OF UNSPECIFIED FEMALE BREAST: ICD-10-CM

## 2025-05-07 ENCOUNTER — RESULT REVIEW (OUTPATIENT)
Age: 89
End: 2025-05-07

## 2025-05-07 ENCOUNTER — LABORATORY RESULT (OUTPATIENT)
Age: 89
End: 2025-05-07

## 2025-05-07 ENCOUNTER — APPOINTMENT (OUTPATIENT)
Dept: HEMATOLOGY ONCOLOGY | Facility: CLINIC | Age: 89
End: 2025-05-07
Payer: MEDICARE

## 2025-05-07 ENCOUNTER — NON-APPOINTMENT (OUTPATIENT)
Age: 89
End: 2025-05-07

## 2025-05-07 VITALS
OXYGEN SATURATION: 96 % | WEIGHT: 166.45 LBS | DIASTOLIC BLOOD PRESSURE: 75 MMHG | HEART RATE: 60 BPM | RESPIRATION RATE: 16 BRPM | TEMPERATURE: 97.7 F | BODY MASS INDEX: 32.51 KG/M2 | SYSTOLIC BLOOD PRESSURE: 119 MMHG

## 2025-05-07 DIAGNOSIS — C44.712 BASAL CELL CARCINOMA OF SKIN OF RIGHT LOWER LIMB, INCLUDING HIP: ICD-10-CM

## 2025-05-07 DIAGNOSIS — Z79.01 LONG TERM (CURRENT) USE OF ANTICOAGULANTS: ICD-10-CM

## 2025-05-07 DIAGNOSIS — D75.9 DISEASE OF BLOOD AND BLOOD-FORMING ORGANS, UNSPECIFIED: ICD-10-CM

## 2025-05-07 DIAGNOSIS — I48.91 UNSPECIFIED ATRIAL FIBRILLATION: ICD-10-CM

## 2025-05-07 DIAGNOSIS — D47.2 MONOCLONAL GAMMOPATHY: ICD-10-CM

## 2025-05-07 DIAGNOSIS — C50.919 MALIGNANT NEOPLASM OF UNSPECIFIED SITE OF UNSPECIFIED FEMALE BREAST: ICD-10-CM

## 2025-05-07 DIAGNOSIS — D72.819 DECREASED WHITE BLOOD CELL COUNT, UNSPECIFIED: ICD-10-CM

## 2025-05-07 LAB
ALBUMIN SERPL ELPH-MCNC: 4.4 G/DL
ALP BLD-CCNC: 100 U/L
ALT SERPL-CCNC: 31 U/L
ANION GAP SERPL CALC-SCNC: 9 MMOL/L
AST SERPL-CCNC: 42 U/L
BILIRUB SERPL-MCNC: 0.6 MG/DL
BUN SERPL-MCNC: 24 MG/DL
CALCIUM SERPL-MCNC: 10.3 MG/DL
CHLORIDE SERPL-SCNC: 106 MMOL/L
CO2 SERPL-SCNC: 24 MMOL/L
CREAT SERPL-MCNC: 0.74 MG/DL
EGFRCR SERPLBLD CKD-EPI 2021: 77 ML/MIN/1.73M2
FOLATE SERPL-MCNC: >20 NG/ML
GLUCOSE SERPL-MCNC: 86 MG/DL
LDH SERPL-CCNC: 213 U/L
POTASSIUM SERPL-SCNC: 5.5 MMOL/L
PROT SERPL-MCNC: 8 G/DL
RETICS #: 36.2 K/UL — SIGNIFICANT CHANGE UP (ref 25–125)
RETICS/RBC NFR: 1.1 % — SIGNIFICANT CHANGE UP (ref 0.5–2.5)
SODIUM SERPL-SCNC: 139 MMOL/L
VIT B12 SERPL-MCNC: >2000 PG/ML

## 2025-05-07 PROCEDURE — G2212 PROLONG OUTPT/OFFICE VIS: CPT

## 2025-05-07 PROCEDURE — 99215 OFFICE O/P EST HI 40 MIN: CPT

## 2025-05-07 RX ORDER — MECOBAL/LEVOMEFOLAT CA/B6 PHOS 2-3-35 MG
3-35-2 TABLET ORAL
Refills: 0 | Status: ACTIVE | COMMUNITY
Start: 2025-05-07

## 2025-05-08 LAB
DEPRECATED KAPPA LC FREE/LAMBDA SER: 6.8 RATIO
IGA SER QL IEP: 28 MG/DL
IGG SER QL IEP: 2266 MG/DL
IGM SER QL IEP: 21 MG/DL
KAPPA LC CSF-MCNC: 0.87 MG/DL
KAPPA LC SERPL-MCNC: 5.92 MG/DL

## 2025-05-10 LAB
ALBUMIN MFR SERPL ELPH: 52.6 %
ALBUMIN SERPL-MCNC: 4.1 G/DL
ALBUMIN/GLOB SERPL: 1.1 RATIO
ALPHA1 GLOB MFR SERPL ELPH: 4 %
ALPHA1 GLOB SERPL ELPH-MCNC: 0.3 G/DL
ALPHA2 GLOB MFR SERPL ELPH: 7.7 %
ALPHA2 GLOB SERPL ELPH-MCNC: 0.6 G/DL
B-GLOBULIN MFR SERPL ELPH: 8.3 %
B-GLOBULIN SERPL ELPH-MCNC: 0.6 G/DL
GAMMA GLOB FLD ELPH-MCNC: 2.1 G/DL
GAMMA GLOB MFR SERPL ELPH: 27.4 %
INTERPRETATION SERPL IEP-IMP: NORMAL
M PROTEIN MFR SERPL ELPH: 25.6 %
MONOCLON BAND OBS SERPL: 2 G/DL
PROT SERPL-MCNC: 7.8 G/DL
PROT SERPL-MCNC: 7.8 G/DL

## 2025-05-12 LAB
ABR COMMENT: NORMAL
ALBUPE: 34.9 %
ALPHA1UPE: 21.9 %
ALPHA2UPE: 10 %
BETAUPE: 16.7 %
GAMMAUPE: 16.5 %
IGA 24H UR QL IFE: NORMAL
KAPPA LC 24H UR QL: NORMAL
PNH GRANULOCYTES: 0 %
PNH MONOCYTES: 0 %
PNH RBC - COMPLETE: 0 %
PNH RBC - PARTIAL: 0.01 %
PROT PATTERN 24H UR ELPH-IMP: NORMAL
PROT UR-MCNC: 71 MG/DL
PROT UR-MCNC: 71 MG/DL

## 2025-06-17 ENCOUNTER — APPOINTMENT (OUTPATIENT)
Dept: HEMATOLOGY ONCOLOGY | Facility: CLINIC | Age: 89
End: 2025-06-17
Payer: MEDICARE

## 2025-06-17 ENCOUNTER — RESULT REVIEW (OUTPATIENT)
Age: 89
End: 2025-06-17

## 2025-06-17 VITALS
WEIGHT: 171.74 LBS | OXYGEN SATURATION: 97 % | SYSTOLIC BLOOD PRESSURE: 126 MMHG | BODY MASS INDEX: 33.54 KG/M2 | HEART RATE: 73 BPM | DIASTOLIC BLOOD PRESSURE: 76 MMHG | RESPIRATION RATE: 14 BRPM | TEMPERATURE: 97.2 F

## 2025-06-17 VITALS
TEMPERATURE: 97.7 F | OXYGEN SATURATION: 97 % | HEART RATE: 73 BPM | DIASTOLIC BLOOD PRESSURE: 76 MMHG | SYSTOLIC BLOOD PRESSURE: 126 MMHG | RESPIRATION RATE: 14 BRPM

## 2025-06-17 LAB
ACANTHOCYTES BLD QL SMEAR: SLIGHT — SIGNIFICANT CHANGE UP
BASOPHILS # BLD AUTO: 0.01 K/UL — SIGNIFICANT CHANGE UP (ref 0–0.2)
BASOPHILS NFR BLD AUTO: 0.4 % — SIGNIFICANT CHANGE UP (ref 0–2)
BURR CELLS BLD QL SMEAR: PRESENT — SIGNIFICANT CHANGE UP
DACRYOCYTES BLD QL SMEAR: SLIGHT — SIGNIFICANT CHANGE UP
ELLIPTOCYTES BLD QL SMEAR: SLIGHT — SIGNIFICANT CHANGE UP
EOSINOPHIL # BLD AUTO: 0.07 K/UL — SIGNIFICANT CHANGE UP (ref 0–0.5)
EOSINOPHIL NFR BLD AUTO: 2.8 % — SIGNIFICANT CHANGE UP (ref 0–6)
HCT VFR BLD CALC: 35 % — SIGNIFICANT CHANGE UP (ref 34.5–45)
HGB BLD-MCNC: 10.7 G/DL — LOW (ref 11.5–15.5)
IMM GRANULOCYTES NFR BLD AUTO: 0.4 % — SIGNIFICANT CHANGE UP (ref 0–0.9)
LYMPHOCYTES # BLD AUTO: 1.28 K/UL — SIGNIFICANT CHANGE UP (ref 1–3.3)
LYMPHOCYTES # BLD AUTO: 51.6 % — HIGH (ref 13–44)
MACROCYTES BLD QL: SLIGHT — SIGNIFICANT CHANGE UP
MCHC RBC-ENTMCNC: 30.6 G/DL — LOW (ref 32–36)
MCHC RBC-ENTMCNC: 32.9 PG — SIGNIFICANT CHANGE UP (ref 27–34)
MCV RBC AUTO: 107.7 FL — HIGH (ref 80–100)
MONOCYTES # BLD AUTO: 0.24 K/UL — SIGNIFICANT CHANGE UP (ref 0–0.9)
MONOCYTES NFR BLD AUTO: 9.7 % — SIGNIFICANT CHANGE UP (ref 2–14)
NEUTROPHILS # BLD AUTO: 0.87 K/UL — LOW (ref 1.8–7.4)
NEUTROPHILS NFR BLD AUTO: 35.1 % — LOW (ref 43–77)
NRBC BLD AUTO-RTO: 0 /100 WBCS — SIGNIFICANT CHANGE UP (ref 0–0)
PLAT MORPH BLD: NORMAL — SIGNIFICANT CHANGE UP
PLATELET # BLD AUTO: 132 K/UL — LOW (ref 150–400)
POIKILOCYTOSIS BLD QL AUTO: SLIGHT — SIGNIFICANT CHANGE UP
RBC # BLD: 3.25 M/UL — LOW (ref 3.8–5.2)
RBC # FLD: 15.4 % — HIGH (ref 10.3–14.5)
RBC BLD AUTO: ABNORMAL
SCHISTOCYTES BLD QL AUTO: SLIGHT — SIGNIFICANT CHANGE UP
WBC # BLD: 2.48 K/UL — LOW (ref 3.8–10.5)
WBC # FLD AUTO: 2.48 K/UL — LOW (ref 3.8–10.5)

## 2025-06-17 PROCEDURE — 99215 OFFICE O/P EST HI 40 MIN: CPT

## 2025-06-17 RX ORDER — CHOLECALCIFEROL (VITAMIN D3) 1250 MCG
1.25 MG CAPSULE ORAL
Refills: 0 | Status: ACTIVE | COMMUNITY
Start: 2025-06-17

## 2025-06-18 LAB
ALBUMIN SERPL ELPH-MCNC: 4.3 G/DL
ALP BLD-CCNC: 101 U/L
ALT SERPL-CCNC: 18 U/L
ANION GAP SERPL CALC-SCNC: 13 MMOL/L
AST SERPL-CCNC: 29 U/L
BILIRUB SERPL-MCNC: 0.6 MG/DL
BUN SERPL-MCNC: 27 MG/DL
CALCIUM SERPL-MCNC: 9.9 MG/DL
CHLORIDE SERPL-SCNC: 106 MMOL/L
CO2 SERPL-SCNC: 22 MMOL/L
CREAT SERPL-MCNC: 0.72 MG/DL
EGFRCR SERPLBLD CKD-EPI 2021: 80 ML/MIN/1.73M2
GLUCOSE SERPL-MCNC: 94 MG/DL
IRON SATN MFR SERPL: 8 %
IRON SERPL-MCNC: 34 UG/DL
LDH SERPL-CCNC: 174 U/L
POTASSIUM SERPL-SCNC: 5.1 MMOL/L
PROT SERPL-MCNC: 7.9 G/DL
SODIUM SERPL-SCNC: 140 MMOL/L
TIBC SERPL-MCNC: 424 UG/DL
UIBC SERPL-MCNC: 389 UG/DL

## 2025-06-19 LAB — FERRITIN SERPL-MCNC: 26 NG/ML

## 2025-06-24 ENCOUNTER — APPOINTMENT (OUTPATIENT)
Dept: HEMATOLOGY ONCOLOGY | Facility: CLINIC | Age: 89
End: 2025-06-24

## 2025-06-29 ENCOUNTER — OUTPATIENT (OUTPATIENT)
Dept: OUTPATIENT SERVICES | Facility: HOSPITAL | Age: 89
LOS: 1 days | End: 2025-06-29
Payer: MEDICARE

## 2025-06-29 DIAGNOSIS — Z98.890 OTHER SPECIFIED POSTPROCEDURAL STATES: Chronic | ICD-10-CM

## 2025-06-29 DIAGNOSIS — Z90.89 ACQUIRED ABSENCE OF OTHER ORGANS: Chronic | ICD-10-CM

## 2025-06-29 DIAGNOSIS — Z96.651 PRESENCE OF RIGHT ARTIFICIAL KNEE JOINT: Chronic | ICD-10-CM

## 2025-06-29 DIAGNOSIS — Z98.51 TUBAL LIGATION STATUS: Chronic | ICD-10-CM

## 2025-06-29 DIAGNOSIS — D47.2 MONOCLONAL GAMMOPATHY: ICD-10-CM

## 2025-06-29 DIAGNOSIS — T14.8XXA OTHER INJURY OF UNSPECIFIED BODY REGION, INITIAL ENCOUNTER: Chronic | ICD-10-CM

## 2025-06-29 PROCEDURE — 76700 US EXAM ABDOM COMPLETE: CPT

## 2025-07-02 ENCOUNTER — APPOINTMENT (OUTPATIENT)
Dept: HEMATOLOGY ONCOLOGY | Facility: CLINIC | Age: 89
End: 2025-07-02
Payer: MEDICARE

## 2025-07-02 ENCOUNTER — RESULT REVIEW (OUTPATIENT)
Age: 89
End: 2025-07-02

## 2025-07-02 ENCOUNTER — NON-APPOINTMENT (OUTPATIENT)
Age: 89
End: 2025-07-02

## 2025-07-02 ENCOUNTER — LABORATORY RESULT (OUTPATIENT)
Age: 89
End: 2025-07-02

## 2025-07-02 VITALS
TEMPERATURE: 97 F | DIASTOLIC BLOOD PRESSURE: 78 MMHG | HEART RATE: 78 BPM | WEIGHT: 171.96 LBS | OXYGEN SATURATION: 99 % | BODY MASS INDEX: 33.58 KG/M2 | RESPIRATION RATE: 16 BRPM | SYSTOLIC BLOOD PRESSURE: 115 MMHG

## 2025-07-02 PROCEDURE — 38222 DX BONE MARROW BX & ASPIR: CPT

## 2025-07-17 ENCOUNTER — APPOINTMENT (OUTPATIENT)
Dept: VASCULAR SURGERY | Facility: CLINIC | Age: 89
End: 2025-07-17
Payer: MEDICARE

## 2025-07-17 VITALS
WEIGHT: 171 LBS | SYSTOLIC BLOOD PRESSURE: 117 MMHG | HEART RATE: 96 BPM | HEIGHT: 60 IN | DIASTOLIC BLOOD PRESSURE: 79 MMHG | TEMPERATURE: 98.6 F | BODY MASS INDEX: 33.57 KG/M2

## 2025-07-17 PROCEDURE — 99204 OFFICE O/P NEW MOD 45 MIN: CPT

## 2025-07-24 ENCOUNTER — NON-APPOINTMENT (OUTPATIENT)
Age: 89
End: 2025-07-24

## 2025-07-24 ENCOUNTER — APPOINTMENT (OUTPATIENT)
Dept: VASCULAR SURGERY | Facility: CLINIC | Age: 89
End: 2025-07-24
Payer: MEDICARE

## 2025-07-24 VITALS
BODY MASS INDEX: 33.38 KG/M2 | TEMPERATURE: 98 F | DIASTOLIC BLOOD PRESSURE: 80 MMHG | WEIGHT: 170 LBS | HEART RATE: 66 BPM | SYSTOLIC BLOOD PRESSURE: 131 MMHG | HEIGHT: 60 IN

## 2025-07-24 DIAGNOSIS — I83.893 VARICOSE VEINS OF BILATERAL LOWER EXTREMITIES WITH OTHER COMPLICATIONS: ICD-10-CM

## 2025-07-24 PROCEDURE — 99214 OFFICE O/P EST MOD 30 MIN: CPT

## 2025-07-24 PROCEDURE — 93970 EXTREMITY STUDY: CPT

## 2025-07-28 ENCOUNTER — OUTPATIENT (OUTPATIENT)
Dept: OUTPATIENT SERVICES | Facility: HOSPITAL | Age: 89
LOS: 1 days | Discharge: HOME | End: 2025-07-28

## 2025-07-28 DIAGNOSIS — Z98.890 OTHER SPECIFIED POSTPROCEDURAL STATES: Chronic | ICD-10-CM

## 2025-07-28 DIAGNOSIS — C50.919 MALIGNANT NEOPLASM OF UNSPECIFIED SITE OF UNSPECIFIED FEMALE BREAST: ICD-10-CM

## 2025-07-29 ENCOUNTER — APPOINTMENT (OUTPATIENT)
Dept: VASCULAR SURGERY | Facility: CLINIC | Age: 89
End: 2025-07-29

## 2025-08-01 ENCOUNTER — RESULT REVIEW (OUTPATIENT)
Age: 89
End: 2025-08-01

## 2025-08-01 ENCOUNTER — APPOINTMENT (OUTPATIENT)
Dept: HEMATOLOGY ONCOLOGY | Facility: CLINIC | Age: 89
End: 2025-08-01
Payer: MEDICARE

## 2025-08-01 ENCOUNTER — LABORATORY RESULT (OUTPATIENT)
Age: 89
End: 2025-08-01

## 2025-08-01 VITALS
HEART RATE: 81 BPM | SYSTOLIC BLOOD PRESSURE: 125 MMHG | DIASTOLIC BLOOD PRESSURE: 85 MMHG | OXYGEN SATURATION: 97 % | RESPIRATION RATE: 16 BRPM | BODY MASS INDEX: 32.85 KG/M2 | TEMPERATURE: 97 F | WEIGHT: 168.21 LBS

## 2025-08-01 DIAGNOSIS — D47.2 MONOCLONAL GAMMOPATHY: ICD-10-CM

## 2025-08-01 DIAGNOSIS — D75.9 DISEASE OF BLOOD AND BLOOD-FORMING ORGANS, UNSPECIFIED: ICD-10-CM

## 2025-08-01 DIAGNOSIS — I48.91 UNSPECIFIED ATRIAL FIBRILLATION: ICD-10-CM

## 2025-08-01 DIAGNOSIS — Z79.01 LONG TERM (CURRENT) USE OF ANTICOAGULANTS: ICD-10-CM

## 2025-08-01 DIAGNOSIS — D72.819 DECREASED WHITE BLOOD CELL COUNT, UNSPECIFIED: ICD-10-CM

## 2025-08-01 PROCEDURE — 99215 OFFICE O/P EST HI 40 MIN: CPT

## 2025-08-01 RX ORDER — MECOBAL/LEVOMEFOLAT CA/B6 PHOS 2-3-35 MG
3-35-2 TABLET ORAL
Refills: 0 | Status: ACTIVE | COMMUNITY
Start: 2025-08-01

## 2025-08-04 LAB
ALBUMIN SERPL ELPH-MCNC: 4.2 G/DL
ALP BLD-CCNC: 90 U/L
ALT SERPL-CCNC: 21 U/L
ANION GAP SERPL CALC-SCNC: 11 MMOL/L
AST SERPL-CCNC: 33 U/L
BILIRUB SERPL-MCNC: 0.8 MG/DL
BUN SERPL-MCNC: 27 MG/DL
CALCIUM SERPL-MCNC: 9.7 MG/DL
CHLORIDE SERPL-SCNC: 103 MMOL/L
CO2 SERPL-SCNC: 24 MMOL/L
CREAT SERPL-MCNC: 0.78 MG/DL
DEPRECATED KAPPA LC FREE/LAMBDA SER: 6.88 RATIO
EGFRCR SERPLBLD CKD-EPI 2021: 73 ML/MIN/1.73M2
FERRITIN SERPL-MCNC: 25 NG/ML
GLUCOSE SERPL-MCNC: 87 MG/DL
IGA SERPL-MCNC: 27 MG/DL
IGG SERPL-MCNC: 2353 MG/DL
IGM SERPL-MCNC: 21 MG/DL
IRON SATN MFR SERPL: 13 %
IRON SERPL-MCNC: 56 UG/DL
KAPPA LC CSF-MCNC: 0.72 MG/DL
KAPPA LC SERPL-MCNC: 4.95 MG/DL
LDH SERPL-CCNC: 188 U/L
POTASSIUM SERPL-SCNC: 4.5 MMOL/L
PROT SERPL-MCNC: 7.6 G/DL
SODIUM SERPL-SCNC: 138 MMOL/L
TIBC SERPL-MCNC: 418 UG/DL
UIBC SERPL-MCNC: 362 UG/DL

## 2025-08-06 LAB
ALBUMIN MFR SERPL ELPH: 53.1 %
ALBUMIN SERPL-MCNC: 4 G/DL
ALBUMIN/GLOB SERPL: 1.1 RATIO
ALPHA1 GLOB MFR SERPL ELPH: 3.7 %
ALPHA1 GLOB SERPL ELPH-MCNC: 0.3 G/DL
ALPHA2 GLOB MFR SERPL ELPH: 7.3 %
ALPHA2 GLOB SERPL ELPH-MCNC: 0.6 G/DL
B-GLOBULIN MFR SERPL ELPH: 8 %
B-GLOBULIN SERPL ELPH-MCNC: 0.6 G/DL
GAMMA GLOB FLD ELPH-MCNC: 2.1 G/DL
GAMMA GLOB MFR SERPL ELPH: 27.9 %
INTERPRETATION SERPL IEP-IMP: NORMAL
M PROTEIN MFR SERPL ELPH: 26.3 %
MONOCLON BAND OBS SERPL: 2 G/DL
PROT SERPL-MCNC: 7.6 G/DL
PROT SERPL-MCNC: 7.6 G/DL

## 2025-08-28 ENCOUNTER — APPOINTMENT (OUTPATIENT)
Dept: VASCULAR SURGERY | Facility: CLINIC | Age: 89
End: 2025-08-28